# Patient Record
Sex: FEMALE | Race: WHITE
[De-identification: names, ages, dates, MRNs, and addresses within clinical notes are randomized per-mention and may not be internally consistent; named-entity substitution may affect disease eponyms.]

---

## 2016-05-03 VITALS — DIASTOLIC BLOOD PRESSURE: 86 MMHG | SYSTOLIC BLOOD PRESSURE: 136 MMHG

## 2017-03-28 ENCOUNTER — HOSPITAL ENCOUNTER (INPATIENT)
Dept: HOSPITAL 63 - ER | Age: 22
LOS: 5 days | Discharge: HOME | DRG: 872 | End: 2017-04-02
Attending: INTERNAL MEDICINE | Admitting: INTERNAL MEDICINE
Payer: OTHER GOVERNMENT

## 2017-03-28 VITALS — HEIGHT: 67 IN | WEIGHT: 110.38 LBS | BODY MASS INDEX: 17.33 KG/M2

## 2017-03-28 DIAGNOSIS — N39.0: ICD-10-CM

## 2017-03-28 DIAGNOSIS — D51.9: ICD-10-CM

## 2017-03-28 DIAGNOSIS — A41.9: Primary | ICD-10-CM

## 2017-03-28 DIAGNOSIS — E27.1: ICD-10-CM

## 2017-03-28 DIAGNOSIS — Z88.1: ICD-10-CM

## 2017-03-28 DIAGNOSIS — K21.9: ICD-10-CM

## 2017-03-28 DIAGNOSIS — N31.9: ICD-10-CM

## 2017-03-28 DIAGNOSIS — B96.89: ICD-10-CM

## 2017-03-28 DIAGNOSIS — J45.909: ICD-10-CM

## 2017-03-28 DIAGNOSIS — D84.9: ICD-10-CM

## 2017-03-28 DIAGNOSIS — B96.20: ICD-10-CM

## 2017-03-28 DIAGNOSIS — M54.2: ICD-10-CM

## 2017-03-28 DIAGNOSIS — E06.3: ICD-10-CM

## 2017-03-28 DIAGNOSIS — Z87.820: ICD-10-CM

## 2017-03-28 LAB
ALBUMIN SERPL-MCNC: 3.5 G/DL (ref 3.4–5)
ALBUMIN/GLOB SERPL: 0.8 {RATIO} (ref 1–1.7)
ALP SERPL-CCNC: 60 U/L (ref 46–116)
ALT SERPL-CCNC: 22 U/L (ref 14–59)
AMORPH SED URNS QL MICRO: PRESENT /HPF
AMYLASE SERPL-CCNC: 55 U/L (ref 25–115)
ANION GAP SERPL CALC-SCNC: 11 MMOL/L (ref 6–14)
APTT PPP: YELLOW S
AST SERPL-CCNC: 13 U/L (ref 15–37)
BACTERIA #/AREA URNS HPF: (no result) /HPF
BASOPHILS # BLD AUTO: 0 X10^3/UL (ref 0–0.2)
BASOPHILS NFR BLD: 0 % (ref 0–3)
BILIRUB SERPL-MCNC: 0.4 MG/DL (ref 0.2–1)
BILIRUB UR QL STRIP: (no result)
BUN/CREAT SERPL: 15 (ref 6–20)
CA-I SERPL ISE-MCNC: 9 MG/DL (ref 7–20)
CALCIUM SERPL-MCNC: 8.5 MG/DL (ref 8.5–10.1)
CHLORIDE SERPL-SCNC: 104 MMOL/L (ref 98–107)
CO2 SERPL-SCNC: 23 MMOL/L (ref 21–32)
CREAT SERPL-MCNC: 0.6 MG/DL (ref 0.6–1)
CRP SERPL-MCNC: 33.8 MG/L (ref 0–3.3)
EOSINOPHIL NFR BLD: 0 % (ref 0–3)
EOSINOPHIL NFR BLD: 0 X10^3/UL (ref 0–0.7)
ERYTHROCYTE [DISTWIDTH] IN BLOOD BY AUTOMATED COUNT: 12.6 % (ref 11.5–14.5)
ERYTHROCYTE [SEDIMENTATION RATE] IN BLOOD: 4 MM/H (ref 0–25)
FIBRINOGEN PPP-MCNC: (no result) MG/DL
GFR SERPLBLD BASED ON 1.73 SQ M-ARVRAT: 126.2 ML/MIN
GLOBULIN SER-MCNC: 4.6 G/DL (ref 2.2–3.8)
GLUCOSE SERPL-MCNC: 87 MG/DL (ref 70–99)
GLUCOSE UR STRIP-MCNC: (no result) MG/DL
HCT VFR BLD CALC: 37.9 % (ref 36–47)
HGB BLD-MCNC: 12.6 G/DL (ref 12–15.5)
INFLUENZA A PATIENT: NEGATIVE
INFLUENZA B PATIENT: NEGATIVE
LIPASE: 102 U/L (ref 73–393)
LYMPHOCYTES # BLD: 0.5 X10^3/UL (ref 1–4.8)
LYMPHOCYTES NFR BLD AUTO: 9 % (ref 24–48)
MCH RBC QN AUTO: 30 PG (ref 25–35)
MCHC RBC AUTO-ENTMCNC: 33 G/DL (ref 31–37)
MCV RBC AUTO: 91 FL (ref 79–100)
MONO #: 0.3 X10^3/UL (ref 0–1.1)
MONOCYTES NFR BLD: 6 % (ref 0–9)
NEUT #: 4.9 X10^3UL (ref 1.8–7.7)
NEUTROPHILS NFR BLD AUTO: 86 % (ref 31–73)
NITRITE UR QL STRIP: (no result)
PLATELET # BLD AUTO: 153 X10^3/UL (ref 140–400)
POTASSIUM SERPL-SCNC: 3.6 MMOL/L (ref 3.5–5.1)
PROT SERPL-MCNC: 8.1 G/DL (ref 6.4–8.2)
RBC # BLD AUTO: 4.15 X10^6/UL (ref 3.5–5.4)
RBC #/AREA URNS HPF: (no result) /HPF (ref 0–2)
SODIUM SERPL-SCNC: 138 MMOL/L (ref 136–145)
SP GR UR STRIP: 1.02
SQUAMOUS #/AREA URNS LPF: (no result) /LPF
UROBILINOGEN UR-MCNC: 1 MG/DL
WBC # BLD AUTO: 5.7 X10^3/UL (ref 4–11)
WBC #/AREA URNS HPF: (no result) /HPF (ref 0–4)

## 2017-03-28 PROCEDURE — 87186 SC STD MICRODIL/AGAR DIL: CPT

## 2017-03-28 PROCEDURE — 82150 ASSAY OF AMYLASE: CPT

## 2017-03-28 PROCEDURE — 86140 C-REACTIVE PROTEIN: CPT

## 2017-03-28 PROCEDURE — 87086 URINE CULTURE/COLONY COUNT: CPT

## 2017-03-28 PROCEDURE — 81001 URINALYSIS AUTO W/SCOPE: CPT

## 2017-03-28 PROCEDURE — 87205 SMEAR GRAM STAIN: CPT

## 2017-03-28 PROCEDURE — 96374 THER/PROPH/DIAG INJ IV PUSH: CPT

## 2017-03-28 PROCEDURE — 87040 BLOOD CULTURE FOR BACTERIA: CPT

## 2017-03-28 PROCEDURE — 82010 KETONE BODYS QUAN: CPT

## 2017-03-28 PROCEDURE — 96375 TX/PRO/DX INJ NEW DRUG ADDON: CPT

## 2017-03-28 PROCEDURE — 85651 RBC SED RATE NONAUTOMATED: CPT

## 2017-03-28 PROCEDURE — 36415 COLL VENOUS BLD VENIPUNCTURE: CPT

## 2017-03-28 PROCEDURE — 87804 INFLUENZA ASSAY W/OPTIC: CPT

## 2017-03-28 PROCEDURE — 87880 STREP A ASSAY W/OPTIC: CPT

## 2017-03-28 PROCEDURE — 87070 CULTURE OTHR SPECIMN AEROBIC: CPT

## 2017-03-28 PROCEDURE — 80048 BASIC METABOLIC PNL TOTAL CA: CPT

## 2017-03-28 PROCEDURE — 85027 COMPLETE CBC AUTOMATED: CPT

## 2017-03-28 PROCEDURE — 80053 COMPREHEN METABOLIC PANEL: CPT

## 2017-03-28 PROCEDURE — 96361 HYDRATE IV INFUSION ADD-ON: CPT

## 2017-03-28 PROCEDURE — 71010: CPT

## 2017-03-28 PROCEDURE — 84443 ASSAY THYROID STIM HORMONE: CPT

## 2017-03-28 PROCEDURE — 83690 ASSAY OF LIPASE: CPT

## 2017-03-28 RX ADMIN — PROMETHAZINE HYDROCHLORIDE PRN MLS/HR: 25 INJECTION INTRAMUSCULAR; INTRAVENOUS at 22:00

## 2017-03-28 NOTE — PHYS DOC
General


Chief Complaint:  FEVER


Stated Complaint:  FEVER


Time Seen by MD:  18:40


Source:  patient, family


Problems:  





History of Present Illness


Initial Comments


Patient with mother for fever. Patient is no immunodeficiency actually receives 

outpatient immunoglobulin infusions at this facility, most recently yesterday. 

Last night she developed a fever as high as 101. Mother's been giving Advil and 

Tylenol for this at home which does bring the fever down, but it comes right 

back up. Patient's also mixed headache, neck pain, back pain, and nausea. Her 

headache is generalized in nature. She has no vision changes but does have some 

photophobia. There is no earache or runny nose. There is no sore throat. She 

complains of pain in the hand as well as the back of the neck and the back. 

There is no real chest pain or shortness of breath. She does have some nausea 

but no vomiting today. She has had decreased by mouth intake. She has some 

epigastric discomfort but no other abdominal pain. No change in bowel or 

bladder habits noted. There is no acute focal extremity or neurologic 

complaints. Patient as an extensive medical history and is quite complicated. 

Most of her care is performed by her mother, who also provides most the details 

of history today. Other than Advil or Tylenol, the patient did go the primary 

care physician today and was subsequently referred to the ER for further 

evaluation. There is no other increase or decreasing factors noted. Patient's 

past medical history is unremarkable for primary mean deficiency disorder. She 

receives frequent immunoglobulin infusions, last yesterday. She also has 

Hashimoto's thyroiditis, reflux, adrenal insufficiency, asthma, arrhythmias, 

and has a feeding tube in place. This user supplement feedings only of the 

patient is able tolerate by mouth food and fluids. She also is intermittently 

cathetered she has neurogenic bladder and she is on a bowel program according 

mother. Patient parents had some surgery medical brain injury in intensity, and 

has a diffuse neuropathy of undetermined cause as well. 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 The patient 

is wheelchair-bound and the vast majority of care is accomplished by the 

mother. She is a nonsmoker and nonuser of ethanol.


Allergies:  


Coded Allergies:  


     erythromycin base (Verified  Allergy, Intermediate, 5/4/16)


     I S O L A T I O N *CONTACT* (Verified  Allergy, Unknown, 5/5/16)


 +nasal MRSA 5-4-16





Past Medical History


Medical History:  other


Psychosocial History:  other





Family History


Significant Family History:  no pertinent family hx





Social History


Smoker:  non-smoker


Alcohol:  none





Review of Systems


All Other Systems:  Reviewed and Negative





Physical Exam


General Appearance:  WD/WN, no apparent distress


Eyes:  bilateral eye normal inspection


Ear, Nose, Throat:  normal ENT inspection, normal pharynx


Neck:  full range of motion, supple, normal inspection


Respiratory:  lungs clear, normal breath sounds, no respiratory distress


Cardiovascular:  regular rate, rhythm, no edema


Gastrointestinal:  non tender, soft, no organomegaly, other


Back:  no CVA tenderness, no vertebral tenderness


Extremities:  non-tender, normal inspection


Neurologic/Psychiatric:  alert, normal mood/affect, oriented x 3, other


Skin:  normal color


Lymphatic:  no adenopathy


Comments


Generally this is a pale, chronically ill-appearing white female in no acute 

somatic distress. Vitals are as noted. Pertinent findings on physical exam 

shows ears and throat to be clear. Pupils are equal and reactive light and 

accommodation. Extraocular movements are intact. The scalp is diffusely tender 

without signs of trauma. Neck is supple without gross adenopathy or JVD. There'

s no swollen thyroid palpated. The patient moves the head through the full 

range of motion with some complaints of pain in the back of the neck. However, 

Kernig's and Brudzinski's are negative. Chest is clear cardiac exam shows 

regular rate and rhythm without murmur. The abdomen is soft and nontender 

without mass or megaly. There is no perineal findings. A feeding tube in place 

in left upper quadrant. The site looks good. Back shows no CVA tenderness. 

Externally show no rashes, cyanosis, or edema. Neurologic exam finds patient be 

awake alert oriented but with a flat affect and appears depressed. Cranial 

nerves II through XII appear grossly intact. Patient holds left arm flaccid 

with 1 over 5 strength in her known brachial plexus injury. She has good 5 over 

5 strength in the right upper extremity. She has bilateral lower extremity 

weakness 2 over 5. There are no gross sensory deficits. Remainder of physical 

exam is clinically unremarkable.





Orders, Labs, Meds


Old charts note multiple outpatient visits for Privigen infusion. Her most 

recent infusion was actually done yesterday is now patient. She's been admitted 

the ER for syncope, headache, and adverse reaction to immunoglobulins. She was 

last admitted hospital in May of last year systemic inflammatory response 

syndrome with history of chronic immune deficiency and year retention as well 

as a history of DVT.. There is no specific infection identified. She was 

treated with broad-spectrum antibiotics and infectious disease was consult. It 

was noted that if she was improved, she can probably go home without further 

antibiotics.





Labs today show white count of 5.7 with 86 polys and 6 lymphs. Hemoglobin 12.6. 

Chem profile and acetone are negative. Urinalysis shows only equivocal evidence 

for UTI 5-10 white cells few bacteria positive nitrate and trace leukocyte 

esterase.  Blood cultures have been taken and sent to the lab.





Chest x-ray shows a port in place. There are no acute changes seen by the 

emergency physician.





2300  Patient resting comfortably in the ER. Did apologize to patient and 

family on multiple occasions for place and care. Some are related to patient 

volume, as well as to obtain catheterized urine sample. Patient and her mother 

are both very gracious in understanding indicate this and the busiest they've 

ever seen the ED. I reevaluated the patient twice during her ED stay. She says 

that her somatic pain is decreased, but that just seems to make her headache 

more prominent. She is continuing to complain of some neck pain. I examination, 

however, she continues to have no real meningeal signs. She seems to indicate 

when she moves her head or tenderness and pain is more over the right SCM muscle

, and there is some mild tenderness at that area. There is really no nuchal 

rigidity or specific signs of meningitis, and she overall appears to be 

somewhat more perky and interactive now than earlier. She did feel warm and I 

retook her temperature. Was 102.7 by physician evaluation. I discussed with the 

mother the and the patient the uncertain cause of her fever and generalized 

illness. It looks like in the past due to her immunodeficiency, she's been 

placed on broad-spectrum antibodies, admitted, and absurd with consult by 

infectious disease. I think given this scenario with a persistent fever and no 

source, that is a reasonable course of action at this time. Flu swabs have been 

obtained but results are not back at this time. I don't think the patient has 

meningeal signs this time, certainly should she have increasing headache or 

nuchal rigidity LP can be done. Mother is comfortable deferring lumbar puncture 

at this time as is the patient. Patient's primary care physician is not 

admitting tonight. I discussed the case with Dr. Be of the hospitalist 

service. The mother. He knows Dr. Timmons. He does not recall the patient. 

However, he graciously agrees to accept the patient for observation admission 

with broad-spectrum antibiotic coverage and infectious disease consultation. We'

ll treat the patient symptomatically for fever with Tylenol as well as for any 

headache or nausea she might have. I have started some meropenem per the 

Sol guide for broad-spectrum coverage the patient was immunosuppression. I 

ordered urine culture in addition to blood cultures. She is resting comfortably 

at this time, in the company of her mother and an absolutely delightful service 

dog, awaiting transfer to the floor and hospitalist care.








RODNEY COLLAZO MD Mar 28, 2017 19:14

## 2017-03-28 NOTE — PHYS DOC
General


Chief Complaint:  FEVER


Stated Complaint:  FEVER


Time Seen by MD:  18:40


Problems:  





History of Present Illness


Allergies:  


Coded Allergies:  


     erythromycin base (Verified  Allergy, Intermediate, 5/4/16)


     I S O L A T I O N *CONTACT* (Verified  Allergy, Unknown, 5/5/16)


 +nasal MRSA 5-4-16





Past Medical History


Medical History:  other


Surgical History:  noncontributory





Family History


Significant Family History:  no pertinent family hx





Orders, Labs, Meds


Old charts note multiple outpatient visits for Privigen infusion. Her most 

recent infusion was actually done yesterday is now patient. She's been admitted 

the ER for syncope, headache, and adverse reaction to immunoglobulins. She was 

last admitted hospital in May of last year systemic inflammatory response 

syndrome with history of chronic immune deficiency and year retention as well 

as a history of DVT.. There is no specific infection identified. She was 

treated with broad-spectrum antibiotics and infectious disease was consult. It 

was noted that if she was improved, she can probably go home without further 

antibiotics.








RODNEY COLLAZO MD Mar 28, 2017 20:01

## 2017-03-29 VITALS — SYSTOLIC BLOOD PRESSURE: 92 MMHG | DIASTOLIC BLOOD PRESSURE: 58 MMHG

## 2017-03-29 VITALS — DIASTOLIC BLOOD PRESSURE: 68 MMHG | SYSTOLIC BLOOD PRESSURE: 108 MMHG

## 2017-03-29 VITALS — DIASTOLIC BLOOD PRESSURE: 33 MMHG | SYSTOLIC BLOOD PRESSURE: 73 MMHG

## 2017-03-29 VITALS — DIASTOLIC BLOOD PRESSURE: 49 MMHG | SYSTOLIC BLOOD PRESSURE: 82 MMHG

## 2017-03-29 VITALS — SYSTOLIC BLOOD PRESSURE: 90 MMHG | DIASTOLIC BLOOD PRESSURE: 49 MMHG

## 2017-03-29 VITALS — DIASTOLIC BLOOD PRESSURE: 65 MMHG | SYSTOLIC BLOOD PRESSURE: 104 MMHG

## 2017-03-29 VITALS — DIASTOLIC BLOOD PRESSURE: 57 MMHG | SYSTOLIC BLOOD PRESSURE: 94 MMHG

## 2017-03-29 VITALS — DIASTOLIC BLOOD PRESSURE: 39 MMHG | SYSTOLIC BLOOD PRESSURE: 76 MMHG

## 2017-03-29 VITALS — DIASTOLIC BLOOD PRESSURE: 58 MMHG | SYSTOLIC BLOOD PRESSURE: 95 MMHG

## 2017-03-29 LAB
ALBUMIN SERPL-MCNC: 2.8 G/DL (ref 3.4–5)
ALBUMIN/GLOB SERPL: 0.7 {RATIO} (ref 1–1.7)
ALP SERPL-CCNC: 50 U/L (ref 46–116)
ALT SERPL-CCNC: 16 U/L (ref 14–59)
ANION GAP SERPL CALC-SCNC: 8 MMOL/L (ref 6–14)
AST SERPL-CCNC: 11 U/L (ref 15–37)
BASOPHILS # BLD AUTO: 0 X10^3/UL (ref 0–0.2)
BASOPHILS NFR BLD: 1 % (ref 0–3)
BILIRUB SERPL-MCNC: 0.3 MG/DL (ref 0.2–1)
BUN/CREAT SERPL: 12 (ref 6–20)
CA-I SERPL ISE-MCNC: 7 MG/DL (ref 7–20)
CALCIUM SERPL-MCNC: 7.6 MG/DL (ref 8.5–10.1)
CHLORIDE SERPL-SCNC: 107 MMOL/L (ref 98–107)
CO2 SERPL-SCNC: 25 MMOL/L (ref 21–32)
CREAT SERPL-MCNC: 0.6 MG/DL (ref 0.6–1)
EOSINOPHIL NFR BLD: 0 % (ref 0–3)
EOSINOPHIL NFR BLD: 0 X10^3/UL (ref 0–0.7)
ERYTHROCYTE [DISTWIDTH] IN BLOOD BY AUTOMATED COUNT: 12.9 % (ref 11.5–14.5)
GFR SERPLBLD BASED ON 1.73 SQ M-ARVRAT: 126.2 ML/MIN
GLOBULIN SER-MCNC: 3.8 G/DL (ref 2.2–3.8)
GLUCOSE SERPL-MCNC: 103 MG/DL (ref 70–99)
HCT VFR BLD CALC: 33 % (ref 36–47)
HGB BLD-MCNC: 11.1 G/DL (ref 12–15.5)
LYMPHOCYTES # BLD: 0.6 X10^3/UL (ref 1–4.8)
LYMPHOCYTES NFR BLD AUTO: 20 % (ref 24–48)
MCH RBC QN AUTO: 31 PG (ref 25–35)
MCHC RBC AUTO-ENTMCNC: 34 G/DL (ref 31–37)
MCV RBC AUTO: 92 FL (ref 79–100)
MONO #: 0.3 X10^3/UL (ref 0–1.1)
MONOCYTES NFR BLD: 8 % (ref 0–9)
NEUT #: 2.2 X10^3UL (ref 1.8–7.7)
NEUTROPHILS NFR BLD AUTO: 70 % (ref 31–73)
PLATELET # BLD AUTO: 126 X10^3/UL (ref 140–400)
POTASSIUM SERPL-SCNC: 3.1 MMOL/L (ref 3.5–5.1)
PROT SERPL-MCNC: 6.6 G/DL (ref 6.4–8.2)
RBC # BLD AUTO: 3.6 X10^6/UL (ref 3.5–5.4)
SODIUM SERPL-SCNC: 140 MMOL/L (ref 136–145)
WBC # BLD AUTO: 3.1 X10^3/UL (ref 4–11)

## 2017-03-29 RX ADMIN — METOPROLOL TARTRATE SCH MG: 25 TABLET, FILM COATED ORAL at 21:00

## 2017-03-29 RX ADMIN — PROMETHAZINE HYDROCHLORIDE PRN MLS/HR: 25 INJECTION INTRAMUSCULAR; INTRAVENOUS at 13:37

## 2017-03-29 RX ADMIN — ONDANSETRON PRN MG: 2 INJECTION, SOLUTION INTRAMUSCULAR; INTRAVENOUS at 01:32

## 2017-03-29 RX ADMIN — PROMETHAZINE HYDROCHLORIDE PRN MLS/HR: 25 INJECTION INTRAMUSCULAR; INTRAVENOUS at 23:58

## 2017-03-29 RX ADMIN — FENTANYL CITRATE PRN MCG: 50 INJECTION, SOLUTION INTRAMUSCULAR; INTRAVENOUS at 23:57

## 2017-03-29 RX ADMIN — DEXTROSE, SODIUM CHLORIDE, AND POTASSIUM CHLORIDE SCH MLS/HR: 5; .45; .3 INJECTION INTRAVENOUS at 15:05

## 2017-03-29 RX ADMIN — HYDROCORTISONE SODIUM SUCCINATE SCH MG: 100 INJECTION, POWDER, FOR SOLUTION INTRAMUSCULAR; INTRAVENOUS at 21:37

## 2017-03-29 RX ADMIN — ONDANSETRON PRN MG: 2 INJECTION, SOLUTION INTRAMUSCULAR; INTRAVENOUS at 09:47

## 2017-03-29 RX ADMIN — DEXTROSE AND SODIUM CHLORIDE SCH MLS/HR: 5; .9 INJECTION, SOLUTION INTRAVENOUS at 01:28

## 2017-03-29 RX ADMIN — MEROPENEM SCH MLS/HR: 1 INJECTION, POWDER, FOR SOLUTION INTRAVENOUS at 05:35

## 2017-03-29 RX ADMIN — HYDROCORTISONE SODIUM SUCCINATE SCH MG: 100 INJECTION, POWDER, FOR SOLUTION INTRAMUSCULAR; INTRAVENOUS at 15:05

## 2017-03-29 RX ADMIN — DEXTROSE, SODIUM CHLORIDE, AND POTASSIUM CHLORIDE SCH MLS/HR: 5; .45; .3 INJECTION INTRAVENOUS at 21:36

## 2017-03-29 RX ADMIN — FLUDROCORTISONE ACETATE SCH MG: 0.1 TABLET ORAL at 11:42

## 2017-03-29 RX ADMIN — METOPROLOL TARTRATE SCH MG: 25 TABLET, FILM COATED ORAL at 09:37

## 2017-03-29 RX ADMIN — MEROPENEM SCH MLS/HR: 1 INJECTION, POWDER, FOR SOLUTION INTRAVENOUS at 13:38

## 2017-03-29 RX ADMIN — FENTANYL CITRATE PRN MCG: 50 INJECTION, SOLUTION INTRAMUSCULAR; INTRAVENOUS at 06:03

## 2017-03-29 RX ADMIN — PANTOPRAZOLE SODIUM SCH MG: 40 TABLET, DELAYED RELEASE ORAL at 11:42

## 2017-03-29 RX ADMIN — FENTANYL CITRATE PRN MCG: 50 INJECTION, SOLUTION INTRAMUSCULAR; INTRAVENOUS at 01:31

## 2017-03-29 RX ADMIN — MEROPENEM SCH MLS/HR: 1 INJECTION, POWDER, FOR SOLUTION INTRAVENOUS at 21:37

## 2017-03-29 RX ADMIN — FENTANYL CITRATE PRN MCG: 50 INJECTION, SOLUTION INTRAMUSCULAR; INTRAVENOUS at 13:37

## 2017-03-29 RX ADMIN — FENTANYL CITRATE PRN MCG: 50 INJECTION, SOLUTION INTRAMUSCULAR; INTRAVENOUS at 09:45

## 2017-03-29 RX ADMIN — FENTANYL CITRATE PRN MCG: 50 INJECTION, SOLUTION INTRAMUSCULAR; INTRAVENOUS at 19:50

## 2017-03-29 RX ADMIN — DEXTROSE AND SODIUM CHLORIDE SCH MLS/HR: 5; .9 INJECTION, SOLUTION INTRAVENOUS at 09:36

## 2017-03-29 NOTE — PN
DATE:  03/29/2017



SUBJECTIVE:  The patient is resting slightly propped up in bed, in no apparent

respiratory distress.  She continued to complain of headache and neck pain. 

However, her temperature is down.  Temperature went up to 103 Fahrenheit this

morning.  However, by the time I saw her this afternoon, her temperature is down

at 98.4 and again at 99.3.



PHYSICAL EXAMINATION:

GENERAL:  When I examined her, she looked pale, but no jaundice, cyanosis, or

thyromegaly.  No jugular venous distension.  No limb edema.

VITAL SIGNS:  Her heart rate was 87, blood pressure was 92/58, temperature was

99.3, respiratory rate was 20, and oxygen saturation was 98%.

HEAD, EYES, EARS, NOSE, AND THROAT:  Normocephalic, atraumatic.

NECK:  Supple.

HEART:  Showed normal first and second heart sounds.  No gallop, rub, or murmur.

CHEST:  Clear to auscultation.  No crepitation or rhonchi.

ABDOMEN:  Distended, soft, nontender.  No guarding or rigidity.  No

organomegaly.  Hernial orifices intact.  Bowel sounds normal.

NEUROLOGIC:  She was awake, alert, responding appropriately.  Cranial nerves

intact.  She moves the right upper extremity.  She has flaccid paralysis to ____

paraplegia.  She is mostly bed bound and chair bound.  Her intake over the last

24 hours was 1200.  No output was recorded.



LABORATORY DATA:  Her lab work this morning showed white cell count is 3100,

hemoglobin 11, hematocrit 33, MCV 92, and platelet count 126,000.  Her chemistry

showed serum sodium 140, potassium 3.1, chloride 107, bicarbonate 25, anion gap

of 8, BUN 7, creatinine 0.6, estimated GFR was 126 mL per minute.  Her glucose

was 103, calcium was 7.6.  Total bilirubin, AST, ALT, alkaline phosphatase were

normal.  Total protein was 6.6, albumin was 2.8.



ASSESSMENT:  This is a 21-year-old  female the patient with

immunodeficiency syndrome.  She came with febrile illness with ____ of

infections not very clear.  She has a Port-A-Cath; however, her white cell count

is actually ____ trending down.  I did speak with Dr. Bowman and he recommended

to continue with the same management.  There is no need now for vancomycin. 

There is no need for lumbar puncture.  I did increase hydrocortisone to ____

dose of 100 mg 3 times a day intravenously and continue with all other

treatment.  Her potassium is low, so I switched her to D5 half normal with 40

mEq of potassium chloride and we will repeat all her lab works tomorrow and

decide on further management accordingly.





______________________________

MADISON MACKAY MD



DR:  IVANA/clare  JOB#:  [id number] / [id number]

DD:  03/29/2017 13:45  DT:  03/29/2017 23:07

## 2017-03-29 NOTE — HP
ADMIT DATE:  03/29/2017



HISTORY OF PRESENT ILLNESS:  The patient is a 21-year-old  female

patient, who came to the Emergency Room with fever.  She apparently is known to

have immune deficiency syndrome diagnosed about 6 years ago and she received her

hemoglobin as an outpatient infusion with most recently done 2 days ago on

Monday night, she had temperature up to 101 Fahrenheit.  Her mother has been

giving her Advil and Tylenol at home, but it does bring that fever down, but

comes back again.  She also complained of headache, neck pain, back pain,

nausea.  Her headache is generalized in nature.  She has no vision changes, but

does have some photophobia.  There is no earache or runny nose.  There is no

sore throat.  She complained of pain in her hand as well as neck and back. 

There is no chest pain or shortness of breath.  She did complain of some nausea,

but no vomiting.  She has had decreased intake by mouth.  She had some

epigastric discomfort with no other abdominal pain, no change in bowel habits or

bladder habit.  She has no acute neurological deficit.  She was seen in the

Emergency Room, was evaluated.  Blood cultures were drawn and was started on IV

meropenem and was admitted for further evaluation and treatment.



PAST MEDICAL HISTORY:  Significant immune deficiency diagnosed about 6 years

ago.  She is followed by Dr. Bowman.  She has traumatic brain injury, left upper

and bilateral lower extremity neuropathy with motor dysfunction.  She is

normally wheelchair bound.  She has neurogenic bladder, Hashimoto's thyroiditis,

 adrenal insufficiency, iron deficiency for which she received iron infusions

intravenously.  She has also B12 deficiency anemia, has had the history of

septic cholangitis, multiple bacterial infection, gastroesophageal reflux

disease, DVT and bronchial asthma.



PAST SURGICAL HISTORY:  Significant for fundoplication x 2, percutaneous

endoscopic gastrostomy tube placement, spinal stimulator, nerve stimulator,

Port-A-Cath placement twice, tonsillectomy and adenoidectomy.



FAMILY HISTORY:  Unremarkable.  She has 2 brothers, who are healthy and her

parents are healthy.



SOCIAL HISTORY:  She does not smoke, drink alcohol or use any recreational

drugs.  She lives with her parents.



ALLERGIES:  She is allergic to ERYTHROMYCIN BASE.



MEDICATIONS:  She is currently on the following medications:  She is on Nexium 1

capsule once a day, fludrocortisone 100 mcg once a day, hydrocortisone 5 mg

twice a day, linaclotide Linzess 90 mcg once a day, metoprolol tartrate 25 mg

p.o. b.i.d. and oxycodone/APAP 5/325 one tablet every 6 hours for pain.



REVIEW OF SYSTEMS:  The patient denied any blurring of vision, cataract,

glaucoma or macular degeneration.  Did complain of photophobia.  Denied any

headache, tinnitus or sensorineural deafness.  Denied any nosebleeds, stuffy

nose or postnasal drip.  Denied any sore throat, sore tongue, toothache,

hoarseness of voice or difficulty swallowing.  Did complain of nausea, but no

vomiting.  Did complain of epigastric pain, but no diarrhea or constipation. 

Denied any hematemesis, melena or hematochezia.  Denied any dysuria, frequency

or hematuria.  Denied any chest pain, shortness of breath, orthopnea or

paroxysmal nocturnal dyspnea.  Denied any cough, phlegm or hemoptysis.  Biggest

complaint is severe headache, neck pain and back pain.



PHYSICAL EXAMINATION:

GENERAL:  On arrival to the Emergency Room; she was pale, cachectic, but not

jaundiced, cyanosis or thyromegaly.  No jugular venous distention.  No limb

edema.

VITAL SIGNS:  Her heart rate was 97, blood pressure was 108/68, temperature was

100, respiratory rate was 16, and oxygen saturation was 98%.

HEAD, EYES, EARS, NOSE AND THROAT:  Showed normocephalic, atraumatic.

NECK:  Supple.

HEART:  Showed normal first and second heart sounds with no gallop, rub or

murmur.

CHEST:  Clear to auscultation.  No crepitation or rhonchi.

ABDOMEN:  Distended, soft, nontender.  No guarding or rigidity.  No

organomegaly.  She had gastrostomy tube in place.

NEUROLOGIC:  She is awake, alert, responds appropriately.  Cranial nerves

intact.

EXTREMITIES:  She moves right upper extremity.  She has flaccid paralysis of

left upper extremity and also paraplegia.  She has no neck rigidity and Kernig's

sign cannot be elicited.



LABORATORY DATA:  In the Emergency Room showed serum sodium 138, potassium 3.6,

chloride 104, bicarbonate 23, anion gap of 11, BUN 9, creatinine 0.6, estimated

GFR was 126 mL per minute.  Her glucose was 87, calcium was 8.5.  Total

bilirubin, AST, ALT, alkaline phosphatase were normal.  Her total protein was

8.1, albumin was 3.5.  Amylase and lipase were normal.  Her C-reactive protein

was at 3.8.  White cell count was 5700, hemoglobin 12.6, hematocrit 38, MCV 91,

and platelet count of 153,000 with a manual differential showed 86% polymorphs,

9% lymphocytes, 6% monocytes.  She had had a chest x-ray done, which was

unremarkable and showed no that there are no acute cardiopulmonary abnormalities

detected.



ASSESSMENT AND PLAN:  The patient was admitted and started on IV meropenem, IV

_____ and pain medication as well as Phenergan for nausea and vomiting.  There

urinalysis was essentially unremarkable.  We will obviously await the result of

the culture and sensitivity and decide on further management accordingly.





______________________________

MADISON MACKAY MD



DR:  IVANA/clare  JOB#:  [id number] / [id number]

DD:  03/29/2017 13:41  DT:  03/29/2017 14:13

## 2017-03-29 NOTE — ACF
Admission Criteria Forms


                                               FEVER 





Clinical Indications for Inpatient Care





                                                              (Place 'X' for 

any and all applicable criteria): 





Ongoing inpatient care may be indicated for fever with ANY ONE of the following[

D] (5)(27)(28)(29)(30)(31):


[ ]I.      Bacteremia


[ ]II.     Evidence of significant systemic illness as indicated by ANY ONE of 

the following:


           [ ]a)   Persistently high temperatures greater than 103.1 degrees F (

39.5 degrees C) (oral)                   


           [ ]b)   New-onset hypoxia


           [ ]c)   Hemodynamic instability               


           [ ]d)   Mental status changes


           [ ]e)   Decreased urine output due to developing renal insufficiency

    


           [ ]f)    New focal neurologic deficit (eg, stroke)       


           [ ]g)   Seizures


           [ ]h)   Rigors     


           [ ]i)    Dehydration or hypovolemia     


           [ ]j)    Inadequate oral intake 


[X]III.    Patient in the immediate postoperative period with ANY ONE of the 

following (E)(23)(24):


          [ ]a)   Evidence of specific localizing infection requiring ongoing 

inpatient evaluation or treatment (eg,abscess, severe pneumonia, wound infection

)


          [ X]b)   Known or suspected cause of fever requiring ongoing 

inpatient evaluation or treatment (eg, DVT)


          [ ]c)   Evidence of malignant hyperthermia (eg, unexplained 

tachycardia and muscle rigidity 


                   after depolarizing muscular blocking agent or inhaled 

anesthetic agent)                  


[ ]IV.   Suspected cause requiring acute care (eg, endocarditis, meningitis)


[ ]V.    High suspicion of bacteremia as indicated by severe constitutional 

symptoms in patient at high risk as indicated by ANY ONE of the following:


          [ ]a)    Immunocompromised state [D](22)        


          [ ]b)    Age <3 years or >65 years     


          [ ]c)    Severe comorbidities (eg, poorly controlled diabetes, severe 

COPD) 


[ ]VI.   High suspicion for fungal infection as indicated by ANY ONE of the 

following (22)(25):


          [ ]a)   Febrile neutropenia (WBC <500/mm3 (0.5 X 109/L)) for >4 days 

despite broad spectrum antibiotics 


          [ ]b)   Imaging findings suggestive of fungal infection        


          [ ]c)   Immunocompromised state


          [ ]d)   Immunocompromised patient colonized with Aspergillus species


[ ]VII.  Evidence of infection of medical devices such as implanted catheters 

or exposed hardware


[ ]VIII. Suspected neuroleptic malignant syndrome as evidenced by ALL of the 

following (15):


          [ ]a)   Recent use of neuroleptic medication (eg, haloperidol, 

prochlorperazine, metoclopramide)


          [ ]b)   New-onset muscle rigidity











Extended stay beyond goal length of stay for primary condition may be needed 

until ALL of the following are present(16)(17)(18)(19)(20)(21): 


[ ]a)    Temperature status acceptable as indicated by ANY ONE of the following:


          [ ]i)    Temp <38.1C (100.5 F) (oral)


          [ ]ii)   Temp as expected for disease process and care performable at 

next level of care


[ ]b)    Hemodynamic stability 


[ ]c)    Cultures negative or infection identified and under adequate treatment 


[ ]d)    Behavior or mental status abnormalities absent or manageable at lower 

level of care (Also use 


          Mental Status Change Criteria Form) for further information. 


[ ]e)    Medical comorbidities absent or manageable at a lower level of care


 





The original [facility] content  created  by [facility] has been revised. 


The portions of  the content which  have been revised  are identified through 

the use of italic text or in bold, and [facility] 


has neither reviewed nor approved the modified material. All other unmodified 

content  is copyright  [facility].





Please see references footnoted in the original [facility] edition 

2016








DOROTEO RADFORD Mar 29, 2017 02:33

## 2017-03-29 NOTE — RAD
Portable chest, 3/28/2017:



History: Fever, immunodeficiency disease



Comparison is made to a study from 12/19/2016. A left Port-A-Cath remains in

place with its tip lying in the inferior aspect of the superior vena cava. The

heart size and pulmonary vascularity are normal. No pulmonary infiltrates are

seen. There is no evidence of pleural fluid. There is a mild thoracolumbar

scoliosis.



IMPRESSION: No acute cardiopulmonary abnormality is detected.

## 2017-03-30 VITALS — DIASTOLIC BLOOD PRESSURE: 51 MMHG | SYSTOLIC BLOOD PRESSURE: 93 MMHG

## 2017-03-30 VITALS — SYSTOLIC BLOOD PRESSURE: 97 MMHG | DIASTOLIC BLOOD PRESSURE: 58 MMHG

## 2017-03-30 VITALS — DIASTOLIC BLOOD PRESSURE: 56 MMHG | SYSTOLIC BLOOD PRESSURE: 106 MMHG

## 2017-03-30 LAB
ALBUMIN SERPL-MCNC: 2.8 G/DL (ref 3.4–5)
ALBUMIN/GLOB SERPL: 0.7 {RATIO} (ref 1–1.7)
ALP SERPL-CCNC: 51 U/L (ref 46–116)
ALT SERPL-CCNC: 30 U/L (ref 14–59)
ANION GAP SERPL CALC-SCNC: 7 MMOL/L (ref 6–14)
AST SERPL-CCNC: 25 U/L (ref 15–37)
BASOPHILS # BLD AUTO: 0 X10^3/UL (ref 0–0.2)
BASOPHILS NFR BLD: 0 % (ref 0–3)
BILIRUB SERPL-MCNC: 0.2 MG/DL (ref 0.2–1)
BUN/CREAT SERPL: 6 (ref 6–20)
CA-I SERPL ISE-MCNC: 3 MG/DL (ref 7–20)
CALCIUM SERPL-MCNC: 8.3 MG/DL (ref 8.5–10.1)
CHLORIDE SERPL-SCNC: 109 MMOL/L (ref 98–107)
CO2 SERPL-SCNC: 24 MMOL/L (ref 21–32)
CREAT SERPL-MCNC: 0.5 MG/DL (ref 0.6–1)
EOSINOPHIL NFR BLD: 0 % (ref 0–3)
EOSINOPHIL NFR BLD: 0 X10^3/UL (ref 0–0.7)
ERYTHROCYTE [DISTWIDTH] IN BLOOD BY AUTOMATED COUNT: 13 % (ref 11.5–14.5)
GFR SERPLBLD BASED ON 1.73 SQ M-ARVRAT: 155.7 ML/MIN
GLOBULIN SER-MCNC: 4 G/DL (ref 2.2–3.8)
GLUCOSE SERPL-MCNC: 122 MG/DL (ref 70–99)
HCT VFR BLD CALC: 32.3 % (ref 36–47)
HGB BLD-MCNC: 10.9 G/DL (ref 12–15.5)
LYMPHOCYTES # BLD: 0.5 X10^3/UL (ref 1–4.8)
LYMPHOCYTES NFR BLD AUTO: 21 % (ref 24–48)
MCH RBC QN AUTO: 31 PG (ref 25–35)
MCHC RBC AUTO-ENTMCNC: 34 G/DL (ref 31–37)
MCV RBC AUTO: 91 FL (ref 79–100)
MONO #: 0.3 X10^3/UL (ref 0–1.1)
MONOCYTES NFR BLD: 10 % (ref 0–9)
NEUT #: 1.8 X10^3UL (ref 1.8–7.7)
NEUTROPHILS NFR BLD AUTO: 69 % (ref 31–73)
PLATELET # BLD AUTO: 151 X10^3/UL (ref 140–400)
POTASSIUM SERPL-SCNC: 3.8 MMOL/L (ref 3.5–5.1)
PROT SERPL-MCNC: 6.8 G/DL (ref 6.4–8.2)
RBC # BLD AUTO: 3.55 X10^6/UL (ref 3.5–5.4)
SODIUM SERPL-SCNC: 140 MMOL/L (ref 136–145)
WBC # BLD AUTO: 2.7 X10^3/UL (ref 4–11)

## 2017-03-30 RX ADMIN — FENTANYL CITRATE PRN MCG: 50 INJECTION, SOLUTION INTRAMUSCULAR; INTRAVENOUS at 11:34

## 2017-03-30 RX ADMIN — HYDROCORTISONE SODIUM SUCCINATE SCH MG: 100 INJECTION, POWDER, FOR SOLUTION INTRAMUSCULAR; INTRAVENOUS at 14:20

## 2017-03-30 RX ADMIN — DEXTROSE, SODIUM CHLORIDE, AND POTASSIUM CHLORIDE SCH MLS/HR: 5; .45; .3 INJECTION INTRAVENOUS at 20:41

## 2017-03-30 RX ADMIN — DEXTROSE, SODIUM CHLORIDE, AND POTASSIUM CHLORIDE SCH MLS/HR: 5; .45; .3 INJECTION INTRAVENOUS at 05:28

## 2017-03-30 RX ADMIN — FENTANYL CITRATE PRN MCG: 50 INJECTION, SOLUTION INTRAMUSCULAR; INTRAVENOUS at 15:56

## 2017-03-30 RX ADMIN — PANTOPRAZOLE SODIUM SCH MG: 40 TABLET, DELAYED RELEASE ORAL at 12:39

## 2017-03-30 RX ADMIN — FENTANYL CITRATE PRN MCG: 50 INJECTION, SOLUTION INTRAMUSCULAR; INTRAVENOUS at 05:31

## 2017-03-30 RX ADMIN — FLUDROCORTISONE ACETATE SCH MG: 0.1 TABLET ORAL at 12:39

## 2017-03-30 RX ADMIN — HYDROCORTISONE SODIUM SUCCINATE SCH MG: 100 INJECTION, POWDER, FOR SOLUTION INTRAMUSCULAR; INTRAVENOUS at 20:40

## 2017-03-30 RX ADMIN — ENOXAPARIN SODIUM SCH MG: 100 INJECTION SUBCUTANEOUS at 20:41

## 2017-03-30 RX ADMIN — HYDROCORTISONE SODIUM SUCCINATE SCH MG: 100 INJECTION, POWDER, FOR SOLUTION INTRAMUSCULAR; INTRAVENOUS at 05:29

## 2017-03-30 RX ADMIN — MEROPENEM SCH MLS/HR: 1 INJECTION, POWDER, FOR SOLUTION INTRAVENOUS at 20:40

## 2017-03-30 RX ADMIN — PROMETHAZINE HYDROCHLORIDE PRN MLS/HR: 25 INJECTION INTRAMUSCULAR; INTRAVENOUS at 14:44

## 2017-03-30 RX ADMIN — MEROPENEM SCH MLS/HR: 1 INJECTION, POWDER, FOR SOLUTION INTRAVENOUS at 05:28

## 2017-03-30 RX ADMIN — MEROPENEM SCH MLS/HR: 1 INJECTION, POWDER, FOR SOLUTION INTRAVENOUS at 14:17

## 2017-03-30 RX ADMIN — FENTANYL CITRATE PRN MCG: 50 INJECTION, SOLUTION INTRAMUSCULAR; INTRAVENOUS at 20:40

## 2017-03-30 RX ADMIN — DEXTROSE, SODIUM CHLORIDE, AND POTASSIUM CHLORIDE SCH MLS/HR: 5; .45; .3 INJECTION INTRAVENOUS at 14:17

## 2017-03-30 RX ADMIN — METOPROLOL TARTRATE SCH MG: 25 TABLET, FILM COATED ORAL at 20:50

## 2017-03-30 RX ADMIN — METOPROLOL TARTRATE SCH MG: 25 TABLET, FILM COATED ORAL at 09:26

## 2017-03-31 VITALS — SYSTOLIC BLOOD PRESSURE: 115 MMHG | DIASTOLIC BLOOD PRESSURE: 66 MMHG

## 2017-03-31 VITALS — SYSTOLIC BLOOD PRESSURE: 100 MMHG | DIASTOLIC BLOOD PRESSURE: 62 MMHG

## 2017-03-31 VITALS — SYSTOLIC BLOOD PRESSURE: 110 MMHG | DIASTOLIC BLOOD PRESSURE: 64 MMHG

## 2017-03-31 VITALS — SYSTOLIC BLOOD PRESSURE: 107 MMHG | DIASTOLIC BLOOD PRESSURE: 69 MMHG

## 2017-03-31 LAB
ALBUMIN SERPL-MCNC: 2.6 G/DL (ref 3.4–5)
ALBUMIN/GLOB SERPL: 0.7 {RATIO} (ref 1–1.7)
ALP SERPL-CCNC: 49 U/L (ref 46–116)
ALT SERPL-CCNC: 34 U/L (ref 14–59)
ANION GAP SERPL CALC-SCNC: 8 MMOL/L (ref 6–14)
AST SERPL-CCNC: 23 U/L (ref 15–37)
BASOPHILS # BLD AUTO: 0 X10^3/UL (ref 0–0.2)
BASOPHILS NFR BLD: 0 % (ref 0–3)
BILIRUB SERPL-MCNC: 0.1 MG/DL (ref 0.2–1)
BUN/CREAT SERPL: 3 (ref 6–20)
CA-I SERPL ISE-MCNC: 2 MG/DL (ref 7–20)
CALCIUM SERPL-MCNC: 8.1 MG/DL (ref 8.5–10.1)
CHLORIDE SERPL-SCNC: 109 MMOL/L (ref 98–107)
CO2 SERPL-SCNC: 25 MMOL/L (ref 21–32)
CREAT SERPL-MCNC: 0.6 MG/DL (ref 0.6–1)
EOSINOPHIL NFR BLD: 0 % (ref 0–3)
EOSINOPHIL NFR BLD: 0 X10^3/UL (ref 0–0.7)
ERYTHROCYTE [DISTWIDTH] IN BLOOD BY AUTOMATED COUNT: 12.7 % (ref 11.5–14.5)
GFR SERPLBLD BASED ON 1.73 SQ M-ARVRAT: 126.2 ML/MIN
GLOBULIN SER-MCNC: 3.8 G/DL (ref 2.2–3.8)
GLUCOSE SERPL-MCNC: 97 MG/DL (ref 70–99)
HCT VFR BLD CALC: 30.7 % (ref 36–47)
HGB BLD-MCNC: 10.4 G/DL (ref 12–15.5)
LYMPHOCYTES # BLD: 0.9 X10^3/UL (ref 1–4.8)
LYMPHOCYTES NFR BLD AUTO: 16 % (ref 24–48)
MCH RBC QN AUTO: 31 PG (ref 25–35)
MCHC RBC AUTO-ENTMCNC: 34 G/DL (ref 31–37)
MCV RBC AUTO: 91 FL (ref 79–100)
MONO #: 0.7 X10^3/UL (ref 0–1.1)
MONOCYTES NFR BLD: 12 % (ref 0–9)
NEUT #: 4.1 X10^3UL (ref 1.8–7.7)
NEUTROPHILS NFR BLD AUTO: 72 % (ref 31–73)
PLATELET # BLD AUTO: 144 X10^3/UL (ref 140–400)
POTASSIUM SERPL-SCNC: 3.6 MMOL/L (ref 3.5–5.1)
PROT SERPL-MCNC: 6.4 G/DL (ref 6.4–8.2)
RBC # BLD AUTO: 3.39 X10^6/UL (ref 3.5–5.4)
SODIUM SERPL-SCNC: 142 MMOL/L (ref 136–145)
WBC # BLD AUTO: 5.7 X10^3/UL (ref 4–11)

## 2017-03-31 RX ADMIN — ENOXAPARIN SODIUM SCH MG: 100 INJECTION SUBCUTANEOUS at 20:15

## 2017-03-31 RX ADMIN — MEROPENEM SCH MLS/HR: 1 INJECTION, POWDER, FOR SOLUTION INTRAVENOUS at 14:06

## 2017-03-31 RX ADMIN — PANTOPRAZOLE SODIUM SCH MG: 40 TABLET, DELAYED RELEASE ORAL at 12:15

## 2017-03-31 RX ADMIN — MEROPENEM SCH MLS/HR: 1 INJECTION, POWDER, FOR SOLUTION INTRAVENOUS at 04:59

## 2017-03-31 RX ADMIN — ONDANSETRON PRN MG: 2 INJECTION, SOLUTION INTRAMUSCULAR; INTRAVENOUS at 12:22

## 2017-03-31 RX ADMIN — FENTANYL CITRATE PRN MCG: 50 INJECTION, SOLUTION INTRAMUSCULAR; INTRAVENOUS at 05:22

## 2017-03-31 RX ADMIN — PROMETHAZINE HYDROCHLORIDE PRN MLS/HR: 25 INJECTION INTRAMUSCULAR; INTRAVENOUS at 05:44

## 2017-03-31 RX ADMIN — FENTANYL CITRATE PRN MCG: 50 INJECTION, SOLUTION INTRAMUSCULAR; INTRAVENOUS at 16:45

## 2017-03-31 RX ADMIN — PROMETHAZINE HYDROCHLORIDE PRN MLS/HR: 25 INJECTION INTRAMUSCULAR; INTRAVENOUS at 15:06

## 2017-03-31 RX ADMIN — MEROPENEM SCH MLS/HR: 1 INJECTION, POWDER, FOR SOLUTION INTRAVENOUS at 20:15

## 2017-03-31 RX ADMIN — FENTANYL CITRATE PRN MCG: 50 INJECTION, SOLUTION INTRAMUSCULAR; INTRAVENOUS at 12:15

## 2017-03-31 RX ADMIN — DEXTROSE, SODIUM CHLORIDE, AND POTASSIUM CHLORIDE SCH MLS/HR: 5; .45; .3 INJECTION INTRAVENOUS at 16:51

## 2017-03-31 RX ADMIN — METOPROLOL TARTRATE SCH MG: 25 TABLET, FILM COATED ORAL at 19:27

## 2017-03-31 RX ADMIN — DEXTROSE, SODIUM CHLORIDE, AND POTASSIUM CHLORIDE SCH MLS/HR: 5; .45; .3 INJECTION INTRAVENOUS at 13:45

## 2017-03-31 RX ADMIN — HYDROCORTISONE SODIUM SUCCINATE SCH MG: 100 INJECTION, POWDER, FOR SOLUTION INTRAMUSCULAR; INTRAVENOUS at 20:15

## 2017-03-31 RX ADMIN — METOPROLOL TARTRATE SCH MG: 25 TABLET, FILM COATED ORAL at 08:48

## 2017-03-31 RX ADMIN — HYDROCORTISONE SODIUM SUCCINATE SCH MG: 100 INJECTION, POWDER, FOR SOLUTION INTRAMUSCULAR; INTRAVENOUS at 04:59

## 2017-03-31 RX ADMIN — KETOROLAC TROMETHAMINE PRN MG: 15 INJECTION, SOLUTION INTRAMUSCULAR; INTRAVENOUS at 20:15

## 2017-03-31 RX ADMIN — FLUDROCORTISONE ACETATE SCH MG: 0.1 TABLET ORAL at 12:15

## 2017-03-31 RX ADMIN — HYDROCORTISONE SODIUM SUCCINATE SCH MG: 100 INJECTION, POWDER, FOR SOLUTION INTRAMUSCULAR; INTRAVENOUS at 14:06

## 2017-03-31 RX ADMIN — DEXTROSE, SODIUM CHLORIDE, AND POTASSIUM CHLORIDE SCH MLS/HR: 5; .45; .3 INJECTION INTRAVENOUS at 04:58

## 2017-03-31 NOTE — PN
DATE:  03/30/2017



SUBJECTIVE:  The patient is resting slightly propped up and feeling much better

today.  The headache is much improved, although she continues to have neck pain.

 Her appetite has improved.  She is eating and drinking.  She has been afebrile

and apparently her blood culture showed the growth of gram-positive cocci seen

of two bottles.  Once it was drawn ____ identification and sensitivity is still

pending at the time of this dictation.



PHYSICAL EXAMINATION:

GENERAL:  When I examined her, she looked well and was definitely more awake and

alert.  Slightly pale, but no jaundice, cyanosis, or thyromegaly.  No jugular

venous distention.  No limb edema.

VITAL SIGNS:  Her heart rate was 66, blood pressure was 93/51, temperature was

98.4, respiratory rate was 16, and oxygen saturation was 98% on room air.

HEAD, EYES, EARS, NOSE AND THROAT:  Showed normocephalic and atraumatic.

NECK:  Supple.

HEART:  Showed normal first and second heart sounds with no gallop, rub, or

murmur.

CHEST:  Clear to auscultation.  No crepitation or rhonchi.

ABDOMEN:  Distended, soft, and nontender.  No guarding or rigidity.  No

organomegaly.  Hernial orifices are intact.  Bowel sounds normal.

NEUROLOGIC:  She was awake, alert, and definitely feeling better.  All her

cranial nerves intact.  She moves upper extremities without difficulty.  She has

left upper monoplegia and bilateral paraplegia.  Her intake over the last 24

hours was 4470.



LABORATORY DATA:  Her lab work showed white cell count down to 2700, hemoglobin

11, hematocrit 32, MCV 91, and platelet count of 151,000.  Her chemistry showed

a serum sodium 140, potassium 3.8, chloride 109, bicarbonate 24, anion gap of 7,

BUN 3, creatinine 0.5, and estimated GFR was 55 mL per minute.  Her glucose was

122 mg/dL and calcium was 8.3.  Total bilirubin, AST, ALT, and alkaline

phosphatase are normal.  Total protein was 6.8 and albumin 2.8.  Amylase and

lipase normal.  TSH was 0.83.  Urinalysis was unremarkable.  Her serology was

negative for influenza A and B and group A streptococcus.



ASSESSMENT:  A 21-year-old  female patient with immune deficiency

syndrome and sepsis growing gram-positive cocci that seems to be responding to

meropenem; adrenal insufficiency for which I  put her on stress dose of

hydrocortisone at 100 mg 3 times a day.  She has also Hashimoto's thyroiditis;

however, her thyroid function test is normal.



PLAN:  My plan is to continue with current treatment.  Continue with IV fluid. 

We will await for the identification and sensitivity of the bacteria tomorrow

and we will adjust antibiotics accordingly.





______________________________

MADISON MACKAY MD



DR:  IVANA/clare  JOB#:  [id number] / [id number]

DD:  03/30/2017 13:41  DT:  03/31/2017 02:59

## 2017-04-01 VITALS — DIASTOLIC BLOOD PRESSURE: 69 MMHG | SYSTOLIC BLOOD PRESSURE: 106 MMHG

## 2017-04-01 VITALS — SYSTOLIC BLOOD PRESSURE: 103 MMHG | DIASTOLIC BLOOD PRESSURE: 71 MMHG

## 2017-04-01 VITALS — DIASTOLIC BLOOD PRESSURE: 59 MMHG | SYSTOLIC BLOOD PRESSURE: 94 MMHG

## 2017-04-01 LAB
ANION GAP SERPL CALC-SCNC: 8 MMOL/L (ref 6–14)
CA-I SERPL ISE-MCNC: 3 MG/DL (ref 7–20)
CALCIUM SERPL-MCNC: 8 MG/DL (ref 8.5–10.1)
CHLORIDE SERPL-SCNC: 109 MMOL/L (ref 98–107)
CO2 SERPL-SCNC: 25 MMOL/L (ref 21–32)
CREAT SERPL-MCNC: 0.5 MG/DL (ref 0.6–1)
ERYTHROCYTE [DISTWIDTH] IN BLOOD BY AUTOMATED COUNT: 12.7 % (ref 11.5–14.5)
GFR SERPLBLD BASED ON 1.73 SQ M-ARVRAT: 155.7 ML/MIN
GLUCOSE SERPL-MCNC: 116 MG/DL (ref 70–99)
HCT VFR BLD CALC: 27.8 % (ref 36–47)
HGB BLD-MCNC: 9.5 G/DL (ref 12–15.5)
MCH RBC QN AUTO: 31 PG (ref 25–35)
MCHC RBC AUTO-ENTMCNC: 34 G/DL (ref 31–37)
MCV RBC AUTO: 91 FL (ref 79–100)
PLATELET # BLD AUTO: 142 X10^3/UL (ref 140–400)
POTASSIUM SERPL-SCNC: 3.4 MMOL/L (ref 3.5–5.1)
RBC # BLD AUTO: 3.05 X10^6/UL (ref 3.5–5.4)
SODIUM SERPL-SCNC: 142 MMOL/L (ref 136–145)
WBC # BLD AUTO: 5.2 X10^3/UL (ref 4–11)

## 2017-04-01 RX ADMIN — PANTOPRAZOLE SODIUM SCH MG: 40 TABLET, DELAYED RELEASE ORAL at 14:49

## 2017-04-01 RX ADMIN — ENOXAPARIN SODIUM SCH MG: 100 INJECTION SUBCUTANEOUS at 21:22

## 2017-04-01 RX ADMIN — HYDROCORTISONE SODIUM SUCCINATE SCH MG: 100 INJECTION, POWDER, FOR SOLUTION INTRAMUSCULAR; INTRAVENOUS at 21:23

## 2017-04-01 RX ADMIN — FENTANYL CITRATE PRN MCG: 50 INJECTION, SOLUTION INTRAMUSCULAR; INTRAVENOUS at 00:13

## 2017-04-01 RX ADMIN — FENTANYL CITRATE PRN MCG: 50 INJECTION, SOLUTION INTRAMUSCULAR; INTRAVENOUS at 09:37

## 2017-04-01 RX ADMIN — METOPROLOL TARTRATE SCH MG: 25 TABLET, FILM COATED ORAL at 21:00

## 2017-04-01 RX ADMIN — HYDROCORTISONE SODIUM SUCCINATE SCH MG: 100 INJECTION, POWDER, FOR SOLUTION INTRAMUSCULAR; INTRAVENOUS at 14:52

## 2017-04-01 RX ADMIN — DEXTROSE, SODIUM CHLORIDE, AND POTASSIUM CHLORIDE SCH MLS/HR: 5; .45; .3 INJECTION INTRAVENOUS at 09:37

## 2017-04-01 RX ADMIN — FENTANYL CITRATE PRN MCG: 50 INJECTION, SOLUTION INTRAMUSCULAR; INTRAVENOUS at 23:26

## 2017-04-01 RX ADMIN — ONDANSETRON PRN MG: 2 INJECTION, SOLUTION INTRAMUSCULAR; INTRAVENOUS at 23:27

## 2017-04-01 RX ADMIN — PROMETHAZINE HYDROCHLORIDE PRN MLS/HR: 25 INJECTION INTRAMUSCULAR; INTRAVENOUS at 00:13

## 2017-04-01 RX ADMIN — KETOROLAC TROMETHAMINE PRN MG: 15 INJECTION, SOLUTION INTRAMUSCULAR; INTRAVENOUS at 14:54

## 2017-04-01 RX ADMIN — KETOROLAC TROMETHAMINE PRN MG: 15 INJECTION, SOLUTION INTRAMUSCULAR; INTRAVENOUS at 05:01

## 2017-04-01 RX ADMIN — POTASSIUM CHLORIDE SCH MEQ: 1500 TABLET, EXTENDED RELEASE ORAL at 21:22

## 2017-04-01 RX ADMIN — MEROPENEM SCH MLS/HR: 1 INJECTION, POWDER, FOR SOLUTION INTRAVENOUS at 05:00

## 2017-04-01 RX ADMIN — FENTANYL CITRATE PRN MCG: 50 INJECTION, SOLUTION INTRAMUSCULAR; INTRAVENOUS at 14:53

## 2017-04-01 RX ADMIN — DEXTROSE, SODIUM CHLORIDE, AND POTASSIUM CHLORIDE SCH MLS/HR: 5; .45; .3 INJECTION INTRAVENOUS at 19:41

## 2017-04-01 RX ADMIN — MEROPENEM SCH MLS/HR: 1 INJECTION, POWDER, FOR SOLUTION INTRAVENOUS at 14:49

## 2017-04-01 RX ADMIN — HYDROCORTISONE SODIUM SUCCINATE SCH MG: 100 INJECTION, POWDER, FOR SOLUTION INTRAMUSCULAR; INTRAVENOUS at 05:01

## 2017-04-01 RX ADMIN — PROMETHAZINE HYDROCHLORIDE PRN MLS/HR: 25 INJECTION INTRAMUSCULAR; INTRAVENOUS at 18:50

## 2017-04-01 RX ADMIN — FLUDROCORTISONE ACETATE SCH MG: 0.1 TABLET ORAL at 14:49

## 2017-04-01 RX ADMIN — METOPROLOL TARTRATE SCH MG: 25 TABLET, FILM COATED ORAL at 09:00

## 2017-04-01 RX ADMIN — MEROPENEM SCH MLS/HR: 1 INJECTION, POWDER, FOR SOLUTION INTRAVENOUS at 21:23

## 2017-04-01 RX ADMIN — PROMETHAZINE HYDROCHLORIDE PRN MLS/HR: 25 INJECTION INTRAMUSCULAR; INTRAVENOUS at 09:47

## 2017-04-01 RX ADMIN — DEXTROSE, SODIUM CHLORIDE, AND POTASSIUM CHLORIDE SCH MLS/HR: 5; .45; .3 INJECTION INTRAVENOUS at 01:12

## 2017-04-01 RX ADMIN — FENTANYL CITRATE PRN MCG: 50 INJECTION, SOLUTION INTRAMUSCULAR; INTRAVENOUS at 18:51

## 2017-04-01 NOTE — PN
DATE:  04/01/2017



SUBJECTIVE:  The patient is resting, slightly propped up in bed, in no apparent

distress.  She continued to have headache and neck pain; however, she is eating

and drinking.  She has been afebrile over the last 72 hours.  Her urine culture

has grown Escherichia coli, which is pansensitive.  However, the blood culture

has grown 2 species of ____ cocci, the identification and sensitivity, which is

still pending.



PHYSICAL EXAMINATION:

GENERAL:  When I examined her this afternoon, she was pale, but no jaundice,

cyanosis, or thyromegaly.  No jugular venous distention.  No limb edema.

VITAL SIGNS:  Her heart rate was 69, blood pressure was 94/59, temperature was

98.7, respiratory rate was 20, and oxygen saturation was 98%.

HEAD, EYES, EARS, NOSE AND THROAT:  Showed normocephalic, atraumatic.

NECK:  Supple.

The rest of clinical examination is unremarkable, has not really changed.



Her intake over the last 24 hours was 3850, no output was recorded.



LABORATORY DATA:  As of this morning, her white cell count was 5200, hemoglobin

9.5, hematocrit 27.8, MCV 91, and platelet count of 142,000.  Her chemistry

showed a serum sodium 142, potassium 3.4, chloride 109, bicarbonate 25, anion

gap of 8, BUN 3, creatinine was 0.5, estimated GFR was 155 mL per minute.  Her

glucose was 116 and calcium was 8.



ASSESSMENT:

1.  A 21-year-old  female patient with immunodeficiency syndrome.

2.  She came in with febrile illness without obvious source of infection.

3.  She has a Port-A-Cath; however, white cell count remained normal, though she

did grow more than 100,000 colony forming units per mL of gram-negative rods

that identified as Escherichia coli that is pansensitive.  She also has grown

Gram-positive cocci from blood culture.  The identification and sensitivity is

still pending.

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

5.  Hashimoto's thyroiditis.

6.  Traumatic brain injury.



PLAN:  To cut down on IV fluid and start her on potassium supplement orally. 

Hopefully, we will have the identification and sensitivity of the gram-positive

cocci tomorrow and we can discharge her home to continue treatment as an

outpatient.





______________________________

MADISON MACKAY MD



DR:  Efren  JOB#:  [id number] / [id number]

DD:  04/01/2017 14:45  DT:  04/01/2017 21:26

## 2017-04-01 NOTE — PN
DATE:  03/31/2017



SUBJECTIVE:  The patient is resting, slightly propped up in bed, in no apparent

distress.  She continued to complain of headache that responds to pain

medication.  She is afebrile.  Her white cell count continues to be normal.  Her

urine culture grew more than 100,000 colony forming units per mL of

gram-negative rods, the identification and sensitivity which is still pending. 

Her blood culture also grew gram-positive cocci, the identification and

sensitivity is still pending at the time of this dictation.



PHYSICAL EXAMINATION:

GENERAL:  When I examined her this afternoon, she looked well and was clearly in

no apparent respiratory distress, pale, but not jaundiced, cyanosed.  No

lymphadenopathy or thyromegaly.  No jugular venous distention.  No limb edema.

VITAL SIGNS:  Her heart rate was 76, blood pressure was 115/66, temperature was

98.6, respiratory rate was 20, and oxygen saturation was 98%.

The rest of clinical examination is stable and has not really changed.  Her

intake over the last 24-hour was 3850.  No output was recorded.



LABORATORY DATA:  Showed a white cell count of 5700, hemoglobin 10.4, hematocrit

30.7, MCV 91, and platelet count of 144,000.  Serum sodium was 142, potassium

3.6, chloride 109, bicarbonate 25, anion gap of 8, BUN 2, creatinine 0.6,

estimated GFR was 126 mL per minute, her glucose was 97, calcium was 8.1.  Total

bilirubin, AST, ALT, alkaline phosphatase were normal.  Total protein 6.4,

albumin was 0.7.



ASSESSMENT:

1. A 21-year-old  female patient with immune deficiency syndrome.

2. She came in with febrile illness without obvious source of infection.

3. She has a Port-A-Cath; however, her white cell count remained normal.

4. She did grow more than 100,000 colony-forming units per mL of gram-negative

rods in her urine and grew also gram-positive cocci from her blood.  The

identification and sensitivity of both organism is still pending at the time of

this dictation.  The patient is known to have [place]'s disease for which she is

on hydrocortisone and she is now on a stress dose of hydrocortisone, Hashimoto's

thyroiditis, but the patient is euthyroid.



PLAN:  My plan is to continue with IV fluid, continue with pain management,

continue with IV meropenem, and continue with stress dose of hydrocortisone once

we will be able to narrow the antibiotic, once we have the identification and

sensitivity report of organisms.





______________________________

MADISON MACKAY MD



DR:  IVANA/clare  JOB#:  [id number] / [id number]

DD:  03/31/2017 13:11  DT:  04/01/2017 02:02

## 2017-04-02 VITALS — SYSTOLIC BLOOD PRESSURE: 122 MMHG | DIASTOLIC BLOOD PRESSURE: 83 MMHG

## 2017-04-02 VITALS — DIASTOLIC BLOOD PRESSURE: 69 MMHG | SYSTOLIC BLOOD PRESSURE: 118 MMHG

## 2017-04-02 LAB
ANION GAP SERPL CALC-SCNC: 8 MMOL/L (ref 6–14)
CA-I SERPL ISE-MCNC: 3 MG/DL (ref 7–20)
CALCIUM SERPL-MCNC: 8.2 MG/DL (ref 8.5–10.1)
CHLORIDE SERPL-SCNC: 107 MMOL/L (ref 98–107)
CO2 SERPL-SCNC: 28 MMOL/L (ref 21–32)
CREAT SERPL-MCNC: 0.5 MG/DL (ref 0.6–1)
GFR SERPLBLD BASED ON 1.73 SQ M-ARVRAT: 155.7 ML/MIN
GLUCOSE SERPL-MCNC: 101 MG/DL (ref 70–99)
POTASSIUM SERPL-SCNC: 3.4 MMOL/L (ref 3.5–5.1)
SODIUM SERPL-SCNC: 143 MMOL/L (ref 136–145)

## 2017-04-02 RX ADMIN — PANTOPRAZOLE SODIUM SCH MG: 40 TABLET, DELAYED RELEASE ORAL at 11:26

## 2017-04-02 RX ADMIN — MEROPENEM SCH MLS/HR: 1 INJECTION, POWDER, FOR SOLUTION INTRAVENOUS at 05:02

## 2017-04-02 RX ADMIN — METOPROLOL TARTRATE SCH MG: 25 TABLET, FILM COATED ORAL at 07:39

## 2017-04-02 RX ADMIN — FLUDROCORTISONE ACETATE SCH MG: 0.1 TABLET ORAL at 11:26

## 2017-04-02 RX ADMIN — DEXTROSE, SODIUM CHLORIDE, AND POTASSIUM CHLORIDE SCH MLS/HR: 5; .45; .3 INJECTION INTRAVENOUS at 10:45

## 2017-04-02 RX ADMIN — HYDROCORTISONE SODIUM SUCCINATE SCH MG: 100 INJECTION, POWDER, FOR SOLUTION INTRAMUSCULAR; INTRAVENOUS at 13:34

## 2017-04-02 RX ADMIN — HYDROCORTISONE SODIUM SUCCINATE SCH MG: 100 INJECTION, POWDER, FOR SOLUTION INTRAMUSCULAR; INTRAVENOUS at 05:01

## 2017-04-02 RX ADMIN — POTASSIUM CHLORIDE SCH MEQ: 1500 TABLET, EXTENDED RELEASE ORAL at 07:38

## 2017-04-02 RX ADMIN — PROMETHAZINE HYDROCHLORIDE PRN MLS/HR: 25 INJECTION INTRAMUSCULAR; INTRAVENOUS at 07:45

## 2017-04-02 RX ADMIN — MEROPENEM SCH MLS/HR: 1 INJECTION, POWDER, FOR SOLUTION INTRAVENOUS at 13:12

## 2017-04-02 RX ADMIN — FENTANYL CITRATE PRN MCG: 50 INJECTION, SOLUTION INTRAMUSCULAR; INTRAVENOUS at 10:40

## 2017-04-02 RX ADMIN — FENTANYL CITRATE PRN MCG: 50 INJECTION, SOLUTION INTRAMUSCULAR; INTRAVENOUS at 05:30

## 2017-04-02 RX ADMIN — KETOROLAC TROMETHAMINE PRN MG: 15 INJECTION, SOLUTION INTRAMUSCULAR; INTRAVENOUS at 07:45

## 2017-04-02 NOTE — DS
DATE OF DISCHARGE:  04/02/2017



HOSPITAL COURSE:  This is a 21-year-old  female patient, who is known

to have immune deficiency for which she gets immunoglobulin intravenously on a

regular basis and who was brought to the Emergency Room with a complaint of

fever, headache, neck pain, back pain, nausea.  She has no vision changes, but

does have some photophobia.  No earache or runny nose.  There is no sore throat.

 She was admitted and was started on IV fluid, IV meropenem after obtaining

appropriate cultures including blood and urine.  She actually remained afebrile

for the last 4 days.  Her white cell count was normal.  Her urine culture grew

more than 100,000 colony forming units per mL of Escherichia coli sensitive to

almost all antibiotics and her blood culture grew Streptococcus salivarius as

well as Streptococcus mitis.  All the 3 bacteria are sensitive to ceftriaxone. 

I spoke with  _____ , he recommended treating her with IV ceftriaxone 1 gram

daily for 10 days, to arrange for her to have an echocardiogram as an outpatient

and to repeat her blood culture and to monitor her lab work.  She should

continue with tapering course of hydrocortisone as directed.



PHYSICAL EXAMINATION:

GENERAL:  When I saw her this afternoon, she looked well and was clearly in no

apparent respiratory distress.  She was slightly pale, but no jaundice, cyanosis

or thyromegaly.  No jugular venous distention.  No limb edema.

VITAL SIGNS:  Her heart rate was 60, blood pressure 118/69, temperature was

98.6, respiratory rate 20, and oxygen saturation was 97% on room air.

The rest of the clinical exam is stable, has not really changed.



Her intake over the last 24 hours was 3000, output was 2850.



LABORATORY DATA:  As of this morning, her serum sodium was 143, potassium 3.4,

chloride 107, bicarbonate 28, anion gap of 8, BUN 3, creatinine 0.5, estimated

GFR was 155 mL per minute.  Her glucose was 101 and calcium was 8.2.  Her total

bilirubin, AST, ALT, and alkaline phosphatase were normal.  Total protein was

6.4, albumin was 2.6.



DISCHARGE MEDICATIONS:  The patient was discharged home to continue on oxycodone

immediate release 5 mg every 4 hours, Nexium 40 mg once a day, fludrocortisone

acetate 100 mcg once a day, hydrocortisone 10 mg once a day for 2 days and then

10 mg in the morning and 5 in the afternoon for 2 days, eventually should go

back to 5 mg twice a day.  She is on Linzess 290 mcg once a day, metoprolol

tartrate 25 mg p.o. b.i.d.



FINAL DISCHARGE DIAGNOSES:

1. Immune deficiency syndrome.

2. Gram-positive bacteremia growing 2 strains of bacteria, Streptococcus

salivarius and Streptococcus mitis.

3. Urinary tract infection growing Escherichia coli sensitive to almost all

antibiotics.

4. Other issues include [place] disease.

5. Hashimoto's thyroiditis, although her TSH is normal.



The patient will come as an outpatient daily for 10 days to get her IV

antibiotics and she will follow with . _____ on an as needed basis.





______________________________

MADISON MACKAY MD



DR:  IVANA/clare  JOB#:  [id number] / [id number]

DD:  04/02/2017 13:33  DT:  04/02/2017 13:55

## 2017-06-25 ENCOUNTER — HOSPITAL ENCOUNTER (EMERGENCY)
Dept: HOSPITAL 63 - ER | Age: 22
Discharge: HOME | End: 2017-06-25
Payer: OTHER GOVERNMENT

## 2017-06-25 VITALS — SYSTOLIC BLOOD PRESSURE: 117 MMHG | DIASTOLIC BLOOD PRESSURE: 87 MMHG

## 2017-06-25 DIAGNOSIS — Z91.041: ICD-10-CM

## 2017-06-25 DIAGNOSIS — Z87.820: ICD-10-CM

## 2017-06-25 DIAGNOSIS — K21.9: ICD-10-CM

## 2017-06-25 DIAGNOSIS — D84.9: ICD-10-CM

## 2017-06-25 DIAGNOSIS — R53.1: ICD-10-CM

## 2017-06-25 DIAGNOSIS — Z88.1: ICD-10-CM

## 2017-06-25 DIAGNOSIS — Z98.890: ICD-10-CM

## 2017-06-25 DIAGNOSIS — Z86.718: ICD-10-CM

## 2017-06-25 DIAGNOSIS — M25.551: Primary | ICD-10-CM

## 2017-06-25 DIAGNOSIS — J45.909: ICD-10-CM

## 2017-06-25 DIAGNOSIS — Z99.3: ICD-10-CM

## 2017-06-25 LAB
BASOPHILS # BLD AUTO: 0 X10^3/UL (ref 0–0.2)
BASOPHILS NFR BLD: 1 % (ref 0–3)
EOSINOPHIL NFR BLD: 0 % (ref 0–3)
EOSINOPHIL NFR BLD: 0 X10^3/UL (ref 0–0.7)
ERYTHROCYTE [DISTWIDTH] IN BLOOD BY AUTOMATED COUNT: 12.4 % (ref 11.5–14.5)
ERYTHROCYTE [SEDIMENTATION RATE] IN BLOOD: 34 MM/H (ref 0–25)
HCT VFR BLD CALC: 39.4 % (ref 36–47)
HGB BLD-MCNC: 13.4 G/DL (ref 12–15.5)
LYMPHOCYTES # BLD: 0.8 X10^3/UL (ref 1–4.8)
LYMPHOCYTES NFR BLD AUTO: 13 % (ref 24–48)
MCH RBC QN AUTO: 30 PG (ref 25–35)
MCHC RBC AUTO-ENTMCNC: 34 G/DL (ref 31–37)
MCV RBC AUTO: 90 FL (ref 79–100)
MONO #: 0.2 X10^3/UL (ref 0–1.1)
MONOCYTES NFR BLD: 3 % (ref 0–9)
NEUT #: 5 X10^3UL (ref 1.8–7.7)
NEUTROPHILS NFR BLD AUTO: 83 % (ref 31–73)
PLATELET # BLD AUTO: 261 X10^3/UL (ref 140–400)
RBC # BLD AUTO: 4.39 X10^6/UL (ref 3.5–5.4)
WBC # BLD AUTO: 6 X10^3/UL (ref 4–11)

## 2017-06-25 PROCEDURE — 85651 RBC SED RATE NONAUTOMATED: CPT

## 2017-06-25 PROCEDURE — 36415 COLL VENOUS BLD VENIPUNCTURE: CPT

## 2017-06-25 PROCEDURE — 72192 CT PELVIS W/O DYE: CPT

## 2017-06-25 PROCEDURE — 96372 THER/PROPH/DIAG INJ SC/IM: CPT

## 2017-06-25 PROCEDURE — 99285 EMERGENCY DEPT VISIT HI MDM: CPT

## 2017-06-25 PROCEDURE — 86140 C-REACTIVE PROTEIN: CPT

## 2017-06-25 PROCEDURE — 85027 COMPLETE CBC AUTOMATED: CPT

## 2017-06-25 NOTE — PHYS DOC
Past History


Past Medical History:  Asthma, DVT, GERD, Other


Additional Past Medical Histor:  history of TBI, history of autoimmune disorder.


Past Surgical History:  Tonsillectomy, Other


Smoking:  Non-smoker


Alcohol Use:  None


Drug Use:  None





Adult General


Chief Complaint


Chief Complaint:  HIP PAIN





HPI


HPI





Is a very pleasant 21-year-old female with a history of traumatic brain injury 

and residual weakness in her lower extremities bilaterally so she is now 

wheelchair bound. She's had a history of prior surgery to the right hip and a 

history of sepsis in the past. She presents with right hip pain that began 

spontaneously with no occult trauma. She did not feel a pop or she did not 

sustain a fall or an injury while TRANSFERRING. She notes a dull ache in her 

hip is worse with range of motion although not reproducible on exam. She says 

is mostly dull and 810 most of the time with sharp as weeks of pain that become 

tendon a 10. She denies any fevers, chills, nausea, vomiting, UTI symptoms or 

vaginal bleeding or discharge. She denies any localized swelling or rash. She 

denies any change to medications. Is concerned without actual immune system she 

may be suffering from an infection.





 She receives frequent immunoglobulin infusions.  She also has Hashimoto's 

thyroiditis, reflux, adrenal insufficiency, asthma, arrhythmias, and has a 

feeding tube in place. This user supplement feedings only of the patient is 

able tolerate by mouth food and fluids. She also is intermittently cathetered 

she has neurogenic bladder and she is on a bowel program according mother. 

Patient parents had some surgery and has a diffuse neuropathy of undetermined 

cause as well. 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 

left upper extremity. The patient is wheelchair-bound and the vast majority of 

care is accomplished by the mother. She is a nonsmoker and nonuser of ethanol.





Review of Systems


Review of Systems





Constitutional: Denies fever or chills []


Eyes: Denies change in visual acuity, redness, or eye pain []


HENT: Denies nasal congestion or sore throat []


Respiratory: Denies cough or shortness of breath []


Cardiovascular: No additional information not addressed in HPI []


GI: Denies abdominal pain, nausea, vomiting, bloody stools or diarrhea []


: Denies dysuria or hematuria []


Musculoskeletal: She is complaining primarily of right hip pain.


Integument: Denies rash or skin lesions []


Neurologic: Denies headache, focal weakness or sensory changes []


Endocrine: Denies polyuria or polydipsia []





Current Medications


Current Medications





Current Medications








 Medications


  (Trade)  Dose


 Ordered  Sig/Floyd  Start Time


 Stop Time Status Last Admin


Dose Admin


 


 Acetaminophen/


 Hydrocodone Bitart


  (Lortab 5/325)  2 tab  1X  ONCE  17 22:00


 17 22:01   


 











Allergies


Allergies





Allergies








Coded Allergies Type Severity Reaction Last Updated Verified


 


  erythromycin base Allergy Intermediate  16 Yes


 


  I S O L A T I O N *CONTACT* Allergy Unknown  16 Yes











Physical Exam


Physical Exam





Constitutional: She is thin and mildly pale but she is well-nourished because 

of a G-tube has been placed in her abdomen secondary to traumatic brain injury 

she was not eating.


HENT: Normocephalic, atraumatic, bilateral external ears normal, oropharynx 

moist, no oral exudates, nose normal. []


Eyes: PERRLA, EOMI, conjunctiva normal, no discharge. [] 


Neck: Normal range of motion, no tenderness, supple, no stridor. [] 


Cardiovascular:Heart rate regular rhythm, no murmur []


Lungs & Thorax:  Bilateral breath sounds clear to auscultation []


Abdomen: Bowel sounds normal, soft, no tenderness, no masses, no pulsatile 

masses. [] 


Skin: Warm, dry, no erythema, no rash. [] 


Extremities: Her muscles are thin and cachectic, she has normal rom at the hip 

with no clicking or popping with rom.  no warmth or swelling in the joint.  no 

rash.  no pain with axial loading of the joint.


Neurologic: Alert and oriented X 3, normal motor function, normal sensory 

function, no focal deficits noted. []


Psychologic: Affect normal, judgement normal, mood normal. []





Current Patient Data


Vital Signs





 Vital Signs








  Date Time  Temp Pulse Resp B/P (MAP) Pulse Ox O2 Delivery O2 Flow Rate FiO2


 


17 21:25 98.9 90 18  99 Room Air  











EKG


EKG


[]





Radiology/Procedures


Radiology/Procedures


[] [facility] [address], [place] [address] [phone]


 


 IMAGING REPORT





 Signed





PATIENT: DENNISE PATE ACCOUNT: [id number] MRN#: [id number]


: 1995 LOCATION: ER AGE: 21


SEX: F EXAM DT: 17 ACCESSION#: [id number]


STATUS: PRE ER ORD. PHYSICIAN: DIMA TANNER MD 


REASON: right hip pain non truamatic


PROCEDURE: CT PELVIS WO CONTRAST





CT pelvis without contrast


 


HISTORY: Right hip pain


 


Axial helical images were obtained of the pelvis without contrast and 


axial coronal and sagittal reconstruction was performed.


 


The visualized osseous structures appear normal. There is a stimulator on 


the left.


 


The uterus and bladder appear within normal limits and ovaries appear 


within normal limits. There is no free fluid or free air. There is no 


pericolonic inflammation. There appears to be a tampon in place.


 


IMPRESSION:


 


No acute findings.


 


Electronically signed by: Alyce Chen III, MD (2017 10:37 PM)














DICTATED AND SIGNED BY:     ALYCE CHEN III, MD


DATE:     17





CC: DIMA TANNER MD; ОЛЬГА NICHOLS ~





Course & Med Decision Making


Course & Med Decision Making


Pertinent Labs and Imaging studies reviewed. (See chart for details)  I have 

reviewed patient's nursing notes,





[]





Time is now 10:45 PM Patient tells me that their symptoms given during CC are 

improved. Although still present patient is getting a little nauseated. 

Laboratory work and radiology reports and not returned.


Time is now 11:28 PM Patient tells me that their symptoms given during CC are 

improved but she is still nauseated. I will provide her him Zofran. We reviewed 

labs and radiology reports with patient and any family at bedside. She has no 

white count although she has a slight left shift, her sedimentation rate is 

still pending and her CRP is normal. CT scan is reviewed by me and read by the 

radiologist demonstrates no bony fracture, no fluid collection, no evidence of 

intrapelvic issue causing her pain.





Her history of autoimmune disorder and inability to form a proper inflammatory 

response I do not believe this is a septic joint at this time but I would 

advise early follow-up with her primary care doctor tomorrow and schedule a 

possible MRI of the hip to ensure that she does not develop infection locally 

in her hip. At this point I do not believe this is a urinary tract infection. 

Patient is afebrile at this time she has no tenderness on axial loading into 

the hip itself. This may be a little osteoarthritis secondary to prior surgery. 

I would advise using oral pain medications and nausea medications and early 

follow-up with repeat evaluation by her primary care physician. Patient and 

family are okay with plan and will return here for any spiked fever or 

worsening symptoms despite treatment.





Impression: Right hip pain of unclear etiology


Disposition: PCP follow-up in the a.m. repeat evaluation referral for MRI of 

the hip.





Dragon Disclaimer


Dragon Disclaimer


This chart was dictated in whole or in part using Voice Recognition software in 

a busy, high-work load, and often noisy Emergency Department environment.  It 

may contain unintended and wholly unrecognized errors or omissions.





Departure


Departure:


Impression:  


 Primary Impression:  


 Immune deficiency disorder


 Additional Impression:  


 Hip pain, right


Disposition:  01 HOME, SELF-CARE


Condition:  STABLE


Referrals:  


ОЛЬГА NICHOLS (PCP)


Patient Instructions:  Hip Pain, Nausea, Adult





Additional Instructions:  


Although there is no evidence on my examination or with looking at your 

laboratory work that this is a septic joint, history of autoimmune disorder may 

prevent her from responding in a positive way. Although your CT scan not supine 

answer for your hip pain there is no evidence of fracture, fluid in the joint 

and is abnormal or localized soft tissue inflammation or infection. I would 

advise that we follow you up with her primary care doctor in the morning for 

referral for an MRI and possibly tapping the joint if your symptoms persist.


Scripts


Oxycodone Hcl/Acetaminophen (PERCOCET 5-325 MG TABLET) 1 Each Tablet


1-2 TAB PO Q4-6HRS, #10 TAB


   Prov: DIMA TANNER MD         17 


Ondansetron (ZOFRAN ODT) 8 Mg Tab.rapdis


8 MG PO TID for 7 Days


   Prov: DIMA TANNER MD         17





Problem Qualifiers











DIMA TANNER MD 2017 21:51

## 2017-06-25 NOTE — RAD
CT pelvis without contrast

 

HISTORY: Right hip pain

 

Axial helical images were obtained of the pelvis without contrast and 

axial coronal and sagittal reconstruction was performed.

 

The visualized osseous structures appear normal. There is a stimulator on 

the left.

 

The uterus and bladder appear within normal limits and ovaries appear 

within normal limits. There is no free fluid or free air. There is no 

pericolonic inflammation. There appears to be a tampon in place.

 

IMPRESSION:

 

No acute findings.

 

Electronically signed by: Zeke Martell III, MD (6/25/2017 10:37 PM)

## 2017-06-29 ENCOUNTER — HOSPITAL ENCOUNTER (OUTPATIENT)
Dept: HOSPITAL 61 - KCIC US | Age: 22
Discharge: HOME | End: 2017-06-29
Payer: OTHER GOVERNMENT

## 2017-06-29 ENCOUNTER — HOSPITAL ENCOUNTER (INPATIENT)
Dept: HOSPITAL 63 - ER | Age: 22
LOS: 8 days | Discharge: HOME | DRG: 872 | End: 2017-07-07
Attending: FAMILY MEDICINE | Admitting: FAMILY MEDICINE
Payer: OTHER GOVERNMENT

## 2017-06-29 ENCOUNTER — HOSPITAL ENCOUNTER (EMERGENCY)
Dept: HOSPITAL 61 - ER | Age: 22
Discharge: HOME | End: 2017-06-29
Payer: OTHER GOVERNMENT

## 2017-06-29 VITALS — WEIGHT: 108.5 LBS | BODY MASS INDEX: 17.23 KG/M2 | HEIGHT: 66.5 IN

## 2017-06-29 VITALS — HEIGHT: 66 IN | BODY MASS INDEX: 16.07 KG/M2 | WEIGHT: 100 LBS

## 2017-06-29 VITALS — SYSTOLIC BLOOD PRESSURE: 100 MMHG | DIASTOLIC BLOOD PRESSURE: 55 MMHG

## 2017-06-29 DIAGNOSIS — R10.30: Primary | ICD-10-CM

## 2017-06-29 DIAGNOSIS — D83.9: ICD-10-CM

## 2017-06-29 DIAGNOSIS — N73.9: ICD-10-CM

## 2017-06-29 DIAGNOSIS — R65.20: ICD-10-CM

## 2017-06-29 DIAGNOSIS — K40.90: ICD-10-CM

## 2017-06-29 DIAGNOSIS — N31.9: ICD-10-CM

## 2017-06-29 DIAGNOSIS — N39.0: Primary | ICD-10-CM

## 2017-06-29 DIAGNOSIS — T40.605A: ICD-10-CM

## 2017-06-29 DIAGNOSIS — Z79.51: ICD-10-CM

## 2017-06-29 DIAGNOSIS — N39.0: ICD-10-CM

## 2017-06-29 DIAGNOSIS — G82.20: ICD-10-CM

## 2017-06-29 DIAGNOSIS — K21.9: ICD-10-CM

## 2017-06-29 DIAGNOSIS — E27.1: ICD-10-CM

## 2017-06-29 DIAGNOSIS — E53.8: ICD-10-CM

## 2017-06-29 DIAGNOSIS — B96.20: ICD-10-CM

## 2017-06-29 DIAGNOSIS — D64.9: ICD-10-CM

## 2017-06-29 DIAGNOSIS — M25.551: ICD-10-CM

## 2017-06-29 DIAGNOSIS — Z79.899: ICD-10-CM

## 2017-06-29 DIAGNOSIS — A40.8: Primary | ICD-10-CM

## 2017-06-29 DIAGNOSIS — Z88.1: ICD-10-CM

## 2017-06-29 DIAGNOSIS — Z87.820: ICD-10-CM

## 2017-06-29 DIAGNOSIS — G57.90: ICD-10-CM

## 2017-06-29 DIAGNOSIS — Z99.3: ICD-10-CM

## 2017-06-29 DIAGNOSIS — E87.6: ICD-10-CM

## 2017-06-29 DIAGNOSIS — K59.03: ICD-10-CM

## 2017-06-29 DIAGNOSIS — J45.909: ICD-10-CM

## 2017-06-29 DIAGNOSIS — B49: ICD-10-CM

## 2017-06-29 DIAGNOSIS — D84.9: ICD-10-CM

## 2017-06-29 DIAGNOSIS — Z93.1: ICD-10-CM

## 2017-06-29 LAB
ALBUMIN SERPL-MCNC: 3.6 G/DL (ref 3.4–5)
ALBUMIN SERPL-MCNC: 3.8 G/DL (ref 3.4–5)
ALBUMIN/GLOB SERPL: 0.9 {RATIO} (ref 1–1.7)
ALBUMIN/GLOB SERPL: 1 {RATIO} (ref 1–1.7)
ALP SERPL-CCNC: 57 U/L (ref 46–116)
ALP SERPL-CCNC: 60 U/L (ref 46–116)
ALT SERPL-CCNC: 12 U/L (ref 14–59)
ALT SERPL-CCNC: 14 U/L (ref 14–59)
ANION GAP SERPL CALC-SCNC: 11 MMOL/L (ref 6–14)
ANION GAP SERPL CALC-SCNC: 13 MMOL/L (ref 6–14)
APTT PPP: YELLOW S
AST SERPL-CCNC: 10 U/L (ref 15–37)
AST SERPL-CCNC: 11 U/L (ref 15–37)
BACTERIA #/AREA URNS HPF: (no result) /HPF
BACTERIA #/AREA URNS HPF: 0 /HPF
BASOPHILS # BLD AUTO: 0 X10^3/UL (ref 0–0.2)
BASOPHILS # BLD AUTO: 0 X10^3/UL (ref 0–0.2)
BASOPHILS NFR BLD: 0 % (ref 0–3)
BASOPHILS NFR BLD: 0 % (ref 0–3)
BILIRUB SERPL-MCNC: 0.6 MG/DL (ref 0.2–1)
BILIRUB SERPL-MCNC: 0.6 MG/DL (ref 0.2–1)
BILIRUB UR QL STRIP: (no result)
BILIRUB UR QL STRIP: NEGATIVE
BUN SERPL-MCNC: 9 MG/DL (ref 7–20)
BUN/CREAT SERPL: 10 (ref 6–20)
BUN/CREAT SERPL: 15 (ref 6–20)
CA-I SERPL ISE-MCNC: 5 MG/DL (ref 7–20)
CALCIUM SERPL-MCNC: 8.1 MG/DL (ref 8.5–10.1)
CALCIUM SERPL-MCNC: 9 MG/DL (ref 8.5–10.1)
CHLORIDE SERPL-SCNC: 101 MMOL/L (ref 98–107)
CHLORIDE SERPL-SCNC: 105 MMOL/L (ref 98–107)
CO2 SERPL-SCNC: 22 MMOL/L (ref 21–32)
CO2 SERPL-SCNC: 22 MMOL/L (ref 21–32)
CREAT SERPL-MCNC: 0.5 MG/DL (ref 0.6–1)
CREAT SERPL-MCNC: 0.6 MG/DL (ref 0.6–1)
EOSINOPHIL NFR BLD: 0 % (ref 0–3)
EOSINOPHIL NFR BLD: 0 % (ref 0–3)
EOSINOPHIL NFR BLD: 0 X10^3/UL (ref 0–0.7)
ERYTHROCYTE [DISTWIDTH] IN BLOOD BY AUTOMATED COUNT: 12.5 % (ref 11.5–14.5)
ERYTHROCYTE [DISTWIDTH] IN BLOOD BY AUTOMATED COUNT: 12.9 % (ref 11.5–14.5)
FIBRINOGEN PPP-MCNC: CLEAR MG/DL
GFR SERPLBLD BASED ON 1.73 SQ M-ARVRAT: 126.2 ML/MIN
GFR SERPLBLD BASED ON 1.73 SQ M-ARVRAT: 155.7 ML/MIN
GLOBULIN SER-MCNC: 3.7 G/DL (ref 2.2–3.8)
GLOBULIN SER-MCNC: 3.8 G/DL (ref 2.2–3.8)
GLUCOSE SERPL-MCNC: 84 MG/DL (ref 70–99)
GLUCOSE SERPL-MCNC: 97 MG/DL (ref 70–99)
GLUCOSE UR STRIP-MCNC: (no result) MG/DL
GLUCOSE UR STRIP-MCNC: NEGATIVE MG/DL
HCT VFR BLD CALC: 36.5 % (ref 36–47)
HCT VFR BLD CALC: 38 % (ref 36–47)
HGB BLD-MCNC: 12.3 G/DL (ref 12–15.5)
HGB BLD-MCNC: 12.9 G/DL (ref 12–15.5)
LYMPHOCYTES # BLD: 0.3 X10^3/UL (ref 1–4.8)
LYMPHOCYTES # BLD: 0.7 X10^3/UL (ref 1–4.8)
LYMPHOCYTES NFR BLD AUTO: 17 % (ref 24–48)
LYMPHOCYTES NFR BLD AUTO: 2 % (ref 24–48)
MCH RBC QN AUTO: 30 PG (ref 25–35)
MCH RBC QN AUTO: 30 PG (ref 25–35)
MCHC RBC AUTO-ENTMCNC: 34 G/DL (ref 31–37)
MCHC RBC AUTO-ENTMCNC: 34 G/DL (ref 31–37)
MCV RBC AUTO: 89 FL (ref 79–100)
MCV RBC AUTO: 90 FL (ref 79–100)
MONO #: 0.2 X10^3/UL (ref 0–1.1)
MONOCYTES NFR BLD: 1 % (ref 0–9)
MONOCYTES NFR BLD: 2 % (ref 0–9)
NEUT #: 11.8 X10^3UL (ref 1.8–7.7)
NEUTROPHILS NFR BLD AUTO: 82 % (ref 31–73)
NEUTROPHILS NFR BLD AUTO: 96 % (ref 31–73)
NITRITE UR QL STRIP: (no result)
NITRITE UR QL STRIP: POSITIVE
PH UR STRIP: 5.5 [PH]
PLATELET # BLD AUTO: 159 X10^3/UL (ref 140–400)
PLATELET # BLD AUTO: 199 X10^3/UL (ref 140–400)
POTASSIUM SERPL-SCNC: 3.5 MMOL/L (ref 3.5–5.1)
POTASSIUM SERPL-SCNC: 3.6 MMOL/L (ref 3.5–5.1)
PROT SERPL-MCNC: 7.4 G/DL (ref 6.4–8.2)
PROT SERPL-MCNC: 7.5 G/DL (ref 6.4–8.2)
PROT UR STRIP-MCNC: NEGATIVE MG/DL
RBC # BLD AUTO: 4.08 X10^6/UL (ref 3.5–5.4)
RBC # BLD AUTO: 4.25 X10^6/UL (ref 3.5–5.4)
RBC #/AREA URNS HPF: 0 /HPF (ref 0–2)
RBC #/AREA URNS HPF: 0 /HPF (ref 0–2)
SODIUM SERPL-SCNC: 136 MMOL/L (ref 136–145)
SODIUM SERPL-SCNC: 138 MMOL/L (ref 136–145)
SP GR UR STRIP: 1.01
SP GR UR STRIP: 1.02
SQUAMOUS #/AREA URNS LPF: (no result) /LPF
SQUAMOUS #/AREA URNS LPF: (no result) /LPF
UROBILINOGEN UR-MCNC: 0.2 MG/DL
UROBILINOGEN UR-MCNC: 1 MG/DL
WBC # BLD AUTO: 12.3 X10^3/UL (ref 4–11)
WBC # BLD AUTO: 4.3 X10^3/UL (ref 4–11)
WBC #/AREA URNS HPF: (no result) /HPF (ref 0–4)
WBC #/AREA URNS HPF: (no result) /HPF (ref 0–4)

## 2017-06-29 PROCEDURE — 81001 URINALYSIS AUTO W/SCOPE: CPT

## 2017-06-29 PROCEDURE — 87641 MR-STAPH DNA AMP PROBE: CPT

## 2017-06-29 PROCEDURE — 36415 COLL VENOUS BLD VENIPUNCTURE: CPT

## 2017-06-29 PROCEDURE — 87040 BLOOD CULTURE FOR BACTERIA: CPT

## 2017-06-29 PROCEDURE — 74177 CT ABD & PELVIS W/CONTRAST: CPT

## 2017-06-29 PROCEDURE — 85007 BL SMEAR W/DIFF WBC COUNT: CPT

## 2017-06-29 PROCEDURE — 96375 TX/PRO/DX INJ NEW DRUG ADDON: CPT

## 2017-06-29 PROCEDURE — 80202 ASSAY OF VANCOMYCIN: CPT

## 2017-06-29 PROCEDURE — 93325 DOPPLER ECHO COLOR FLOW MAPG: CPT

## 2017-06-29 PROCEDURE — 83605 ASSAY OF LACTIC ACID: CPT

## 2017-06-29 PROCEDURE — 76881 US COMPL JOINT R-T W/IMG: CPT

## 2017-06-29 PROCEDURE — 83735 ASSAY OF MAGNESIUM: CPT

## 2017-06-29 PROCEDURE — 93005 ELECTROCARDIOGRAM TRACING: CPT

## 2017-06-29 PROCEDURE — 85027 COMPLETE CBC AUTOMATED: CPT

## 2017-06-29 PROCEDURE — 93312 ECHO TRANSESOPHAGEAL: CPT

## 2017-06-29 PROCEDURE — 99285 EMERGENCY DEPT VISIT HI MDM: CPT

## 2017-06-29 PROCEDURE — 71010: CPT

## 2017-06-29 PROCEDURE — 81025 URINE PREGNANCY TEST: CPT

## 2017-06-29 PROCEDURE — 80048 BASIC METABOLIC PNL TOTAL CA: CPT

## 2017-06-29 PROCEDURE — 80053 COMPREHEN METABOLIC PANEL: CPT

## 2017-06-29 PROCEDURE — 96361 HYDRATE IV INFUSION ADD-ON: CPT

## 2017-06-29 PROCEDURE — 87205 SMEAR GRAM STAIN: CPT

## 2017-06-29 PROCEDURE — 94640 AIRWAY INHALATION TREATMENT: CPT

## 2017-06-29 PROCEDURE — 96374 THER/PROPH/DIAG INJ IV PUSH: CPT

## 2017-06-29 PROCEDURE — 87186 SC STD MICRODIL/AGAR DIL: CPT

## 2017-06-29 PROCEDURE — 96376 TX/PRO/DX INJ SAME DRUG ADON: CPT

## 2017-06-29 PROCEDURE — 87086 URINE CULTURE/COLONY COUNT: CPT

## 2017-06-29 RX ADMIN — FENTANYL CITRATE PRN MCG: 50 INJECTION INTRAMUSCULAR; INTRAVENOUS at 15:09

## 2017-06-29 RX ADMIN — FENTANYL CITRATE PRN MCG: 50 INJECTION INTRAMUSCULAR; INTRAVENOUS at 17:12

## 2017-06-29 NOTE — [facility]
Targeted ultrasound of the right groin

 

HISTORY: Right hip pain, evaluate for inguinal hernia.

 

FINDINGS:

Targeted ultrasound is performed in the right groin. No evidence of 

herniated bowel. No evidence of abnormal fluid collection or other 

abnormality.

 

IMPRESSION: No sonographic evidence of bowel herniation in the right 

groin.

 

Electronically signed by: Robbi Pina MD (6/29/2017 2:18 PM)

## 2017-06-29 NOTE — RAD
Indication: Right groin and hip pain as well as fever.



Axial imaging through the abdomen and pelvis was performed after the

administration of intravenous contrast.



PQRS Compliance Statement:



One or more of the following individualized dose reduction techniques were

utilized for this examination:  

1. Automated exposure control  

2. Adjustment of the mA and/or kV according to patient size  

3. Use of iterative reconstruction technique



The lung bases are clear. The liver and gallbladder are unremarkable. The

pancreas and spleen are unremarkable. There is a G-tube in place. No adrenal

mass is identified. The kidneys are unremarkable. The aorta is nonaneurysmal.

The small and large bowel loops are normal caliber. There is no free fluid in

the abdomen or pelvis. No central retroperitoneal or mesenteric

lymphadenopathy is detected.



Imaging through the pelvis demonstrates the uterus and bladder to be

unremarkable. No pelvic lymphadenopathy is seen. Both hips show normal femoral

acetabular alignment. No acute bony abnormality is seen. No definite hip joint

effusion is detected. Both groins are unremarkable.



Impression: Unremarkable CT of the abdomen and pelvis.

## 2017-06-29 NOTE — PHYS DOC
Past Medical History


Past Medical History:  Anemia, Asthma, GERD, Other


Additional Past Medical Histor:  TBI, L BRACHIAL PLEXUS, NUEROGENIC BLADDER, 

ADRENAL INSUFFICINCIES, HASHYMO


Past Surgical History:  Tonsillectomy, Other


Additional Past Surgical Histo:  FEEDING TUBE, PORT LEFT CHEST, FUNDLE


Alcohol Use:  None


Drug Use:  None





Adult General


Chief Complaint


Chief Complaint:  HIP PAIN





HPI


HPI





Patient is a 21  year old female who has a history of "common variable immune 

deficiency and PIDD" as well as traumatic brain injury, wheelchair dependent, 

brought to the ED by her mother with a complaint of severe pain in her right 

hip area since Sunday 6/25. There is no injury. Sunday morning she woke up 

complaining of severe pain and vomiting. She was seen at the [facility] emergency 

department and diagnosed with a possible infection. Since then she has seen her 

infectious disease specialist, Dr. Gillis, she had an ultrasound here today of 

the area, and she was scheduled to have an MRI but they were not able to do it 

because she has a neurostimulator. Patient gets IVIG via a port every 3 weeks, 

her last one was last week. She recently was hospitalized with sepsis, they did 

not find a source. Sometimes her source is her urine/kidney infection. She is 

not currently on antibiotics.





This morning at 3 AM she had a temp of 99.8. That is high for her. She had a 

dose of 10 mg hydrocodone at 10 AM and a dose of 800 mg ibuprofen at 1:00.





The patient has had some vomiting, no other complaints.





Infectious disease specialist Dr. Gillis


PCP at [facility]





Review of Systems


Review of Systems





Constitutional: As in history of present illness, MAXIMUM TEMPERATURE 99 8 in 

the last 24 hours


Eyes: Denies change in visual acuity, redness, or eye pain []


HENT: Denies nasal congestion or sore throat []


Respiratory: Denies cough or shortness of breath []


Cardiovascular: Denies chest pain


GI: Nausea and vomiting


: Denies dysuria or hematuria []


Musculoskeletal: Right hip area pain as in history of present illness, no other 

joint pain


Integument: Denies rash or skin lesions []


Neurologic: Denies headache, focal weakness or sensory changes []





Current Medications


Current Medications





Current Medications








 Medications


  (Trade)  Dose


 Ordered  Sig/Floyd  Start Time


 Stop Time Status Last Admin


Dose Admin


 


 Fentanyl Citrate


  (Fentanyl 2ml


 Vial)  50 mcg  PRN Q15MIN  PRN  6/29/17 14:45


 6/30/17 14:44  6/29/17 15:09


50 MCG


 


 Info


  (Do NOT chart on


 this entry -- for


 MONITORING)  1 each  PRN DAILY  PRN  6/29/17 15:15


 7/1/17 15:14   


 


 


 Iohexol


  (Omnipaque 300


 Mg/ml)  75 ml  1X  ONCE  6/29/17 15:30


 6/29/17 15:31 DC 6/29/17 16:01


75 ML


 


 Ondansetron HCl


  (Zofran)  4 mg  1X  ONCE  6/29/17 15:00


 6/29/17 15:01 DC 6/29/17 15:08


4 MG


 


 Sodium Chloride


  (NORMAL SALINE


 FLUSH for STERILE


 FIELD)  10 ml  STK-MED ONCE  6/29/17 14:43


 6/29/17 14:44 DC  


 











Allergies


Allergies





Allergies








Coded Allergies Type Severity Reaction Last Updated Verified


 


  erythromycin base Allergy Intermediate RASH 6/29/17 Yes











Physical Exam


Physical Exam





Constitutional: Small, frail appearing 21-year-old who is alert and cooperative 

and appears to be mentating normally, curled up and crying with pain, but when 

I talked to her she was able to lay back and cooperate with the exam without as 

much distress apparent


HENT: Normocephalic, atraumatic, bilateral external ears normal, nose normal. []


Eyes:  conjunctiva normal, no discharge. [] 


Neck: Normal range of motion, no stridor. [] 


Cardiovascular:Heart rate regular rhythm, no murmur []


Lungs & Thorax:  Bilateral breath sounds clear to auscultation []


Abdomen: Bowel sounds normal, soft, no tenderness, no masses, no pulsatile 

masses. Right lower abdomen is nontender to palpation without masses.


Right inguinal area is the site of the patient's pain. She indicates the pain 

in approximately the mid area of the inguinal crease. There is no palpable mass

, lymphadenopathy or hernia in this area. There is no overlying skin color 

change. Patient has good range of motion both actively and passively of the 

right hip. I am able to flex, rotate internally and externally, AB and adduct 

right hip without any significant worsening of her pain.


Skin: Warm, dry, no erythema, no rash. [] 


Extremities: Extremities are without swelling, deformity, tenderness, or 

limitation of range of motion


Neurologic: Alert and oriented X 3, normal motor function, normal sensory 

function, no focal deficits noted. []





Current Patient Data


Vital Signs





 Vital Signs








  Date Time  Temp Pulse Resp B/P (MAP) Pulse Ox O2 Delivery O2 Flow Rate FiO2


 


6/29/17 15:46      Room Air  


 


6/29/17 14:09 98.7 90 18 159/88 (111) 100   





 98.7       








Lab Values





 Laboratory Tests








Test


  6/29/17


13:23 6/29/17


14:15 6/29/17


14:45


 


POC Urine HCG, Qualitative


  Hcg negative


(Negative) 


  


 


 


Urine Collection Type  U cath   


 


Urine Color  Yellow   


 


Urine Clarity  Clear   


 


Urine pH  5.5   


 


Urine Specific Gravity  1.020   


 


Urine Protein


  


  Negative mg/dL


(NEG-TRACE) 


 


 


Urine Glucose (UA)


  


  Negative mg/dL


(NEG) 


 


 


Urine Ketones (Stick)


  


  Negative mg/dL


(NEG) 


 


 


Urine Blood


  


  Negative (NEG)


  


 


 


Urine Nitrite


  


  Positive (NEG)


  


 


 


Urine Bilirubin


  


  Negative (NEG)


  


 


 


Urine Urobilinogen Dipstick


  


  0.2 mg/dL (0.2


mg/dL) 


 


 


Urine Leukocyte Esterase  Small (NEG)   


 


Urine RBC  0 /HPF (0-2)   


 


Urine WBC


  


  1-4 /HPF (0-4)


  


 


 


Urine Squamous Epithelial


Cells 


  Mod /LPF  


  


 


 


Urine Bacteria


  


  Many /HPF


(0-FEW) 


 


 


Urine Mucus  Mod /LPF   


 


White Blood Count


  


  


  4.3 x10^3/uL


(4.0-11.0)


 


Red Blood Count


  


  


  4.25 x10^6/uL


(3.50-5.40)


 


Hemoglobin


  


  


  12.9 g/dL


(12.0-15.5)


 


Hematocrit


  


  


  38.0 %


(36.0-47.0)


 


Mean Corpuscular Volume


  


  


  90 fL ()


 


 


Mean Corpuscular Hemoglobin   30 pg (25-35)  


 


Mean Corpuscular Hemoglobin


Concent 


  


  34 g/dL


(31-37)


 


Red Cell Distribution Width


  


  


  12.9 %


(11.5-14.5)


 


Platelet Count


  


  


  199 x10^3/uL


(140-400)


 


Neutrophils (%) (Auto)   82 % (31-73)  H


 


Lymphocytes (%) (Auto)   17 % (24-48)  L


 


Monocytes (%) (Auto)   1 % (0-9)  


 


Eosinophils (%) (Auto)   0 % (0-3)  


 


Basophils (%) (Auto)   0 % (0-3)  


 


Neutrophils # (Auto)


  


  


  3.5 x10^3uL


(1.8-7.7)


 


Lymphocytes # (Auto)


  


  


  0.7 x10^3/uL


(1.0-4.8)  L


 


Monocytes # (Auto)


  


  


  0.0 x10^3/uL


(0.0-1.1)


 


Eosinophils # (Auto)


  


  


  0.0 x10^3/uL


(0.0-0.7)


 


Basophils # (Auto)


  


  


  0.0 x10^3/uL


(0.0-0.2)


 


Sodium Level


  


  


  138 mmol/L


(136-145)


 


Potassium Level


  


  


  3.5 mmol/L


(3.5-5.1)


 


Chloride Level


  


  


  105 mmol/L


()


 


Carbon Dioxide Level


  


  


  22 mmol/L


(21-32)


 


Anion Gap   11 (6-14)  


 


Blood Urea Nitrogen


  


  


  9 mg/dL (7-20)


 


 


Creatinine


  


  


  0.6 mg/dL


(0.6-1.0)


 


Estimated GFR


(Cockcroft-Gault) 


  


  126.2  


 


 


BUN/Creatinine Ratio   15 (6-20)  


 


Glucose Level


  


  


  84 mg/dL


(70-99)


 


Lactic Acid Level


  


  


  1.0 mmol/L


(0.4-2.0)


 


Calcium Level


  


  


  9.0 mg/dL


(8.5-10.1)


 


Total Bilirubin


  


  


  0.6 mg/dL


(0.2-1.0)


 


Aspartate Amino Transferase


(AST) 


  


  10 U/L (15-37)


L


 


Alanine Aminotransferase (ALT)


  


  


  14 U/L (14-59)


 


 


Alkaline Phosphatase


  


  


  60 U/L


()


 


Total Protein


  


  


  7.5 g/dL


(6.4-8.2)


 


Albumin


  


  


  3.8 g/dL


(3.4-5.0)


 


Albumin/Globulin Ratio   1.0 (1.0-1.7)  





 Laboratory Tests


6/29/17 14:45








 Laboratory Tests


6/29/17 14:45














EKG


EKG


[]





Radiology/Procedures


Radiology/Procedures


Ultrasound from earlier today reviewed by me. No hernia, no other abnormality 

noted on the ultrasound in the right inguinal area. 





CT scan of the abdomen and pelvis including the right hip joint read by the 

radiologist. The CT scan is unremarkable including no pelvic lymphadenopathy, 

both hips normal alignment, no definite hip joint effusion is detected, both 

groins are unremarkable. []





Course & Med Decision Making


Course & Med Decision Making


Pertinent Labs and Imaging studies reviewed. (See chart for details)





21-year-old female who has immune deficiency brought in by her mother with 

severe pain in the right hip for 5 days now, concern for infected joint/"sepsis

". I discussed the case with , on-call for infectious disease. We 

agreed to do blood work and consult radiology regarding imaging of the area.





I discussed the case with Dr. Angulo, radiologist. He will evaluate the CT scan 

of the abdomen and pelvis including the hip joint. He recommended IV contrast 

which I ordered. CT scan was unrevealing for any potential causes of pain and 

also did not show a joint effusion of the right hip joint.





Labs unremarkable. Urinalysis is equivocal but contaminated.





On my clinical exam, I do not believe that the patient's pain is necessarily 

from her hip joint. Her pain is localized to her right inguinal area, but exam 

and imaging do not show any abnormality in this area. Distal exam of the right 

leg is unremarkable and normal.





I discussed the lab results and CT results with . He believes we should 

try treating the patient with antibiotics for a possible UTI.





1640 rechecked patient after CT. She is laying apparently comfortably on the bed

, no longer crying, curled up with her service dog. I discussed the findings 

and my discussion with Dr. Dye with the patient and her mother. Mother is in 

agreement with treatment for possible UTI with Levaquin. She will check with 

the patient's primary care provider tomorrow to see what her urine culture 

results and sensitivity showed. We will start Levaquin at this time.





See instructions for plan.





[]





Dragon Disclaimer


Dragon Disclaimer


This electronic medical record was generated, in whole or in part, using a 

voice recognition dictation system.





Departure


Departure


Impression:  


 Primary Impression:  


 Right inguinal pain


 Additional Impression:  


 UTI (urinary tract infection)


Disposition:  01 HOME, SELF-CARE


Condition:  STABLE


Referrals:  


[facility] (PCP)





Additional Instructions:  


As we discussed, we will start Levaquin, an antibiotic, for likely UTI. Check 

the sensitivities on the urine culture with her primary care doctor.





Heat to the area to see if that helps.





As we discussed, I do agree with your plan to recheck with orthopedics in case 

this is musculoskeletal in nature.





Return to emergency or call your doctor for temperature 100.4 or greater.


Scripts


Levofloxacin (LEVAQUIN) 500 Mg Tablet


1 TAB PO DAILY for uti, #7 TAB


   Prov: FANY PUENTES MD         6/29/17





Problem Qualifiers











FANY PUENTES MD Jun 29, 2017 16:42

## 2017-06-30 VITALS — SYSTOLIC BLOOD PRESSURE: 98 MMHG | DIASTOLIC BLOOD PRESSURE: 58 MMHG

## 2017-06-30 VITALS — SYSTOLIC BLOOD PRESSURE: 99 MMHG | DIASTOLIC BLOOD PRESSURE: 67 MMHG

## 2017-06-30 VITALS — SYSTOLIC BLOOD PRESSURE: 97 MMHG | DIASTOLIC BLOOD PRESSURE: 61 MMHG

## 2017-06-30 VITALS — DIASTOLIC BLOOD PRESSURE: 52 MMHG | SYSTOLIC BLOOD PRESSURE: 93 MMHG

## 2017-06-30 VITALS — DIASTOLIC BLOOD PRESSURE: 64 MMHG | SYSTOLIC BLOOD PRESSURE: 106 MMHG

## 2017-06-30 VITALS — SYSTOLIC BLOOD PRESSURE: 105 MMHG | DIASTOLIC BLOOD PRESSURE: 54 MMHG

## 2017-06-30 VITALS — DIASTOLIC BLOOD PRESSURE: 48 MMHG | SYSTOLIC BLOOD PRESSURE: 97 MMHG

## 2017-06-30 VITALS — SYSTOLIC BLOOD PRESSURE: 100 MMHG | DIASTOLIC BLOOD PRESSURE: 58 MMHG

## 2017-06-30 VITALS — DIASTOLIC BLOOD PRESSURE: 50 MMHG | SYSTOLIC BLOOD PRESSURE: 85 MMHG

## 2017-06-30 VITALS — DIASTOLIC BLOOD PRESSURE: 54 MMHG | SYSTOLIC BLOOD PRESSURE: 100 MMHG

## 2017-06-30 VITALS — SYSTOLIC BLOOD PRESSURE: 95 MMHG | DIASTOLIC BLOOD PRESSURE: 52 MMHG

## 2017-06-30 VITALS — DIASTOLIC BLOOD PRESSURE: 49 MMHG | SYSTOLIC BLOOD PRESSURE: 89 MMHG

## 2017-06-30 VITALS — SYSTOLIC BLOOD PRESSURE: 97 MMHG | DIASTOLIC BLOOD PRESSURE: 59 MMHG

## 2017-06-30 LAB
AMPHETAMINE/METHAMPHETAMINE: (no result)
BARBITURATES UR-MCNC: (no result) UG/ML
BENZODIAZ UR-MCNC: (no result) UG/L
CANNABINOIDS UR-MCNC: (no result) UG/L
COCAINE UR-MCNC: (no result) NG/ML
METHADONE SERPL-MCNC: (no result) NG/ML
OPIATES UR-MCNC: (no result) NG/ML
PCP SERPL-MCNC: (no result) MG/DL

## 2017-06-30 RX ADMIN — PIPERACILLIN SODIUM AND TAZOBACTAM SODIUM SCH MLS/HR: 3; .375 INJECTION, POWDER, LYOPHILIZED, FOR SOLUTION INTRAVENOUS at 11:56

## 2017-06-30 RX ADMIN — PANTOPRAZOLE SODIUM SCH MG: 40 TABLET, DELAYED RELEASE ORAL at 11:56

## 2017-06-30 RX ADMIN — HYDROCORTISONE SODIUM SUCCINATE SCH MG: 100 INJECTION, POWDER, FOR SOLUTION INTRAMUSCULAR; INTRAVENOUS at 22:42

## 2017-06-30 RX ADMIN — FENTANYL CITRATE PRN MCG: 50 INJECTION, SOLUTION INTRAMUSCULAR; INTRAVENOUS at 21:07

## 2017-06-30 RX ADMIN — SODIUM CHLORIDE SCH MLS/HR: 0.9 INJECTION, SOLUTION INTRAVENOUS at 14:00

## 2017-06-30 RX ADMIN — SODIUM CHLORIDE SCH MLS/HR: 0.9 INJECTION, SOLUTION INTRAVENOUS at 06:36

## 2017-06-30 RX ADMIN — SODIUM CHLORIDE SCH MLS/HR: 0.9 INJECTION, SOLUTION INTRAVENOUS at 02:30

## 2017-06-30 RX ADMIN — PROMETHAZINE HYDROCHLORIDE PRN MLS/HR: 25 INJECTION INTRAMUSCULAR; INTRAVENOUS at 11:57

## 2017-06-30 RX ADMIN — SODIUM CHLORIDE SCH MLS/HR: 0.9 INJECTION, SOLUTION INTRAVENOUS at 22:42

## 2017-06-30 RX ADMIN — FLUDROCORTISONE ACETATE SCH MG: 0.1 TABLET ORAL at 11:56

## 2017-06-30 RX ADMIN — FENTANYL CITRATE PRN MCG: 50 INJECTION, SOLUTION INTRAMUSCULAR; INTRAVENOUS at 10:56

## 2017-06-30 RX ADMIN — VANCOMYCIN HYDROCHLORIDE SCH MLS/HR: 750 INJECTION, POWDER, LYOPHILIZED, FOR SOLUTION INTRAVENOUS at 21:08

## 2017-06-30 RX ADMIN — ALBUTEROL SULFATE SCH MG: 108 AEROSOL, METERED RESPIRATORY (INHALATION) at 20:51

## 2017-06-30 RX ADMIN — ENOXAPARIN SODIUM SCH MG: 100 INJECTION SUBCUTANEOUS at 10:56

## 2017-06-30 RX ADMIN — ALBUTEROL SULFATE SCH MG: 108 AEROSOL, METERED RESPIRATORY (INHALATION) at 11:07

## 2017-06-30 RX ADMIN — METOPROLOL TARTRATE SCH MG: 25 TABLET, FILM COATED ORAL at 20:40

## 2017-06-30 RX ADMIN — VANCOMYCIN HYDROCHLORIDE PRN EACH: 1 INJECTION, POWDER, LYOPHILIZED, FOR SOLUTION INTRAVENOUS at 08:05

## 2017-06-30 RX ADMIN — FENTANYL CITRATE PRN MCG: 50 INJECTION, SOLUTION INTRAMUSCULAR; INTRAVENOUS at 18:08

## 2017-06-30 RX ADMIN — FENTANYL CITRATE PRN MCG: 50 INJECTION, SOLUTION INTRAMUSCULAR; INTRAVENOUS at 16:00

## 2017-06-30 RX ADMIN — HYDROCORTISONE SODIUM SUCCINATE SCH MG: 100 INJECTION, POWDER, FOR SOLUTION INTRAMUSCULAR; INTRAVENOUS at 11:56

## 2017-06-30 RX ADMIN — PIPERACILLIN SODIUM AND TAZOBACTAM SODIUM SCH MLS/HR: 3; .375 INJECTION, POWDER, LYOPHILIZED, FOR SOLUTION INTRAVENOUS at 18:01

## 2017-06-30 RX ADMIN — VANCOMYCIN HYDROCHLORIDE SCH MLS/HR: 750 INJECTION, POWDER, LYOPHILIZED, FOR SOLUTION INTRAVENOUS at 13:47

## 2017-06-30 RX ADMIN — BUDESONIDE SCH MG: 0.5 SUSPENSION RESPIRATORY (INHALATION) at 20:51

## 2017-06-30 RX ADMIN — FENTANYL CITRATE PRN MCG: 50 INJECTION, SOLUTION INTRAMUSCULAR; INTRAVENOUS at 08:20

## 2017-06-30 RX ADMIN — METOPROLOL TARTRATE SCH MG: 25 TABLET, FILM COATED ORAL at 10:47

## 2017-06-30 RX ADMIN — PROMETHAZINE HYDROCHLORIDE PRN MLS/HR: 25 INJECTION INTRAMUSCULAR; INTRAVENOUS at 22:54

## 2017-06-30 RX ADMIN — ALBUTEROL SULFATE SCH MG: 108 AEROSOL, METERED RESPIRATORY (INHALATION) at 18:01

## 2017-06-30 NOTE — EKG
[facility] [address], [place] [address]

Test Date:    2017               Test Time:    22:47:28

Pat Name:     DENNISE PATE              Department:   

Patient ID:   [id number]           Room:          

Gender:       F                        Technician:   

:          [id number]               Requested By: CITLALI KIRAN

Order Number: [id number]            Reading MD:     

                                 Measurements

Intervals                              Axis          

Rate:         123                      P:            37

IN:           170                      QRS:          84

QRSD:         70                       T:            -44

QT:           262                                    

QTc:          380                                    

                           Interpretive Statements

SINUS TACHYCARDIA

QRS(T) CONTOUR ABNORMALITY

CONSISTENT WITH ANTEROSEPTAL INFARCT

AGE UNDETERMINED

T ABNORMALITY IN INFERIOR LEADS

RI6.01          Unconfirmed report

No previous ECG available for comparison

## 2017-06-30 NOTE — RAD
Portable AP upright view CXR:



Clinical indications: Fever and tachycardia



Comparison: March 28, 2017..



Findings: No acute lung infiltrate or pleural effusion or pulmonary edema or

lung mass or pneumothorax is seen.  The heart size, pulmonary vasculature,

mediastinum and both kim are unremarkable. Scoliosis is seen. Left subclavian

Port-A-Cath is in place and unchanged in position. A PEG tube is in place.



Impression: No acute radiographic abnormality is seen.

## 2017-06-30 NOTE — PDOC1
History and Physicial


Street and physical on [facility]





Date of admission 6/29/17


REASON FOR ADMISSION: FEVER, RIGHT HIP PAIN, UTI, SEPSIS, BACTEREMIA








HPI: This is a 21-year-old female presented to the emergency room complaining 

of fever, continued right hip pain.  She has a history of "common variable 

immune deficiency"and PIDD.  Yesterday she had a temperature of 102. She also 

had a temp of 99.8. Other than the hip pain she has had some vomiting. She 

receives her medical care at [facility] as well as is under the 

infectious disease care of Dr. Bowman.


PMI: She has a history of sepsis in March 2017 with bacteremia also Escherichia 

coli urinary tract infection. She has a history of traumatic brain injury, 

lower extremity neuropathy and motor dysfunction, common variable immune 

deficiency, narcotic induced constipation, Carlin's disease, neurogenic bladder

, B12 deficiency, adrenal insufficiency.





Allergies:





 erythromycin base








Medications were reviewed and are available on the MAR











Review of systems positive for right-sided hip pain without history of injury. 

Positive for fever positive for weakness.








Physical exam:


Constitutional 21-year-old in no acute distress. Height 66.5 inches weight 101 

pound. Radial appearing. Just received pain medication as is and is in no acute 

distress.


HEENT eyes were clear,, throat clear, posterior pharynx unremarkable


Neck was supple without adenopathy


Cardiovascular regular rhythm and rate without murmur


Lungs are clear to auscultation


Abdomen soft nontender bowel sounds are positive the only area of concern is 

she has a deep right inguinal pain maneuvering of the leg does not produce 

increase in the right inguinal pain. Inguinal ligament is not necessarily is 

not tender.


Skin is warm and dry


Extremities are without edema neurologic he is alert and oriented 3 and there 

is dysfunction of the lower extremities











Laboratory: Patient has positive urine culture from [facility] from 30 June growing out Escherichia coli greater than 100,000. She has positive blood 

culture of gram-positive cocci in the hip ultrasound and CT of the abdomen and 

pelvis were negative. Laboratory she has an elevated white count.





Assessment #1 sepsis with bacteremia and urinary tract infection growing out 

Escherichia coli


#2 right inguinal pain question etiology


#3 neurogenic bladder number for adrenal insufficiency


#5 lower extremity weakness and neuropathy #6 TBI


7 history of bacteremia and UTI in March 2017


8 immune deficiency


#9 Carlin"s DZ














PLAN:  IV antibiotics, broad-spectrum, IV fluids, along the lines of the severe 

sepsis protocol. Treat her pain. Dose her with stress. Stress dose of 

hydrocortisone.


Problems:  


(1) Sepsis


(2) Immune deficiency disorder


(3) Fever


(4) SIRS (systemic inflammatory response syndrome)











LIZ OVALLES DO Jun 30, 2017 16:47

## 2017-06-30 NOTE — ED.ADGEN
Past History


Past Medical History:  Asthma, DVT, GERD, Other


Additional Past Medical Histor:  history of TBI, history of autoimmune disorder.


Past Surgical History:  Tonsillectomy, Other


Smoking:  Non-smoker


Alcohol Use:  None


Drug Use:  None





Adult General


HPI


HPI





Patient is a 21-year-old woman, history of traumatic brain injury, history of 

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 

who presents to the emergency department with a complaint of fever, right hip 

pain, and generalized malaise. Patient is been evaluated for pain in the right 

hip over the past week. [place], at that time she was noted to have a urine concerning for 

infection, and hip which per her mother's report, had concern for possible 

infection. They did set up for an MRI of the hip, but it was delayed due to the 

patient's neural implant. Patient was seen in the emergency department at 

[facility] earlier today, at that time she was given a dose of 

Levaquin for a urinary tract infection per patient and family report. At that 

time the patient was not experiencing any fever, and her labs were normal, 

therefore she was discharged home with plan to follow-up with a [facility] for 

continued evaluation of her hip pain. They state that she was not discharged 

with antibiotics to be taken orally. Patient has since developed a fever, with 

a temperature of 100.2 at home, was given ibuprofen shortly before coming to 

the ED, currently is febrile with temperature 100.8 in the emergency part, 

tachycardic with a heart rate in the 120s, blood pressure of 90s over 60s, 

complaining of generalized malaise and right hip pain. She denies any abdominal 

pain, states that she's had 2 episodes of vomiting, and nausea today. Denies 

any focal weakness, numbness, tingling, any injuries, any headache, any 

respiratory or HEENT complaints. Patient states she has been compliant with all 

medications.





Review of Systems


Review of Systems





Constitutional: Fever, chills, generalized malaise.


Eyes: Denies change in visual acuity, redness, or eye pain []


HENT: Denies nasal congestion or sore throat []


Respiratory: Denies cough or shortness of breath []


Cardiovascular: No additional information not addressed in HPI []


GI: Denies abdominal pain, bloody stools or diarrhea. Complaining of nausea and 

vomiting.]


: Denies dysuria or hematuria []


Musculoskeletal: Soreness and lower back, and pain in the right hip.


Integument: Denies rash or skin lesions []


Neurologic: Denies headache, focal weakness or sensory changes []


Endocrine: Denies polyuria or polydipsia []





Current Medications


Current Medications





Current Medications








 Medications


  (Trade)  Dose


 Ordered  Sig/Floyd  Start Time


 Stop Time Status Last Admin


Dose Admin


 


 Acetaminophen


  (Tylenol)  1,000 mg  1X  ONCE  17 00:30


 17 00:31 UNV 17 00:29


1,000 MG


 


 Fentanyl Citrate


  (Fentanyl 2ml


 Vial)  25 mcg  PRN Q15MIN  PRN  17 23:15


 17 23:14  17 23:25


25 MCG


 


 Hydrocortisone


 Sodium Succinate


  (Solu-CORTEF)  100 mg  1X  ONCE  17 00:45


 17 00:46 UNV  


 


 


 Piperacillin Sod/


 Tazobactam Sod


  (Zosyn Per


 Pharmacy)  1 each  PRN DAILY  PRN  17 00:45


   UNV  


 


 


 Promethazine HCl


  (Phenergan)  25 mg  STK-MED ONCE  17 23:15


 17 23:16 DC  


 


 


 Promethazine HCl


 25 mg/Sodium


 Chloride  51 ml @ 


 101 mls/hr  1X  ONCE  17 23:15


 17 23:45 DC 17 23:25


101 MLS/HR


 


 Sodium Chloride  1,000 ml @ 


 1,000 mls/hr  1X  ONCE  17 00:45


 17 01:44 UNV  


 


 


 Vancomycin HCl


  (Vanco Per


 Pharmacy)  1 each  PRN DAILY  PRN  17 00:45


   UNV  


 











Allergies


Allergies





Allergies








Coded Allergies Type Severity Reaction Last Updated Verified


 


  erythromycin base Allergy Intermediate  16 Yes


 


  I S O L A T I O N *CONTACT* Allergy Unknown  16 Yes











Physical Exam


Physical Exam





Constitutional: Well developed, well nourished, no acute distress, non-toxic 

appearance. []


HENT: Normocephalic, atraumatic, bilateral external ears normal, oropharynx 

moist, no oral exudates, nose normal. []


Eyes: PERRLA, EOMI, conjunctiva normal, no discharge. [] 


Neck: Normal range of motion, no tenderness, supple, no stridor. [] 


Cardiovascular:Heart rate regular rhythm, no murmur []


Lungs & Thorax:  Bilateral breath sounds clear to auscultation []


Abdomen: Bowel sounds normal, soft, no tenderness, no masses, no pulsatile 

masses. [] 


Skin: Warm, dry, no erythema, no rash. [] 


Back: No tenderness, no CVA tenderness. [] 


Extremities: No tenderness, no cyanosis, no clubbing, ROM intact, no edema. [] 


Neurologic: Alert and oriented X 3, normal motor function, normal sensory 

function, no focal deficits noted. []


Psychologic: Affect normal, judgement normal, mood normal. []





Current Patient Data


Vital Signs





 Vital Signs








  Date Time  Temp Pulse Resp B/P (MAP) Pulse Ox O2 Delivery O2 Flow Rate FiO2


 


17 23:25   20  100 Room Air  








Lab Results





 Laboratory Tests








Test


  17


22:30 17


23:00


 


Urine Opiates Screen Pos (NEG)   


 


Urine Methadone Screen Neg (NEG)   


 


Urine Barbiturates Neg (NEG)   


 


Urine Phencyclidine Screen Neg (NEG)   


 


Urine


Amphetamine/Methamphetamine Neg (NEG)  


  


 


 


Urine Benzodiazepines Screen Neg (NEG)   


 


Urine Cocaine Screen Neg (NEG)   


 


Urine Cannabinoids Screen Neg (NEG)   


 


Urine Ethyl Alcohol Neg (NEG)   


 


White Blood Count


  


  12.3 x10^3/uL


(4.0-11.0)  #H


 


Red Blood Count


  


  4.08 x10^6/uL


(3.50-5.40)


 


Hemoglobin


  


  12.3 g/dL


(12.0-15.5)


 


Hematocrit


  


  36.5 %


(36.0-47.0)


 


Mean Corpuscular Volume


  


  89 fL ()


 


 


Mean Corpuscular Hemoglobin  30 pg (25-35)  


 


Mean Corpuscular Hemoglobin


Concent 


  34 g/dL


(31-37)


 


Red Cell Distribution Width


  


  12.5 %


(11.5-14.5)


 


Platelet Count


  


  159 x10^3/uL


(140-400)


 


Neutrophils (%) (Auto)  96 % (31-73)  H


 


Lymphocytes (%) (Auto)  2 % (24-48)  L


 


Monocytes (%) (Auto)  2 % (0-9)  


 


Eosinophils (%) (Auto)  0 % (0-3)  


 


Basophils (%) (Auto)  0 % (0-3)  


 


Neutrophils # (Auto)


  


  11.8 x10^3uL


(1.8-7.7)  H


 


Lymphocytes # (Auto)


  


  0.3 x10^3/uL


(1.0-4.8)  L


 


Monocytes # (Auto)


  


  0.2 x10^3/uL


(0.0-1.1)


 


Eosinophils # (Auto)


  


  0.0 x10^3/uL


(0.0-0.7)


 


Basophils # (Auto)


  


  0.0 x10^3/uL


(0.0-0.2)


 


Urine Collection Type  U cath  


 


Urine Color  Yellow  


 


Urine Clarity  Clear  


 


Urine pH  7.0  


 


Urine Specific Gravity  1.015  


 


Urine Protein


  


  30 mg/dl


(NEG-TRACE)


 


Urine Glucose (UA)


  


  Neg mg/dL


(NEG)


 


Urine Ketones (Stick)


  


  80 mg/dL (NEG)


 


 


Urine Blood  Neg (NEG)  


 


Urine Nitrite  Neg (NEG)  


 


Urine Bilirubin  Neg (NEG)  


 


Urine Urobilinogen Dipstick


  


  1 mg/dL (0.2


mg/dL)


 


Urine Leukocyte Esterase  Neg (NEG)  


 


Urine RBC  0 /HPF (0-2)  


 


Urine WBC


  


  Occ /HPF (0-4)


 


 


Urine Squamous Epithelial


Cells 


  Few /LPF  


 


 


Urine Bacteria


  


  0 /HPF (0-FEW)


 


 


Sodium Level


  


  136 mmol/L


(136-145)


 


Potassium Level


  


  3.6 mmol/L


(3.5-5.1)


 


Chloride Level


  


  101 mmol/L


()


 


Carbon Dioxide Level


  


  22 mmol/L


(21-32)


 


Anion Gap  13 (6-14)  


 


Blood Urea Nitrogen


  


  5 mg/dL (7-20)


L


 


Creatinine


  


  0.5 mg/dL


(0.6-1.0)  L


 


Estimated GFR


(Cockcroft-Gault) 


  155.7  


 


 


BUN/Creatinine Ratio  10 (6-20)  


 


Glucose Level


  


  97 mg/dL


(70-99)


 


Lactic Acid Level


  


  0.9 mmol/L


(0.4-2.0)


 


Calcium Level


  


  8.1 mg/dL


(8.5-10.1)  L


 


Total Bilirubin


  


  0.6 mg/dL


(0.2-1.0)


 


Aspartate Amino Transferase


(AST) 


  11 U/L (15-37)


L


 


Alanine Aminotransferase (ALT)


  


  12 U/L (14-59)


L


 


Alkaline Phosphatase


  


  57 U/L


()


 


Total Protein


  


  7.4 g/dL


(6.4-8.2)


 


Albumin


  


  3.6 g/dL


(3.4-5.0)


 


Albumin/Globulin Ratio


  


  0.9 (1.0-1.7)


L











EKG


EKG


EC: Sinus tachycardia, heart rate 123 beats minute, QTC of 380, SC of 170

, QRS of 70, patient with contour abnormality noted in the inferior and 

anterioseptal leads, but no ST depressions or elevations, abnormal ECG, but 

does not meet STEMI criteria. As interpreted by me. []





Radiology/Procedures


Radiology/Procedures


Chest x-ray: One view: Normal cardiopulmonary silhouette, no infiltrates, no 

effusions, no pneumothorax, no soft tissue or bony abnormalities identified. As 

interpreted by me. []





Course & Med Decision Making


Course & Med Decision Making


Pertinent Labs and Imaging studies reviewed. (See chart for details)





[]





Final Impression


Final Impression


Patient with presentation concerning for sepsis, laboratory studies, including 

urine, and blood cultures were obtained. Urinalysis did not reveal any anterior 

other concerning findings, however patient has received antibiotics today per 

her report, which may cloud the issue somewhat. Patient does have a 

leukocytosis of 12.3, concerning in light of her medical history, with fever, 

and relative hypotension. Lactic at 0.9. Patient received IV fluid bolus 2, 

Tylenol the emergency department, on reevaluation heart rate is 100, 

temperature is 99.2, blood pressure is 93/46, she is resting more comfortably 

after receiving pain medication and antiemetics as well. I had lengthy 

discussion at bedside with patient and mother, at this time we have not 

identified a source for the infection, with the patient's history and 

presenting illness, she is agreeable for admission to the hospital for broad-

spectrum antibiotics and additional monitoring and evaluation. She states she 

is feeling better at this time after receiving antiemetics and pain medication 

as stated. I did discuss findings as above with Dr. Thomas, who is familiar 

with the patient from previous admissions, patient accepted his service as a 

full admission to the ICU, will start broad-spectrum antibiotics with Zosyn and 

vancomycin per pharmacy, patient also administered stress dose steroids, with 

100 mg of hydrocortisone given IV 1, will continue IV fluids, repeat 

laboratory studies in the morning, and close monitoring. Bridge orders entered 

per discussion.


Problems:  





Dragon Disclaimer


Dragon Disclaimer


This electronic medical record was generated, in whole or in part, using a 

voice recognition dictation system.





Departure:


Impression:  


 Primary Impression:  


 Fever


 Additional Impression:  


 SIRS (systemic inflammatory response syndrome)


Disposition:  09 ADMITTED AS INPATIENT


Admitting Physician:  Juan Thomas


Condition:  IMPROVED











CITLALI KIRAN DO 2017 02:14

## 2017-06-30 NOTE — [facility]
Admission Criteria Forms





SEPSIS and OTHER FEBRILE ILLNESS, W/O FOCAL INFECTION





Clinical Indications for Admission to Inpatient Care 


                        


                                                                              (

Place 'X' for any and all applicable criteria):





Admission is indicated for ANY ONE of the following (1)(2)(3)(4):


[ ] I.       Bacteremia 


[ ]II.       Suspected or identified specific infection requiring 

hospitalization (eg, meningitis, endocarditis) 


[ ]III.      Hemodynamic instability 


[ ]IV.     Altered mental status 


[X]V.      Failure or unavailability of outpatient antimicrobial treatment  


[ ]VI.     Hypoxemia


[ ]VII.    Seizures


[ ]VIII.   High-risk febrile neutropenia


[ ]IX.     Need for parenteral antibiotic in patient who is likely to abuse 

vascular access device 


           (eg, injection drug user)  [A](7)


[ ]X.     Temperature greater than 104.9 degrees F (40.5 degrees C) (oral)


[ ]XI.     Inpatient admission required rather than observation care because of 

ANY ONE of the following:


           [ ]1)    Specific infection identified that is too severe for 

outpatient treatment or 


                     observation care trial


           [ ]2)    Metabolic disorder (eg, hypoglycemia, hyperglycemia, 

metabolic acidosis) that is 


                     severe or persistent


           [ ]3)    Temperature greater than 103.1 degrees F (39.5 degrees C) (

oral) that is not 


                     responsive to observation care treatment   


           [ ]4)    IV fluid to replace significant ongoing (eg, for over 24 

hours) losses (> 3 L/m2 per day)


           [ ]5)    Supplemental oxygen or respiratory treatments for over 24 

hours that is performable 


                     only in acute inpatient setting


           [ ]6)    Parenteral nutrition regimen need that must be implemented 

on inpatient basis


           [ ]7)    Strict or protective (eg, laminar flow) isolation


           [ ]8)    Other condition, treatment or monitoring requiring 

inpatient admission  











Extended stay beyond goal length of stay may be needed for(1)(3)


[ ]a)     Sepsis or septic shock(22)


[ ]b)     Positive blood cultures


[ ]c)     Insufficient oral intake


[ ]d)     High-risk febrile neutropenia(29)(30)


[ ]e)     Continued fever and clinical instability


[ ]f)      Clinically active comorbid illness (e.g,heart failure, renal failure

, diabetes)   


 








The original [employer] content created by [employer] has been revised. 


The portions of the content which have been revised are identified through the 

use of italic text or in bold, and [employer] 


has neither reviewed nor approved the modified material. All other unmodified 

content is copyright  [facility].





Please see references footnoted in the original [facility] edition 

2016


Admission Criteria Met?:  Yes











ISAK JONES. Jun 30, 2017 10:22

## 2017-07-01 VITALS — SYSTOLIC BLOOD PRESSURE: 124 MMHG | DIASTOLIC BLOOD PRESSURE: 79 MMHG

## 2017-07-01 VITALS — DIASTOLIC BLOOD PRESSURE: 81 MMHG | SYSTOLIC BLOOD PRESSURE: 122 MMHG

## 2017-07-01 VITALS — DIASTOLIC BLOOD PRESSURE: 76 MMHG | SYSTOLIC BLOOD PRESSURE: 117 MMHG

## 2017-07-01 VITALS — SYSTOLIC BLOOD PRESSURE: 128 MMHG | DIASTOLIC BLOOD PRESSURE: 87 MMHG

## 2017-07-01 VITALS — SYSTOLIC BLOOD PRESSURE: 116 MMHG | DIASTOLIC BLOOD PRESSURE: 82 MMHG

## 2017-07-01 LAB
ANION GAP SERPL CALC-SCNC: 8 MMOL/L (ref 6–14)
BASOPHILS # BLD AUTO: 0 X10^3/UL (ref 0–0.2)
BASOPHILS NFR BLD: 0 % (ref 0–3)
CA-I SERPL ISE-MCNC: 2 MG/DL (ref 7–20)
CALCIUM SERPL-MCNC: 7.6 MG/DL (ref 8.5–10.1)
CHLORIDE SERPL-SCNC: 109 MMOL/L (ref 98–107)
CO2 SERPL-SCNC: 24 MMOL/L (ref 21–32)
CREAT SERPL-MCNC: 0.5 MG/DL (ref 0.6–1)
EOSINOPHIL NFR BLD: 0 % (ref 0–3)
EOSINOPHIL NFR BLD: 0 X10^3/UL (ref 0–0.7)
ERYTHROCYTE [DISTWIDTH] IN BLOOD BY AUTOMATED COUNT: 13 % (ref 11.5–14.5)
GFR SERPLBLD BASED ON 1.73 SQ M-ARVRAT: 155.7 ML/MIN
GLUCOSE SERPL-MCNC: 140 MG/DL (ref 70–99)
HCT VFR BLD CALC: 29.6 % (ref 36–47)
HGB BLD-MCNC: 10 G/DL (ref 12–15.5)
LYMPHOCYTES # BLD: 0.4 X10^3/UL (ref 1–4.8)
LYMPHOCYTES NFR BLD AUTO: 11 % (ref 24–48)
MCH RBC QN AUTO: 31 PG (ref 25–35)
MCHC RBC AUTO-ENTMCNC: 34 G/DL (ref 31–37)
MCV RBC AUTO: 90 FL (ref 79–100)
MONO #: 0.1 X10^3/UL (ref 0–1.1)
MONOCYTES NFR BLD: 3 % (ref 0–9)
NEUT #: 2.8 X10^3UL (ref 1.8–7.7)
NEUTROPHILS NFR BLD AUTO: 85 % (ref 31–73)
PLATELET # BLD AUTO: 130 X10^3/UL (ref 140–400)
POTASSIUM SERPL-SCNC: 3.3 MMOL/L (ref 3.5–5.1)
RBC # BLD AUTO: 3.28 X10^6/UL (ref 3.5–5.4)
SODIUM SERPL-SCNC: 141 MMOL/L (ref 136–145)
VANC TR: 6.5 MCG/ML (ref 10–20)
WBC # BLD AUTO: 3.3 X10^3/UL (ref 4–11)

## 2017-07-01 RX ADMIN — HYDROCORTISONE SODIUM SUCCINATE SCH MG: 100 INJECTION, POWDER, FOR SOLUTION INTRAMUSCULAR; INTRAVENOUS at 14:09

## 2017-07-01 RX ADMIN — FENTANYL CITRATE PRN MCG: 50 INJECTION, SOLUTION INTRAMUSCULAR; INTRAVENOUS at 10:18

## 2017-07-01 RX ADMIN — PANTOPRAZOLE SODIUM SCH MG: 40 TABLET, DELAYED RELEASE ORAL at 12:16

## 2017-07-01 RX ADMIN — PIPERACILLIN SODIUM AND TAZOBACTAM SODIUM SCH MLS/HR: 3; .375 INJECTION, POWDER, LYOPHILIZED, FOR SOLUTION INTRAVENOUS at 18:00

## 2017-07-01 RX ADMIN — VANCOMYCIN HYDROCHLORIDE PRN EACH: 1 INJECTION, POWDER, LYOPHILIZED, FOR SOLUTION INTRAVENOUS at 14:36

## 2017-07-01 RX ADMIN — HYDROCORTISONE SODIUM SUCCINATE SCH MG: 100 INJECTION, POWDER, FOR SOLUTION INTRAMUSCULAR; INTRAVENOUS at 06:39

## 2017-07-01 RX ADMIN — ALBUTEROL SULFATE SCH MG: 108 AEROSOL, METERED RESPIRATORY (INHALATION) at 06:03

## 2017-07-01 RX ADMIN — SODIUM CHLORIDE SCH MLS/HR: 0.9 INJECTION, SOLUTION INTRAVENOUS at 16:35

## 2017-07-01 RX ADMIN — VANCOMYCIN HYDROCHLORIDE SCH MLS/HR: 750 INJECTION, POWDER, LYOPHILIZED, FOR SOLUTION INTRAVENOUS at 04:53

## 2017-07-01 RX ADMIN — ENOXAPARIN SODIUM SCH MG: 100 INJECTION SUBCUTANEOUS at 10:47

## 2017-07-01 RX ADMIN — METOPROLOL TARTRATE SCH MG: 25 TABLET, FILM COATED ORAL at 08:51

## 2017-07-01 RX ADMIN — PIPERACILLIN SODIUM AND TAZOBACTAM SODIUM SCH MLS/HR: 3; .375 INJECTION, POWDER, LYOPHILIZED, FOR SOLUTION INTRAVENOUS at 06:39

## 2017-07-01 RX ADMIN — ALBUTEROL SULFATE SCH MG: 108 AEROSOL, METERED RESPIRATORY (INHALATION) at 20:29

## 2017-07-01 RX ADMIN — ALBUTEROL SULFATE SCH MG: 108 AEROSOL, METERED RESPIRATORY (INHALATION) at 16:54

## 2017-07-01 RX ADMIN — FENTANYL CITRATE PRN MCG: 50 INJECTION, SOLUTION INTRAMUSCULAR; INTRAVENOUS at 19:36

## 2017-07-01 RX ADMIN — VANCOMYCIN HYDROCHLORIDE PRN EACH: 1 INJECTION, POWDER, LYOPHILIZED, FOR SOLUTION INTRAVENOUS at 10:35

## 2017-07-01 RX ADMIN — FLUDROCORTISONE ACETATE SCH MG: 0.1 TABLET ORAL at 12:16

## 2017-07-01 RX ADMIN — BUDESONIDE SCH MG: 0.5 SUSPENSION RESPIRATORY (INHALATION) at 11:52

## 2017-07-01 RX ADMIN — FENTANYL CITRATE PRN MCG: 50 INJECTION, SOLUTION INTRAMUSCULAR; INTRAVENOUS at 14:01

## 2017-07-01 RX ADMIN — VANCOMYCIN HYDROCHLORIDE SCH MLS/HR: 1 INJECTION, POWDER, LYOPHILIZED, FOR SOLUTION INTRAVENOUS at 20:42

## 2017-07-01 RX ADMIN — HYDROCORTISONE SODIUM SUCCINATE SCH MG: 100 INJECTION, POWDER, FOR SOLUTION INTRAMUSCULAR; INTRAVENOUS at 22:34

## 2017-07-01 RX ADMIN — BUDESONIDE SCH MG: 0.5 SUSPENSION RESPIRATORY (INHALATION) at 20:29

## 2017-07-01 RX ADMIN — METOPROLOL TARTRATE SCH MG: 25 TABLET, FILM COATED ORAL at 20:36

## 2017-07-01 RX ADMIN — ALBUTEROL SULFATE SCH MG: 108 AEROSOL, METERED RESPIRATORY (INHALATION) at 11:52

## 2017-07-01 RX ADMIN — PIPERACILLIN SODIUM AND TAZOBACTAM SODIUM SCH MLS/HR: 3; .375 INJECTION, POWDER, LYOPHILIZED, FOR SOLUTION INTRAVENOUS at 00:09

## 2017-07-01 RX ADMIN — FENTANYL CITRATE PRN MCG: 50 INJECTION, SOLUTION INTRAMUSCULAR; INTRAVENOUS at 04:13

## 2017-07-01 RX ADMIN — FENTANYL CITRATE PRN MCG: 50 INJECTION, SOLUTION INTRAMUSCULAR; INTRAVENOUS at 07:28

## 2017-07-01 RX ADMIN — FENTANYL CITRATE PRN MCG: 50 INJECTION, SOLUTION INTRAMUSCULAR; INTRAVENOUS at 00:15

## 2017-07-01 RX ADMIN — PROMETHAZINE HYDROCHLORIDE PRN MLS/HR: 25 INJECTION INTRAMUSCULAR; INTRAVENOUS at 12:49

## 2017-07-01 RX ADMIN — PIPERACILLIN SODIUM AND TAZOBACTAM SODIUM SCH MLS/HR: 3; .375 INJECTION, POWDER, LYOPHILIZED, FOR SOLUTION INTRAVENOUS at 12:16

## 2017-07-01 NOTE — PDOC
PROGRESS NOTES


Diagnosis


Problem


Problems


Medical Problems:


(1) Fever


Status: Acute  





(2) SIRS (systemic inflammatory response syndrome)


Status: Acute  








Assessment


Problems


Medical Problems:


(1) Fever


Status: Acute  





(2) SIRS (systemic inflammatory response syndrome)


Status: Acute  





(3)Sepsis with bacteremia-2 positive blood cultures growing out gm positive 

cocci in chains





(4) E. Coli UTI





(5) groin pain with normal studies


(6) Immune deficiency


(7) TBI 


(8) Lower extremity neuropathy


(9) adrenal insuffifiency


(10) port-o-cath status


(11) normochromic normocytic anemia


(12) mild hypokalemia


PLAN


Called the lab, the final ID is not back from the blood cultures.  I discussed 

her case with her ID physician Dr. Bowman.  Will continue on the current 

antibiotics until final cultures are back. DVT prophylaxis and receiving stress 

dose of hydrocortisone. Replace potassium.


Subjective


Had a reasonable night. Complaining of some neck pain and still complaining of 

the deep right groin pain. Mother states that she wanted to the leg table, 

table rock [place] and she had been to being aware she was sitting in some kind of 

apparatus and her brother was holding onto her. This certainly could explain 

pain in her groin. We're awaiting the results of her blood cultures as far as 

the final ID goes. She has a U Escherichia coli UTI. Taking in food and fluids 

adequately. Receiving vancomycin and Zosyn.


Objective


L 21-year-old in no acute distress.


HEENT her eyes were clear nose was patent her throat was clear.


Lungs were clear to auscultation


cardiovascular regular rhythm without murmur 


abdomen soft nontender to palpation, no masses palpated


Memories without edema


Noted on her right [place] 

He is around circular like nummular eczema barely above the skin. Slight site 

is free of any evidence of infection





Vital Signs








  Date Time  Temp Pulse Resp B/P (MAP) Pulse Ox O2 Delivery O2 Flow Rate FiO2


 


7/1/17 10:50     97 Room Air  


 


7/1/17 10:05 98.4 57 20 116/82 (93)    














Intake and Output 


 


 7/1/17





 07:00


 


Intake Total 2397.5 ml


 


Output Total 2200 ml


 


Balance 197.5 ml


 


 


 


Intake Oral 460 ml


 


IV Total 1937.5 ml


 


Output Urine Total 2200 ml








Physical Exam


Also neck was palpated and just mild tenderness no nuchal rigidity whatsoever 

right groin could not produce the deep groin pain


Review of Relevant


I have reviewed the following items [place] (where applicable) has been applied. 

Blood cultures, urine culture, C, CMP


Labs





Laboratory Tests








Test


  6/29/17


22:30 6/29/17


23:00 6/30/17


05:00 7/1/17


05:00


 


Urine Opiates Screen Pos (NEG)    


 


Urine Methadone Screen Neg (NEG)    


 


Urine Barbiturates Neg (NEG)    


 


Urine Phencyclidine Screen Neg (NEG)    


 


Urine


Amphetamine/Methamphetamine Neg (NEG) 


  


  


  


 


 


Urine Benzodiazepines Screen Neg (NEG)    


 


Urine Cocaine Screen Neg (NEG)    


 


Urine Cannabinoids Screen Neg (NEG)    


 


Urine Ethyl Alcohol Neg (NEG)    


 


White Blood Count


  


  12.3 x10^3/uL


(4.0-11.0) 


  3.3 x10^3/uL


(4.0-11.0)


 


Red Blood Count


  


  4.08 x10^6/uL


(3.50-5.40) 


  3.28 x10^6/uL


(3.50-5.40)


 


Hemoglobin


  


  12.3 g/dL


(12.0-15.5) 


  10.0 g/dL


(12.0-15.5)


 


Hematocrit


  


  36.5 %


(36.0-47.0) 


  29.6 %


(36.0-47.0)


 


Mean Corpuscular Volume  89 fL ()   90 fL () 


 


Mean Corpuscular Hemoglobin  30 pg (25-35)   31 pg (25-35) 


 


Mean Corpuscular Hemoglobin


Concent 


  34 g/dL


(31-37) 


  34 g/dL


(31-37)


 


Red Cell Distribution Width


  


  12.5 %


(11.5-14.5) 


  13.0 %


(11.5-14.5)


 


Platelet Count


  


  159 x10^3/uL


(140-400) 


  130 x10^3/uL


(140-400)


 


Neutrophils (%) (Auto)  96 % (31-73)   85 % (31-73) 


 


Lymphocytes (%) (Auto)  2 % (24-48)   11 % (24-48) 


 


Monocytes (%) (Auto)  2 % (0-9)   3 % (0-9) 


 


Eosinophils (%) (Auto)  0 % (0-3)   0 % (0-3) 


 


Basophils (%) (Auto)  0 % (0-3)   0 % (0-3) 


 


Neutrophils # (Auto)


  


  11.8 x10^3uL


(1.8-7.7) 


  2.8 x10^3uL


(1.8-7.7)


 


Lymphocytes # (Auto)


  


  0.3 x10^3/uL


(1.0-4.8) 


  0.4 x10^3/uL


(1.0-4.8)


 


Monocytes # (Auto)


  


  0.2 x10^3/uL


(0.0-1.1) 


  0.1 x10^3/uL


(0.0-1.1)


 


Eosinophils # (Auto)


  


  0.0 x10^3/uL


(0.0-0.7) 


  0.0 x10^3/uL


(0.0-0.7)


 


Basophils # (Auto)


  


  0.0 x10^3/uL


(0.0-0.2) 


  0.0 x10^3/uL


(0.0-0.2)


 


Urine Collection Type  U cath   


 


Urine Color  Yellow   


 


Urine Clarity  Clear   


 


Urine pH  7.0   


 


Urine Specific Gravity  1.015   


 


Urine Protein


  


  30 mg/dl


(NEG-TRACE) 


  


 


 


Urine Glucose (UA)


  


  Neg mg/dL


(NEG) 


  


 


 


Urine Ketones (Stick)  80 mg/dL (NEG)   


 


Urine Blood  Neg (NEG)   


 


Urine Nitrite  Neg (NEG)   


 


Urine Bilirubin  Neg (NEG)   


 


Urine Urobilinogen Dipstick


  


  1 mg/dL (0.2


mg/dL) 


  


 


 


Urine Leukocyte Esterase  Neg (NEG)   


 


Urine RBC  0 /HPF (0-2)   


 


Urine WBC  Occ /HPF (0-4)   


 


Urine Squamous Epithelial


Cells 


  Few /LPF 


  


  


 


 


Urine Bacteria  0 /HPF (0-FEW)   


 


Sodium Level


  


  136 mmol/L


(136-145) 


  141 mmol/L


(136-145)


 


Potassium Level


  


  3.6 mmol/L


(3.5-5.1) 


  3.3 mmol/L


(3.5-5.1)


 


Chloride Level


  


  101 mmol/L


() 


  109 mmol/L


()


 


Carbon Dioxide Level


  


  22 mmol/L


(21-32) 


  24 mmol/L


(21-32)


 


Anion Gap  13 (6-14)   8 (6-14) 


 


Blood Urea Nitrogen  5 mg/dL (7-20)   2 mg/dL (7-20) 


 


Creatinine


  


  0.5 mg/dL


(0.6-1.0) 


  0.5 mg/dL


(0.6-1.0)


 


Estimated GFR


(Cockcroft-Gault) 


  155.7 


  


  155.7 


 


 


BUN/Creatinine Ratio  10 (6-20)   


 


Glucose Level


  


  97 mg/dL


(70-99) 


  140 mg/dL


(70-99)


 


Lactic Acid Level


  


  0.9 mmol/L


(0.4-2.0) 


  


 


 


Calcium Level


  


  8.1 mg/dL


(8.5-10.1) 


  7.6 mg/dL


(8.5-10.1)


 


Total Bilirubin


  


  0.6 mg/dL


(0.2-1.0) 


  


 


 


Aspartate Amino Transf


(AST/SGOT) 


  11 U/L (15-37) 


  


  


 


 


Alanine Aminotransferase


(ALT/SGPT) 


  12 U/L (14-59) 


  


  


 


 


Alkaline Phosphatase


  


  57 U/L


() 


  


 


 


Total Protein


  


  7.4 g/dL


(6.4-8.2) 


  


 


 


Albumin


  


  3.6 g/dL


(3.4-5.0) 


  


 


 


Albumin/Globulin Ratio  0.9 (1.0-1.7)   


 


Nasal Screen MRSA (PCR)


  


  


  Positive


(Negative) 


 








Microbiology


6/29/17 Blood Culture - Preliminary, Resulted


          NO GROWTH AFTER 1 DAY


Medications





Current Medications


Sodium Chloride 1,000 ml @  1,000 mls/hr 1X  ONCE IV  Last administered on 6/29/ 17at 23:25;  Start 6/29/17 at 23:00;  Stop 6/29/17 at 23:59;  Status DC


Acetaminophen (Tylenol) 1,000 mg 1X  ONCE PO ;  Start 6/29/17 at 23:15;  Stop 6/ 30/17 at 00:22;  Status DC


Promethazine HCl 25 mg/Sodium Chloride 51 ml @  101 mls/hr 1X  ONCE IV  Last 

administered on 6/29/17at 23:25;  Start 6/29/17 at 23:15;  Stop 6/29/17 at 23:45

;  Status DC


Fentanyl Citrate (Fentanyl 2ml Vial) 25 mcg PRN Q15MIN  PRN IV PAIN GREATER 

THAN 3/10 Last administered on 6/29/17at 23:25;  Start 6/29/17 at 23:15;  Stop 6 /30/17 at 10:48;  Status DC


Promethazine HCl (Phenergan) 25 mg STK-MED ONCE IV ;  Start 6/29/17 at 23:15;  

Stop 6/29/17 at 23:16;  Status DC


Sodium Chloride 50 ml @ As Directed STK-MED ONCE .ROUTE ;  Start 6/29/17 at 23:

16;  Stop 6/29/17 at 23:17;  Status DC


Acetaminophen (Tylenol) 1,000 mg 1X  ONCE PO  Last administered on 6/30/17at 00:

29;  Start 6/30/17 at 00:30;  Stop 6/30/17 at 02:41;  Status DC


Sodium Chloride 1,000 ml @  1,000 mls/hr 1X  ONCE IV  Last administered on 6/30/ 17at 02:30;  Start 6/30/17 at 00:45;  Stop 6/30/17 at 02:41;  Status DC


Vancomycin HCl (Vanco Per Pharmacy) 1 each PRN DAILY  PRN MC SEE COMMENTS Last 

administered on 7/1/17at 10:35;  Start 6/30/17 at 00:45


Piperacillin Sod/ Tazobactam Sod (Zosyn Per Pharmacy) 1 each PRN DAILY  PRN MC 

SEE COMMENTS Last administered on 7/1/17at 10:34;  Start 6/30/17 at 04:00


Hydrocortisone Sodium Succinate (Solu-CORTEF) 100 mg 1X  ONCE IV  Last 

administered on 6/30/17at 03:00;  Start 6/30/17 at 02:45;  Stop 6/30/17 at 02:46

;  Status DC


Ondansetron HCl (Zofran) 4 mg PRN Q4HRS  PRN IV NAUSEA/VOMITING;  Start 6/30/17 

at 02:30;  Stop 7/1/17 at 02:29;  Status DC


Fentanyl Citrate (Fentanyl 2ml Vial) 50 mcg PRN Q2HR  PRN IV PAIN Last 

administered on 7/1/17at 00:15;  Start 6/30/17 at 02:30;  Stop 7/1/17 at 02:29;

  Status DC


Sodium Chloride 1,000 ml @  150 mls/hr Q6H40M IV  Last administered on 6/30/ 17at 22:42;  Start 6/30/17 at 02:30;  Stop 7/1/17 at 02:29;  Status DC


Acetaminophen (Tylenol) 650 mg PRN Q4HRS  PRN PO FEVER Last administered on 6/30 /17at 22:42;  Start 6/30/17 at 02:30;  Stop 7/1/17 at 02:29;  Status DC


Vancomycin HCl 1 gm/Sodium Chloride 250 ml @  250 mls/hr ONCE  ONCE IV ;  Start 

6/30/17 at 03:00;  Stop 6/30/17 at 03:59;  Status DC


Piperacillin Sod/ Tazobactam Sod 3.375 gm/Sodium Chloride 50 ml @  100 mls/hr 

1X  ONCE IV  Last administered on 6/30/17at 05:58;  Start 6/30/17 at 04:00;  

Stop 6/30/17 at 04:29;  Status DC


Diphenhydramine HCl (Benadryl) 25 mg 1X  ONCE IVP  Last administered on 6/30/ 17at 05:57;  Start 6/30/17 at 04:45;  Stop 6/30/17 at 04:46;  Status DC


Piperacillin Sod/ Tazobactam Sod 3.375 gm/Sodium Chloride 50 ml @  100 mls/hr 

Q6HRS IV  Last administered on 7/1/17at 06:39;  Start 6/30/17 at 12:00


Vancomycin HCl 1 gm STK-MED ONCE .ROUTE  Last administered on 6/30/17at 06:37;  

Start 6/30/17 at 05:32;  Stop 6/30/17 at 05:33;  Status DC


Piperacillin Sod/ Tazobactam Sod (Zosyn) 3.375 gm STK-MED ONCE IV ;  Start 6/30/ 17 at 05:32;  Stop 6/30/17 at 05:33;  Status DC


Sodium Chloride 250 ml @  As Directed STK-MED ONCE .ROUTE  Last administered on 

6/30/17at 06:37;  Start 6/30/17 at 05:32;  Stop 6/30/17 at 05:33;  Status DC


Sodium Chloride 50 ml @ As Directed STK-MED ONCE .ROUTE ;  Start 6/30/17 at 05:

32;  Stop 6/30/17 at 05:33;  Status DC


Vancomycin HCl 750 mg/Sodium Chloride 250 ml @  250 mls/hr Q8H IV  Last 

administered on 7/1/17at 04:53;  Start 6/30/17 at 13:00


Vancomycin HCl 1 each 1X  ONCE MC ;  Start 7/1/17 at 12:30;  Stop 7/1/17 at 12:

31


Albuterol Sulfate (Ventolin) 2.5 mg PRN Q4HRS  PRN NEB SHORTNESS OF BREATH;  

Start 6/30/17 at 10:30


Albuterol Sulfate (Ventolin Hfa) 2 puff PRN Q4HRS  PRN IH SHORTNESS OF BREATH;  

Start 6/30/17 at 10:30;  Status UNV


Fludrocortisone Acetate (Florinef) 0.1 mg NOON PO  Last administered on 6/30/ 17at 11:56;  Start 6/30/17 at 12:00


Acetaminophen/ Hydrocodone Bitart (Lortab 5/325) 1 tab PRN Q6HRS  PRN PO PAIN;  

Start 6/30/17 at 10:30


Metoclopramide HCl (Reglan) 10 mg PRN TID  PRN PO NAUSEA/VOMITING;  Start 6/30/ 17 at 10:30


Metoprolol Tartrate (Lopressor) 25 mg BID PO  Last administered on 7/1/17at 08:

51;  Start 6/30/17 at 10:30


Promethazine HCl (Phenergan) 25 mg PRN Q4HRS  PRN PO NAUSEA/VOMITING;  Start 6/ 30/17 at 10:30


Non-Formulary Medication 2 puff BID IH ;  Start 6/30/17 at 21:00;  Stop 6/30/17 

at 21:00;  Status DC


Pantoprazole Sodium (Protonix) 40 mg NOON PO  Last administered on 6/30/17at 11:

56;  Start 6/30/17 at 12:00


Hydrocortisone (Cortef) 5 mg BID PO  Last administered on 6/30/17at 11:16;  

Start 6/30/17 at 11:00;  Stop 6/30/17 at 11:39;  Status DC


Non-Formulary Medication 290 mcg PRN DAILY  PRN PO CONSTIPATION;  Start 6/30/17 

at 10:30;  Status UNV


Ondansetron HCl (Zofran Odt) 8 mg PRN Q4HRS  PRN PO NAUSEA/VOMITING;  Start 6/30 /17 at 11:00


Enoxaparin Sodium (Lovenox) 30 mg Q24H SQ  Last administered on 7/1/17at 10:47;

  Start 6/30/17 at 10:30


Albuterol Sulfate (Ventolin) 2.5 mg Q6H NEB  Last administered on 7/1/17at 06:03

;  Start 6/30/17 at 11:00


Budesonide (Pulmicort) 0.5 mg RTBID NEB  Last administered on 6/30/17at 20:51;  

Start 6/30/17 at 20:00


Promethazine HCl 12.5 mg/Sodium Chloride 50.5 ml @  101 mls/hr PRN Q6HRS  PRN 

IV NAUSEA/VOMITING Last administered on 6/30/17at 22:54;  Start 6/30/17 at 11:15


Hydrocortisone Sodium Succinate (Solu-CORTEF) 100 mg Q8HRS IV  Last 

administered on 7/1/17at 06:39;  Start 6/30/17 at 12:00


Diphenhydramine HCl (Benadryl) 25 mg PRN Q6HRS  PRN IVP ITCHING Last 

administered on 7/1/17at 05:04;  Start 6/30/17 at 14:00


Promethazine HCl (Phenergan) 25 mg STK-MED ONCE IV ;  Start 6/30/17 at 22:47;  

Stop 6/30/17 at 22:48;  Status DC


Sodium Chloride 50 ml @ As Directed STK-MED ONCE .ROUTE ;  Start 6/30/17 at 22:

48;  Stop 6/30/17 at 22:49;  Status DC


Fentanyl Citrate (Fentanyl 2ml Vial) 50 mcg PRN Q2HR  PRN IV PAIN Last 

administered on 7/1/17at 10:18;  Start 7/1/17 at 04:00





Active Scripts


Active


Reported


Albuterol Sulfate Neb Soln  ** (Albuterol Sulfate) 2.5 Mg/3 Ml Vial.neb 2.5 Mg 

NEB PRN Q4HRS PRN


Ventolin Hfa Inhaler (Albuterol Sulfate) 18 Gm Hfa.aer.ad 2 Puff IH PRN Q4HRS 

PRN


Symbicort 80-4.5 Mcg Inhaler (Budesonide/Formoterol Fumarate) 10.2 Gm 

Hfa.aer.ad 2 Puff IH BID


[place] 5-325 Tablet (Hydrocodone Bit/Acetaminophen) 1 Each Tablet 1-2 Tab PO Q4-

6HRS


Reglan (Metoclopramide Hcl) 10 Mg Tablet 10 Mg PO PRN TID PRN


Promethazine Hcl 25 Mg Tablet 25 Mg PO PRN Q4HRS PRN


Zofran Odt (Ondansetron) 8 Mg Tab.rapdis 8 Mg PO PRN Q4HRS PRN


Metoprolol Tartrate 25 Mg Tablet 25 Mg PO BID


Nexium Capsule (Esomeprazole Magnesium) 40 Mg Capsule.dr 40 Mg PO NOON


Fludrocortisone Acetate 0.1 Mg Tablet 0.1 Mg PO NOON


Cortef (Hydrocortisone) 5 Mg Tablet 5 Mg PO BID


Linzess (Linaclotide) 290 Mcg Capsule 290 Mcg PO PRN DAILY PRN


Vitals/I & O





Vital Sign - Last 24 Hours








 6/30/17 6/30/17 6/30/17 6/30/17





 12:06 13:40 13:49 14:49


 


Pulse 75 78 69 66


 


Resp 21 21 22 20


 


B/P (MAP) 95/52 (66) 97/61 (73) 105/54 (71) 98/58 (71)


 


Pulse Ox 99 100 100 99


 


O2 Delivery Room Air Room Air Room Air Room Air





 6/30/17 6/30/17 6/30/17 6/30/17





 15:49 19:17 20:30 20:40


 


Temp  98.7  


 


Pulse 62 69  69


 


Resp 14 18  


 


B/P (MAP) 100/54 (69) 106/64 (78)  106/64


 


Pulse Ox 98 99  


 


O2 Delivery Room Air Room Air Room Air 


 


    





 6/30/17 6/30/17 6/30/17 6/30/17





 20:55 20:58 21:07 22:38


 


Temp    99.0


 


Pulse    100


 


Resp   18 20


 


B/P (MAP)    99/67 (78)


 


Pulse Ox 97   100


 


O2 Delivery Room Air Room Air Room Air Room Air


 


    





 7/1/17 7/1/17 7/1/17 7/1/17





 00:15 00:45 04:13 04:45


 


Resp 18 18 18 18


 


O2 Delivery Room Air Room Air Room Air 





 7/1/17 7/1/17 7/1/17 7/1/17





 05:02 06:05 07:28 08:00


 


Temp 98.4   


 


Pulse 65   


 


Resp 18   


 


B/P (MAP) 117/76 (90)   


 


Pulse Ox 99 96 96 


 


O2 Delivery Room Air Room Air Room Air Room Air


 


    





 7/1/17 7/1/17 7/1/17 7/1/17





 08:51 10:05 10:18 10:50


 


Temp  98.4  


 


Pulse 61 57  


 


Resp  20  


 


B/P (MAP) 116/76 116/82 (93)  


 


Pulse Ox  97 97 97


 


O2 Delivery  Room Air Room Air Room Air














Intake and Output   


 


 6/30/17 6/30/17 7/1/17





 15:00 23:00 07:00


 


Intake Total 450.5 ml 1487 ml 460 ml


 


Output Total  625 ml 1575 ml


 


Balance 450.5 ml 862 ml -1115 ml

















LIZ OVALLES DO Jul 1, 2017 12:10

## 2017-07-02 VITALS — DIASTOLIC BLOOD PRESSURE: 90 MMHG | SYSTOLIC BLOOD PRESSURE: 143 MMHG

## 2017-07-02 VITALS — SYSTOLIC BLOOD PRESSURE: 156 MMHG | DIASTOLIC BLOOD PRESSURE: 104 MMHG

## 2017-07-02 VITALS — DIASTOLIC BLOOD PRESSURE: 90 MMHG | SYSTOLIC BLOOD PRESSURE: 142 MMHG

## 2017-07-02 VITALS — DIASTOLIC BLOOD PRESSURE: 79 MMHG | SYSTOLIC BLOOD PRESSURE: 123 MMHG

## 2017-07-02 VITALS — SYSTOLIC BLOOD PRESSURE: 117 MMHG | DIASTOLIC BLOOD PRESSURE: 77 MMHG

## 2017-07-02 LAB
ALBUMIN SERPL-MCNC: 2.8 G/DL (ref 3.4–5)
ALBUMIN/GLOB SERPL: 0.8 {RATIO} (ref 1–1.7)
ALP SERPL-CCNC: 53 U/L (ref 46–116)
ALT SERPL-CCNC: 58 U/L (ref 14–59)
ANION GAP SERPL CALC-SCNC: 7 MMOL/L (ref 6–14)
AST SERPL-CCNC: 29 U/L (ref 15–37)
BILIRUB SERPL-MCNC: 0.2 MG/DL (ref 0.2–1)
BUN/CREAT SERPL: 4 (ref 6–20)
CA-I SERPL ISE-MCNC: 2 MG/DL (ref 7–20)
CALCIUM SERPL-MCNC: 7.7 MG/DL (ref 8.5–10.1)
CHLORIDE SERPL-SCNC: 110 MMOL/L (ref 98–107)
CO2 SERPL-SCNC: 27 MMOL/L (ref 21–32)
CREAT SERPL-MCNC: 0.5 MG/DL (ref 0.6–1)
ERYTHROCYTE [DISTWIDTH] IN BLOOD BY AUTOMATED COUNT: 12.8 % (ref 11.5–14.5)
GFR SERPLBLD BASED ON 1.73 SQ M-ARVRAT: 155.7 ML/MIN
GLOBULIN SER-MCNC: 3.3 G/DL (ref 2.2–3.8)
GLUCOSE SERPL-MCNC: 113 MG/DL (ref 70–99)
HCT VFR BLD CALC: 32.4 % (ref 36–47)
HGB BLD-MCNC: 10.8 G/DL (ref 12–15.5)
MAGNESIUM SERPL-MCNC: 1.7 MG/DL (ref 1.8–2.4)
MCH RBC QN AUTO: 30 PG (ref 25–35)
MCHC RBC AUTO-ENTMCNC: 33 G/DL (ref 31–37)
MCV RBC AUTO: 90 FL (ref 79–100)
PLATELET # BLD AUTO: 186 X10^3/UL (ref 140–400)
POTASSIUM SERPL-SCNC: 3 MMOL/L (ref 3.5–5.1)
PROT SERPL-MCNC: 6.1 G/DL (ref 6.4–8.2)
RBC # BLD AUTO: 3.59 X10^6/UL (ref 3.5–5.4)
SODIUM SERPL-SCNC: 144 MMOL/L (ref 136–145)
WBC # BLD AUTO: 5.4 X10^3/UL (ref 4–11)

## 2017-07-02 RX ADMIN — ALBUTEROL SULFATE SCH MG: 108 AEROSOL, METERED RESPIRATORY (INHALATION) at 16:42

## 2017-07-02 RX ADMIN — METOPROLOL TARTRATE SCH MG: 25 TABLET, FILM COATED ORAL at 07:56

## 2017-07-02 RX ADMIN — ALBUTEROL SULFATE SCH MG: 108 AEROSOL, METERED RESPIRATORY (INHALATION) at 20:36

## 2017-07-02 RX ADMIN — HYDROCORTISONE SODIUM SUCCINATE SCH MG: 100 INJECTION, POWDER, FOR SOLUTION INTRAMUSCULAR; INTRAVENOUS at 14:45

## 2017-07-02 RX ADMIN — SODIUM CHLORIDE SCH MLS/HR: 0.9 INJECTION, SOLUTION INTRAVENOUS at 02:00

## 2017-07-02 RX ADMIN — POTASSIUM CHLORIDE SCH MLS/HR: 2 INJECTION, SOLUTION, CONCENTRATE INTRAVENOUS at 20:20

## 2017-07-02 RX ADMIN — DEXTROSE SCH MLS/HR: 50 INJECTION, SOLUTION INTRAVENOUS at 10:12

## 2017-07-02 RX ADMIN — PIPERACILLIN SODIUM AND TAZOBACTAM SODIUM SCH MLS/HR: 3; .375 INJECTION, POWDER, LYOPHILIZED, FOR SOLUTION INTRAVENOUS at 06:27

## 2017-07-02 RX ADMIN — PIPERACILLIN SODIUM AND TAZOBACTAM SODIUM SCH MLS/HR: 3; .375 INJECTION, POWDER, LYOPHILIZED, FOR SOLUTION INTRAVENOUS at 00:00

## 2017-07-02 RX ADMIN — FENTANYL CITRATE PRN MCG: 50 INJECTION, SOLUTION INTRAMUSCULAR; INTRAVENOUS at 09:09

## 2017-07-02 RX ADMIN — FLUDROCORTISONE ACETATE SCH MG: 0.1 TABLET ORAL at 11:55

## 2017-07-02 RX ADMIN — POTASSIUM CHLORIDE SCH MEQ: 1500 TABLET, EXTENDED RELEASE ORAL at 11:56

## 2017-07-02 RX ADMIN — HYDROCORTISONE SODIUM SUCCINATE SCH MG: 100 INJECTION, POWDER, FOR SOLUTION INTRAMUSCULAR; INTRAVENOUS at 22:02

## 2017-07-02 RX ADMIN — FENTANYL CITRATE PRN MCG: 50 INJECTION, SOLUTION INTRAMUSCULAR; INTRAVENOUS at 05:31

## 2017-07-02 RX ADMIN — POTASSIUM CHLORIDE SCH MEQ: 1500 TABLET, EXTENDED RELEASE ORAL at 17:58

## 2017-07-02 RX ADMIN — PANTOPRAZOLE SODIUM SCH MG: 40 TABLET, DELAYED RELEASE ORAL at 11:55

## 2017-07-02 RX ADMIN — POTASSIUM CHLORIDE SCH MEQ: 1500 TABLET, EXTENDED RELEASE ORAL at 07:57

## 2017-07-02 RX ADMIN — HYDROCORTISONE SODIUM SUCCINATE SCH MG: 100 INJECTION, POWDER, FOR SOLUTION INTRAMUSCULAR; INTRAVENOUS at 06:27

## 2017-07-02 RX ADMIN — POTASSIUM CHLORIDE SCH MLS/HR: 2 INJECTION, SOLUTION, CONCENTRATE INTRAVENOUS at 07:58

## 2017-07-02 RX ADMIN — BUDESONIDE SCH MG: 0.5 SUSPENSION RESPIRATORY (INHALATION) at 11:25

## 2017-07-02 RX ADMIN — PROMETHAZINE HYDROCHLORIDE PRN MLS/HR: 25 INJECTION INTRAMUSCULAR; INTRAVENOUS at 16:58

## 2017-07-02 RX ADMIN — ENOXAPARIN SODIUM SCH MG: 100 INJECTION SUBCUTANEOUS at 10:13

## 2017-07-02 RX ADMIN — FENTANYL CITRATE PRN MCG: 50 INJECTION, SOLUTION INTRAMUSCULAR; INTRAVENOUS at 12:27

## 2017-07-02 RX ADMIN — ALBUTEROL SULFATE SCH MG: 108 AEROSOL, METERED RESPIRATORY (INHALATION) at 05:59

## 2017-07-02 RX ADMIN — BUDESONIDE SCH MG: 0.5 SUSPENSION RESPIRATORY (INHALATION) at 20:36

## 2017-07-02 RX ADMIN — METOPROLOL TARTRATE SCH MG: 25 TABLET, FILM COATED ORAL at 20:14

## 2017-07-02 RX ADMIN — FENTANYL CITRATE PRN MCG: 50 INJECTION, SOLUTION INTRAMUSCULAR; INTRAVENOUS at 16:36

## 2017-07-02 RX ADMIN — FENTANYL CITRATE PRN MCG: 50 INJECTION, SOLUTION INTRAMUSCULAR; INTRAVENOUS at 20:15

## 2017-07-02 RX ADMIN — ALBUTEROL SULFATE SCH MG: 108 AEROSOL, METERED RESPIRATORY (INHALATION) at 11:25

## 2017-07-02 RX ADMIN — PROMETHAZINE HYDROCHLORIDE PRN MG: 25 TABLET ORAL at 20:15

## 2017-07-02 RX ADMIN — FENTANYL CITRATE PRN MCG: 50 INJECTION, SOLUTION INTRAMUSCULAR; INTRAVENOUS at 00:22

## 2017-07-02 RX ADMIN — VANCOMYCIN HYDROCHLORIDE SCH MLS/HR: 1 INJECTION, POWDER, LYOPHILIZED, FOR SOLUTION INTRAVENOUS at 04:45

## 2017-07-02 NOTE — PDOC
PROGRESS NOTES


Diagnosis


Problem


Problems


Medical Problems:


(1) Fever


Status: Acute  





(2) SIRS (systemic inflammatory response syndrome)


#3 sepsis with bacteremia growing out strep viridans


For sepsis with fungemia growing out yeast 


5 right groin pain


#6 immune deficiency assessment pain management a Port-A-Cath status


#7 mild hypokalemia 


#8 traumatic brain injury


#9 Carr catheter


10 normochromic normocytic anemia


11 DVT prophylaxis


























Status: Acute  





Assessment


Problems


Medical Problems:


(1) Fever


Status: Acute  





(2) SIRS (systemic inflammatory response syndrome)


Status: Acute  








PLAN


The plan is for her to start on the micafungin and she has already received a 

dose. She will continue on Rocephin. At some point next week she will have to 

most likely be transferred to [place] to have the Port-A-Cath removed. And 

some other type of IV access done. For now she remains in isolation under the 

care of her mother the nursing staff and is actually doing quite well. She is 

being medicated for pain. And we'll give her Benadryl as needed


Subjective


21-year-old female admitted for positive cultures. 2 blood cultures have grown 

out strep viridans. Sensitivities are still pending. 2 more sets of blood 

cultures have grown out [place]. These of the blood cultures from [place]. I 

discussed her case with Dr. Lane and she will will start on antifungal IV today 

as well as downgrading the other antibiotics and will be just on Rocephin. I 

have discussed this with her mother. Became a little itchy Starting the MICHAEL 

FUNGiN did get some relief with Benadryl. She is having excellent urine output. 

Also Dr. Lama said that the Port-A-Cath will have to come out after 2 or 3 

days on the antifungal medication. She will need continued IV access. Do not 

think we will be able to take support out here at [facility] so she possibly 

will need to be transferred to [facility] early next week.


Objective





Vital Signs








  Date Time  Temp Pulse Resp B/P (MAP) Pulse Ox O2 Delivery O2 Flow Rate FiO2


 


7/2/17 13:04     98 Room Air  


 


7/2/17 12:01 98.5 60 18 117/77 (90)    














Intake and Output 


 


 7/2/17





 07:00


 


Intake Total 721 ml


 


Output Total 1000 ml


 


Balance -279 ml


 


 


 


Intake Oral 721 ml


 


Output Urine Total 1000 ml








Physical Exam


Color is pale, tongue is moist, eyes are clear, her neck was supple, her lungs 

were clear, cardiovascular regular rhythm and rate, abdomen soft nontender, 

Carr catheter in place draining large amounts of yellow urine, extremities 

without edema.


Review of Relevant


Blood cultures were reviewed .I have reviewed the following items [place] (where 

applicable) has been applied.


Labs





Laboratory Tests








Test


  7/1/17


05:00 7/1/17


13:00 7/2/17


05:40


 


White Blood Count


  3.3 x10^3/uL


(4.0-11.0) 


  5.4 x10^3/uL


(4.0-11.0)


 


Red Blood Count


  3.28 x10^6/uL


(3.50-5.40) 


  3.59 x10^6/uL


(3.50-5.40)


 


Hemoglobin


  10.0 g/dL


(12.0-15.5) 


  10.8 g/dL


(12.0-15.5)


 


Hematocrit


  29.6 %


(36.0-47.0) 


  32.4 %


(36.0-47.0)


 


Mean Corpuscular Volume 90 fL ()   90 fL () 


 


Mean Corpuscular Hemoglobin 31 pg (25-35)   30 pg (25-35) 


 


Mean Corpuscular Hemoglobin


Concent 34 g/dL


(31-37) 


  33 g/dL


(31-37)


 


Red Cell Distribution Width


  13.0 %


(11.5-14.5) 


  12.8 %


(11.5-14.5)


 


Platelet Count


  130 x10^3/uL


(140-400) 


  186 x10^3/uL


(140-400)


 


Neutrophils (%) (Auto) 85 % (31-73)   


 


Lymphocytes (%) (Auto) 11 % (24-48)   


 


Monocytes (%) (Auto) 3 % (0-9)   


 


Eosinophils (%) (Auto) 0 % (0-3)   


 


Basophils (%) (Auto) 0 % (0-3)   


 


Neutrophils # (Auto)


  2.8 x10^3uL


(1.8-7.7) 


  


 


 


Lymphocytes # (Auto)


  0.4 x10^3/uL


(1.0-4.8) 


  


 


 


Monocytes # (Auto)


  0.1 x10^3/uL


(0.0-1.1) 


  


 


 


Eosinophils # (Auto)


  0.0 x10^3/uL


(0.0-0.7) 


  


 


 


Basophils # (Auto)


  0.0 x10^3/uL


(0.0-0.2) 


  


 


 


Sodium Level


  141 mmol/L


(136-145) 


  144 mmol/L


(136-145)


 


Potassium Level


  3.3 mmol/L


(3.5-5.1) 


  3.0 mmol/L


(3.5-5.1)


 


Chloride Level


  109 mmol/L


() 


  110 mmol/L


()


 


Carbon Dioxide Level


  24 mmol/L


(21-32) 


  27 mmol/L


(21-32)


 


Anion Gap 8 (6-14)   7 (6-14) 


 


Blood Urea Nitrogen 2 mg/dL (7-20)   2 mg/dL (7-20) 


 


Creatinine


  0.5 mg/dL


(0.6-1.0) 


  0.5 mg/dL


(0.6-1.0)


 


Estimated GFR


(Cockcroft-Gault) 155.7 


  


  155.7 


 


 


Glucose Level


  140 mg/dL


(70-99) 


  113 mg/dL


(70-99)


 


Calcium Level


  7.6 mg/dL


(8.5-10.1) 


  7.7 mg/dL


(8.5-10.1)


 


Vancomycin Level Trough


  


  6.5 mcg/mL


(10.0-20.0) 


 


 


Vancomycin Last Dose Date  07/01/2017  


 


Vancomycin Last Dose Time  0500  


 


BUN/Creatinine Ratio   4 (6-20) 


 


Magnesium Level


  


  


  1.7 mg/dL


(1.8-2.4)


 


Total Bilirubin


  


  


  0.2 mg/dL


(0.2-1.0)


 


Aspartate Amino Transf


(AST/SGOT) 


  


  29 U/L (15-37) 


 


 


Alanine Aminotransferase


(ALT/SGPT) 


  


  58 U/L (14-59) 


 


 


Alkaline Phosphatase


  


  


  53 U/L


()


 


Total Protein


  


  


  6.1 g/dL


(6.4-8.2)


 


Albumin


  


  


  2.8 g/dL


(3.4-5.0)


 


Albumin/Globulin Ratio   0.8 (1.0-1.7) 








Microbiology


6/29/17 Blood Culture - Preliminary, Resulted


          


6/29/17 Blood Culture Result 1 (BRANDON) - Preliminary, Resulted


Medications





Current Medications


Sodium Chloride 1,000 ml @  1,000 mls/hr 1X  ONCE IV  Last administered on 6/29/ 17at 23:25;  Start 6/29/17 at 23:00;  Stop 6/29/17 at 23:59;  Status DC


Acetaminophen (Tylenol) 1,000 mg 1X  ONCE PO ;  Start 6/29/17 at 23:15;  Stop 6/ 30/17 at 00:22;  Status DC


Promethazine HCl 25 mg/Sodium Chloride 51 ml @  101 mls/hr 1X  ONCE IV  Last 

administered on 6/29/17at 23:25;  Start 6/29/17 at 23:15;  Stop 6/29/17 at 23:45

;  Status DC


Fentanyl Citrate (Fentanyl 2ml Vial) 25 mcg PRN Q15MIN  PRN IV PAIN GREATER 

THAN 3/10 Last administered on 6/29/17at 23:25;  Start 6/29/17 at 23:15;  Stop 6 /30/17 at 10:48;  Status DC


Promethazine HCl (Phenergan) 25 mg STK-MED ONCE IV ;  Start 6/29/17 at 23:15;  

Stop 6/29/17 at 23:16;  Status DC


Sodium Chloride 50 ml @ As Directed STK-MED ONCE .ROUTE ;  Start 6/29/17 at 23:

16;  Stop 6/29/17 at 23:17;  Status DC


Acetaminophen (Tylenol) 1,000 mg 1X  ONCE PO  Last administered on 6/30/17at 00:

29;  Start 6/30/17 at 00:30;  Stop 6/30/17 at 02:41;  Status DC


Sodium Chloride 1,000 ml @  1,000 mls/hr 1X  ONCE IV  Last administered on 6/30/ 17at 02:30;  Start 6/30/17 at 00:45;  Stop 6/30/17 at 02:41;  Status DC


Vancomycin HCl (Vanco Per Pharmacy) 1 each PRN DAILY  PRN MC SEE COMMENTS Last 

administered on 7/1/17at 14:36;  Start 6/30/17 at 00:45;  Stop 7/2/17 at 10:00;

  Status DC


Piperacillin Sod/ Tazobactam Sod (Zosyn Per Pharmacy) 1 each PRN DAILY  PRN MC 

SEE COMMENTS Last administered on 7/1/17at 10:34;  Start 6/30/17 at 04:00;  

Stop 7/2/17 at 10:00;  Status DC


Hydrocortisone Sodium Succinate (Solu-CORTEF) 100 mg 1X  ONCE IV  Last 

administered on 6/30/17at 03:00;  Start 6/30/17 at 02:45;  Stop 6/30/17 at 02:46

;  Status DC


Ondansetron HCl (Zofran) 4 mg PRN Q4HRS  PRN IV NAUSEA/VOMITING;  Start 6/30/17 

at 02:30;  Stop 7/1/17 at 02:29;  Status DC


Fentanyl Citrate (Fentanyl 2ml Vial) 50 mcg PRN Q2HR  PRN IV PAIN Last 

administered on 7/1/17at 00:15;  Start 6/30/17 at 02:30;  Stop 7/1/17 at 02:29;

  Status DC


Sodium Chloride 1,000 ml @  150 mls/hr Q6H40M IV  Last administered on 6/30/ 17at 22:42;  Start 6/30/17 at 02:30;  Stop 7/1/17 at 02:29;  Status DC


Acetaminophen (Tylenol) 650 mg PRN Q4HRS  PRN PO FEVER Last administered on 6/30 /17at 22:42;  Start 6/30/17 at 02:30;  Stop 7/1/17 at 02:29;  Status DC


Vancomycin HCl 1 gm/Sodium Chloride 250 ml @  250 mls/hr ONCE  ONCE IV ;  Start 

6/30/17 at 03:00;  Stop 6/30/17 at 03:59;  Status DC


Piperacillin Sod/ Tazobactam Sod 3.375 gm/Sodium Chloride 50 ml @  100 mls/hr 

1X  ONCE IV  Last administered on 6/30/17at 05:58;  Start 6/30/17 at 04:00;  

Stop 6/30/17 at 04:29;  Status DC


Diphenhydramine HCl (Benadryl) 25 mg 1X  ONCE IVP  Last administered on 6/30/ 17at 05:57;  Start 6/30/17 at 04:45;  Stop 6/30/17 at 04:46;  Status DC


Piperacillin Sod/ Tazobactam Sod 3.375 gm/Sodium Chloride 50 ml @  100 mls/hr 

Q6HRS IV  Last administered on 7/2/17at 06:27;  Start 6/30/17 at 12:00;  Stop 7/ 2/17 at 09:32;  Status DC


Vancomycin HCl 1 gm STK-MED ONCE .ROUTE  Last administered on 6/30/17at 06:37;  

Start 6/30/17 at 05:32;  Stop 6/30/17 at 05:33;  Status DC


Piperacillin Sod/ Tazobactam Sod (Zosyn) 3.375 gm STK-MED ONCE IV ;  Start 6/30/ 17 at 05:32;  Stop 6/30/17 at 05:33;  Status DC


Sodium Chloride 250 ml @  As Directed STK-MED ONCE .ROUTE  Last administered on 

6/30/17at 06:37;  Start 6/30/17 at 05:32;  Stop 6/30/17 at 05:33;  Status DC


Sodium Chloride 50 ml @ As Directed STK-MED ONCE .ROUTE ;  Start 6/30/17 at 05:

32;  Stop 6/30/17 at 05:33;  Status DC


Vancomycin HCl 750 mg/Sodium Chloride 250 ml @  250 mls/hr Q8H IV  Last 

administered on 7/1/17at 04:53;  Start 6/30/17 at 13:00;  Stop 7/1/17 at 14:08;

  Status DC


Vancomycin HCl 1 each 1X  ONCE MC  Last administered on 7/1/17at 12:30;  Start 7 /1/17 at 12:30;  Stop 7/1/17 at 12:31;  Status DC


Albuterol Sulfate (Ventolin) 2.5 mg PRN Q4HRS  PRN NEB SHORTNESS OF BREATH;  

Start 6/30/17 at 10:30


Albuterol Sulfate (Ventolin Hfa) 2 puff PRN Q4HRS  PRN IH SHORTNESS OF BREATH;  

Start 6/30/17 at 10:30;  Status UNV


Fludrocortisone Acetate (Florinef) 0.1 mg NOON PO  Last administered on 7/2/ 17at 11:55;  Start 6/30/17 at 12:00


Acetaminophen/ Hydrocodone Bitart (Lortab 5/325) 1 tab PRN Q6HRS  PRN PO PAIN;  

Start 6/30/17 at 10:30


Metoclopramide HCl (Reglan) 10 mg PRN TID  PRN PO NAUSEA/VOMITING;  Start 6/30/ 17 at 10:30


Metoprolol Tartrate (Lopressor) 25 mg BID PO  Last administered on 7/2/17at 07:

56;  Start 6/30/17 at 10:30


Promethazine HCl (Phenergan) 25 mg PRN Q4HRS  PRN PO NAUSEA/VOMITING;  Start 6/ 30/17 at 10:30


Non-Formulary Medication 2 puff BID IH ;  Start 6/30/17 at 21:00;  Stop 6/30/17 

at 21:00;  Status DC


Pantoprazole Sodium (Protonix) 40 mg NOON PO  Last administered on 7/2/17at 11:

55;  Start 6/30/17 at 12:00


Hydrocortisone (Cortef) 5 mg BID PO  Last administered on 6/30/17at 11:16;  

Start 6/30/17 at 11:00;  Stop 6/30/17 at 11:39;  Status DC


Non-Formulary Medication 290 mcg PRN DAILY  PRN PO CONSTIPATION;  Start 6/30/17 

at 10:30;  Status UNV


Ondansetron HCl (Zofran Odt) 8 mg PRN Q4HRS  PRN PO NAUSEA/VOMITING;  Start 6/30 /17 at 11:00


Enoxaparin Sodium (Lovenox) 30 mg Q24H SQ  Last administered on 7/2/17at 10:13;

  Start 6/30/17 at 10:30


Albuterol Sulfate (Ventolin) 2.5 mg Q6H NEB  Last administered on 7/2/17at 11:25

;  Start 6/30/17 at 11:00


Budesonide (Pulmicort) 0.5 mg RTBID NEB  Last administered on 7/2/17at 11:25;  

Start 6/30/17 at 20:00


Promethazine HCl 12.5 mg/Sodium Chloride 50.5 ml @  101 mls/hr PRN Q6HRS  PRN 

IV NAUSEA/VOMITING Last administered on 7/1/17at 12:49;  Start 6/30/17 at 11:15


Hydrocortisone Sodium Succinate (Solu-CORTEF) 100 mg Q8HRS IV  Last 

administered on 7/2/17at 14:45;  Start 6/30/17 at 12:00


Diphenhydramine HCl (Benadryl) 25 mg PRN Q6HRS  PRN IVP ITCHING Last 

administered on 7/2/17at 12:59;  Start 6/30/17 at 14:00


Promethazine HCl (Phenergan) 25 mg STK-MED ONCE IV ;  Start 6/30/17 at 22:47;  

Stop 6/30/17 at 22:48;  Status DC


Sodium Chloride 50 ml @ As Directed STK-MED ONCE .ROUTE ;  Start 6/30/17 at 22:

48;  Stop 6/30/17 at 22:49;  Status DC


Fentanyl Citrate (Fentanyl 2ml Vial) 50 mcg PRN Q2HR  PRN IV PAIN Last 

administered on 7/2/17at 12:27;  Start 7/1/17 at 04:00


Potassium Chloride (Klor-Con) 20 meq 1X  ONCE PO  Last administered on 7/1/17at 

12:47;  Start 7/1/17 at 12:30;  Stop 7/1/17 at 12:31;  Status DC


Sodium Chloride 250 ml @  As Directed STK-MED ONCE .ROUTE  Last administered on 

7/1/17at 12:47;  Start 7/1/17 at 12:24;  Stop 7/1/17 at 12:25;  Status DC


Sodium Chloride 1,000 ml @  100 mls/hr Q10H IV  Last administered on 7/1/17at 16

:35;  Start 7/1/17 at 16:00;  Stop 7/2/17 at 06:14;  Status DC


Vancomycin HCl 1 gm/Sodium Chloride 250 ml @  250 mls/hr Q8H IV  Last 

administered on 7/2/17at 04:45;  Start 7/1/17 at 21:00;  Stop 7/2/17 at 09:32;  

Status DC


Vancomycin HCl 1 each 1X  ONCE MC ;  Start 7/2/17 at 20:30;  Stop 7/2/17 at 20:

31;  Status Cancel


Potassium Chloride (Klor-Con) 20 meq 1X  ONCE PO  Last administered on 7/1/17at 

16:34;  Start 7/1/17 at 17:00;  Stop 7/1/17 at 17:01;  Status DC


Piperacillin Sod/ Tazobactam Sod (Zosyn) 3.375 gm STK-MED ONCE IV  Last 

administered on 7/1/17at 18:37;  Start 7/1/17 at 18:37;  Stop 7/1/17 at 18:38;  

Status DC


Sodium Chloride 50 ml @ As Directed STK-MED ONCE .ROUTE  Last administered on 7/ 1/17at 18:37;  Start 7/1/17 at 18:37;  Stop 7/1/17 at 18:38;  Status DC


Sodium Chloride 50 ml @ As Directed STK-MED ONCE .ROUTE  Last administered on 7/ 1/17at 18:45;  Start 7/1/17 at 18:45;  Stop 7/1/17 at 18:46;  Status DC


Piperacillin Sod/ Tazobactam Sod (Zosyn) 3.375 gm STK-MED ONCE IV  Last 

administered on 7/2/17at 00:22;  Start 7/1/17 at 23:46;  Stop 7/1/17 at 23:47;  

Status DC


Sodium Chloride 50 ml @ As Directed STK-MED ONCE .ROUTE  Last administered on 7/ 1/17at 23:46;  Start 7/1/17 at 23:46;  Stop 7/1/17 at 23:47;  Status DC


Promethazine HCl (Phenergan) 25 mg STK-MED ONCE IV  Last administered on 7/2/ 17at 00:21;  Start 7/1/17 at 23:49;  Stop 7/1/17 at 23:50;  Status DC


Sodium Chloride 50 ml @ As Directed STK-MED ONCE .ROUTE  Last administered on 7/ 1/17at 23:50;  Start 7/1/17 at 23:50;  Stop 7/1/17 at 23:51;  Status DC


Magnesium Sulfate 50 ml @ 25 mls/hr 1X  ONCE IV  Last administered on 7/2/17at 

07:57;  Start 7/2/17 at 06:30;  Stop 7/2/17 at 08:29;  Status DC


Potassium Chloride 40 meq/ Sodium Chloride 1,020 ml @  75 mls/hr [id number] IV  

Last administered on 7/2/17at 07:58;  Start 7/2/17 at 06:30


Potassium Chloride (Klor-Con) 20 meq TIDWMEALS PO  Last administered on 7/2/ 17at 11:56;  Start 7/2/17 at 08:00


Micafungin Sodium 100 mg/Dextrose 100 ml @  100 mls/hr Q24H IV  Last 

administered on 7/2/17at 10:12;  Start 7/2/17 at 10:00


Ceftriaxone Sodium 1 gm/ Sodium Chloride 50 ml @  100 mls/hr Q24H IV ;  Start 7/ 2/17 at 11:00;  Stop 7/2/17 at 11:00;  Status DC


Ceftriaxone Sodium 1 gm/ Sodium Chloride 50 ml @  100 mls/hr Q24H IV ;  Start 7/ 2/17 at 11:00;  Stop 7/2/17 at 11:00;  Status DC


Ceftriaxone Sodium 1 gm/ Sodium Chloride 50 ml @  100 mls/hr Q24H IV  Last 

administered on 7/2/17at 11:56;  Start 7/2/17 at 11:00





Active Scripts


Active


Reported


Albuterol Sulfate Neb Soln  ** (Albuterol Sulfate) 2.5 Mg/3 Ml Vial.neb 2.5 Mg 

NEB PRN Q4HRS PRN


Ventolin Hfa Inhaler (Albuterol Sulfate) 18 Gm Hfa.aer.ad 2 Puff IH PRN Q4HRS 

PRN


Symbicort 80-4.5 Mcg Inhaler (Budesonide/Formoterol Fumarate) 10.2 Gm 

Hfa.aer.ad 2 Puff IH BID


[place] 5-325 Tablet (Hydrocodone Bit/Acetaminophen) 1 Each Tablet 1-2 Tab PO Q4-

6HRS


Reglan (Metoclopramide Hcl) 10 Mg Tablet 10 Mg PO PRN TID PRN


Promethazine Hcl 25 Mg Tablet 25 Mg PO PRN Q4HRS PRN


Zofran Odt (Ondansetron) 8 Mg Tab.rapdis 8 Mg PO PRN Q4HRS PRN


Metoprolol Tartrate 25 Mg Tablet 25 Mg PO BID


Nexium Capsule (Esomeprazole Magnesium) 40 Mg Capsule.dr 40 Mg PO NOON


Fludrocortisone Acetate 0.1 Mg Tablet 0.1 Mg PO NOON


Cortef (Hydrocortisone) 5 Mg Tablet 5 Mg PO BID


Linzess (Linaclotide) 290 Mcg Capsule 290 Mcg PO PRN DAILY PRN


Vitals/I & O





Vital Sign - Last 24 Hours








 7/1/17 7/1/17 7/1/17 7/1/17





 16:55 18:16 19:36 20:00


 


Temp  99.0  


 


Pulse  68  


 


Resp  20 16 


 


B/P (MAP)  124/79 (94)  


 


Pulse Ox 100 98  


 


O2 Delivery Room Air Room Air  Room Air


 


    





 7/1/17 7/1/17 7/1/17 7/1/17





 20:06 20:30 20:35 20:36


 


Pulse    50


 


Resp 16   


 


B/P (MAP)    124/79


 


Pulse Ox  97  


 


O2 Delivery  Room Air Room Air 





 7/1/17 7/2/17 7/2/17 7/2/17





 23:52 00:22 05:31 06:00


 


Temp 99.1   


 


Pulse 50   


 


Resp 18   


 


B/P (MAP) 128/87 (101)   


 


Pulse Ox 99 99 99 99


 


O2 Delivery Room Air Room Air Room Air Room Air


 


    





 7/2/17 7/2/17 7/2/17 7/2/17





 06:04 07:56 08:00 08:12


 


Temp 98.2   


 


Pulse 57 57  66


 


Resp 16   18


 


B/P (MAP) 156/104 (121) 156/104  143/90 (107)


 


Pulse Ox 99   99


 


O2 Delivery Room Air  Room Air Room Air


 


    





 7/2/17 7/2/17 7/2/17 7/2/17





 11:25 11:25 12:01 12:27


 


Temp   98.5 


 


Pulse   60 


 


Resp   18 


 


B/P (MAP)   117/77 (90) 


 


Pulse Ox 98 98 98 98


 


O2 Delivery Room Air Room Air Room Air Room Air


 


    





 7/2/17   





 13:04   


 


Pulse Ox 98   


 


O2 Delivery Room Air   














Intake and Output   


 


 7/1/17 7/1/17 7/2/17





 15:00 23:00 07:00


 


Intake Total 480 ml 241 ml 0 ml


 


Output Total  1000 ml 


 


Balance 480 ml -759 ml 0 ml











Nutrition Consultation


Malnutrition Findings:


Body Fat Depletion (Non Severe:  Mild Depletion











LIZ OVALLES DO Jul 2, 2017 15:21

## 2017-07-03 VITALS — SYSTOLIC BLOOD PRESSURE: 121 MMHG | DIASTOLIC BLOOD PRESSURE: 77 MMHG

## 2017-07-03 VITALS — DIASTOLIC BLOOD PRESSURE: 77 MMHG | SYSTOLIC BLOOD PRESSURE: 123 MMHG

## 2017-07-03 VITALS — DIASTOLIC BLOOD PRESSURE: 88 MMHG | SYSTOLIC BLOOD PRESSURE: 137 MMHG

## 2017-07-03 VITALS — DIASTOLIC BLOOD PRESSURE: 75 MMHG | SYSTOLIC BLOOD PRESSURE: 120 MMHG

## 2017-07-03 VITALS — SYSTOLIC BLOOD PRESSURE: 160 MMHG | DIASTOLIC BLOOD PRESSURE: 74 MMHG

## 2017-07-03 LAB
ALBUMIN SERPL-MCNC: 3 G/DL (ref 3.4–5)
ALBUMIN/GLOB SERPL: 0.8 {RATIO} (ref 1–1.7)
ALP SERPL-CCNC: 51 U/L (ref 46–116)
ALT SERPL-CCNC: 48 U/L (ref 14–59)
ANION GAP SERPL CALC-SCNC: 7 MMOL/L (ref 6–14)
AST SERPL-CCNC: 18 U/L (ref 15–37)
BASOPHILS # BLD AUTO: 0 X10^3/UL (ref 0–0.2)
BASOPHILS NFR BLD: 0 % (ref 0–3)
BILIRUB SERPL-MCNC: 0.2 MG/DL (ref 0.2–1)
BUN/CREAT SERPL: 6 (ref 6–20)
CA-I SERPL ISE-MCNC: 3 MG/DL (ref 7–20)
CALCIUM SERPL-MCNC: 8.2 MG/DL (ref 8.5–10.1)
CHLORIDE SERPL-SCNC: 105 MMOL/L (ref 98–107)
CO2 SERPL-SCNC: 29 MMOL/L (ref 21–32)
CREAT SERPL-MCNC: 0.5 MG/DL (ref 0.6–1)
EOSINOPHIL NFR BLD: 0 % (ref 0–3)
EOSINOPHIL NFR BLD: 0 X10^3/UL (ref 0–0.7)
ERYTHROCYTE [DISTWIDTH] IN BLOOD BY AUTOMATED COUNT: 13 % (ref 11.5–14.5)
GFR SERPLBLD BASED ON 1.73 SQ M-ARVRAT: 155.7 ML/MIN
GLOBULIN SER-MCNC: 3.6 G/DL (ref 2.2–3.8)
GLUCOSE SERPL-MCNC: 93 MG/DL (ref 70–99)
HCT VFR BLD CALC: 33.2 % (ref 36–47)
HGB BLD-MCNC: 11.2 G/DL (ref 12–15.5)
LYMPHOCYTES # BLD: 1.4 X10^3/UL (ref 1–4.8)
LYMPHOCYTES NFR BLD AUTO: 25 % (ref 24–48)
MAGNESIUM SERPL-MCNC: 2 MG/DL (ref 1.8–2.4)
MCH RBC QN AUTO: 30 PG (ref 25–35)
MCHC RBC AUTO-ENTMCNC: 34 G/DL (ref 31–37)
MCV RBC AUTO: 89 FL (ref 79–100)
MONO #: 0.3 X10^3/UL (ref 0–1.1)
MONOCYTES NFR BLD: 6 % (ref 0–9)
NEUT #: 3.9 X10^3UL (ref 1.8–7.7)
NEUTROPHILS NFR BLD AUTO: 69 % (ref 31–73)
PLATELET # BLD AUTO: 183 X10^3/UL (ref 140–400)
POTASSIUM SERPL-SCNC: 3.2 MMOL/L (ref 3.5–5.1)
PROT SERPL-MCNC: 6.6 G/DL (ref 6.4–8.2)
RBC # BLD AUTO: 3.71 X10^6/UL (ref 3.5–5.4)
SODIUM SERPL-SCNC: 141 MMOL/L (ref 136–145)
WBC # BLD AUTO: 5.7 X10^3/UL (ref 4–11)

## 2017-07-03 RX ADMIN — METOPROLOL TARTRATE SCH MG: 25 TABLET, FILM COATED ORAL at 08:01

## 2017-07-03 RX ADMIN — POTASSIUM CHLORIDE SCH MLS/HR: 2 INJECTION, SOLUTION, CONCENTRATE INTRAVENOUS at 21:19

## 2017-07-03 RX ADMIN — HYDROCORTISONE SODIUM SUCCINATE SCH MG: 100 INJECTION, POWDER, FOR SOLUTION INTRAMUSCULAR; INTRAVENOUS at 21:18

## 2017-07-03 RX ADMIN — BUDESONIDE SCH MG: 0.5 SUSPENSION RESPIRATORY (INHALATION) at 11:26

## 2017-07-03 RX ADMIN — DEXTROSE SCH MLS/HR: 50 INJECTION, SOLUTION INTRAVENOUS at 10:13

## 2017-07-03 RX ADMIN — ALBUTEROL SULFATE SCH MG: 108 AEROSOL, METERED RESPIRATORY (INHALATION) at 11:26

## 2017-07-03 RX ADMIN — POTASSIUM CHLORIDE SCH MEQ: 1500 TABLET, EXTENDED RELEASE ORAL at 18:22

## 2017-07-03 RX ADMIN — ALBUTEROL SULFATE SCH MG: 108 AEROSOL, METERED RESPIRATORY (INHALATION) at 05:45

## 2017-07-03 RX ADMIN — FENTANYL CITRATE PRN MCG: 50 INJECTION, SOLUTION INTRAMUSCULAR; INTRAVENOUS at 05:39

## 2017-07-03 RX ADMIN — POTASSIUM CHLORIDE SCH MEQ: 1500 TABLET, EXTENDED RELEASE ORAL at 08:01

## 2017-07-03 RX ADMIN — PROMETHAZINE HYDROCHLORIDE PRN MLS/HR: 25 INJECTION INTRAMUSCULAR; INTRAVENOUS at 08:42

## 2017-07-03 RX ADMIN — METOPROLOL TARTRATE SCH MG: 25 TABLET, FILM COATED ORAL at 21:18

## 2017-07-03 RX ADMIN — PROMETHAZINE HYDROCHLORIDE PRN MLS/HR: 25 INJECTION INTRAMUSCULAR; INTRAVENOUS at 22:44

## 2017-07-03 RX ADMIN — HYDROCORTISONE SODIUM SUCCINATE SCH MG: 100 INJECTION, POWDER, FOR SOLUTION INTRAMUSCULAR; INTRAVENOUS at 05:28

## 2017-07-03 RX ADMIN — POTASSIUM CHLORIDE SCH MLS/HR: 2 INJECTION, SOLUTION, CONCENTRATE INTRAVENOUS at 11:52

## 2017-07-03 RX ADMIN — POTASSIUM CHLORIDE SCH MEQ: 1500 TABLET, EXTENDED RELEASE ORAL at 11:49

## 2017-07-03 RX ADMIN — ENOXAPARIN SODIUM SCH MG: 100 INJECTION SUBCUTANEOUS at 10:13

## 2017-07-03 RX ADMIN — FENTANYL CITRATE PRN MCG: 50 INJECTION, SOLUTION INTRAMUSCULAR; INTRAVENOUS at 08:13

## 2017-07-03 RX ADMIN — ALBUTEROL SULFATE SCH MG: 108 AEROSOL, METERED RESPIRATORY (INHALATION) at 21:29

## 2017-07-03 RX ADMIN — HYDROCORTISONE SODIUM SUCCINATE SCH MG: 100 INJECTION, POWDER, FOR SOLUTION INTRAMUSCULAR; INTRAVENOUS at 14:21

## 2017-07-03 RX ADMIN — FENTANYL CITRATE PRN MCG: 50 INJECTION, SOLUTION INTRAMUSCULAR; INTRAVENOUS at 15:53

## 2017-07-03 RX ADMIN — FENTANYL CITRATE PRN MCG: 50 INJECTION, SOLUTION INTRAMUSCULAR; INTRAVENOUS at 22:44

## 2017-07-03 RX ADMIN — PROMETHAZINE HYDROCHLORIDE PRN MLS/HR: 25 INJECTION INTRAMUSCULAR; INTRAVENOUS at 16:42

## 2017-07-03 RX ADMIN — FENTANYL CITRATE PRN MCG: 50 INJECTION, SOLUTION INTRAMUSCULAR; INTRAVENOUS at 18:23

## 2017-07-03 RX ADMIN — FENTANYL CITRATE PRN MCG: 50 INJECTION, SOLUTION INTRAMUSCULAR; INTRAVENOUS at 10:44

## 2017-07-03 RX ADMIN — BUDESONIDE SCH MG: 0.5 SUSPENSION RESPIRATORY (INHALATION) at 21:29

## 2017-07-03 RX ADMIN — PANTOPRAZOLE SODIUM SCH MG: 40 TABLET, DELAYED RELEASE ORAL at 11:52

## 2017-07-03 RX ADMIN — ALBUTEROL SULFATE SCH MG: 108 AEROSOL, METERED RESPIRATORY (INHALATION) at 16:31

## 2017-07-03 RX ADMIN — FLUDROCORTISONE ACETATE SCH MG: 0.1 TABLET ORAL at 11:48

## 2017-07-03 RX ADMIN — METOCLOPRAMIDE HYDROCHLORIDE PRN MG: 10 TABLET ORAL at 15:53

## 2017-07-03 NOTE — PDOC
PROGRESS NOTES


Diagnosis


ANEMIA:  Other (immune defeciency )


SEPSIS:  Streptococcus, Other (yeast)


SEVERE SEPSIS:  Other (hypokalemia)


Problem


Problems


Medical Problems:


(1) Fever


Status: Acute  





(2) SIRS (systemic inflammatory response syndrome)


Status: Acute  








Assessment


Problems


Medical Problems:


(1) Fever


Status: Acute  





(2) SIRS (systemic inflammatory response syndrome)


Status: Acute  








Problems:  


Objective





Vital Signs








  Date Time  Temp Pulse Resp B/P (MAP) Pulse Ox O2 Delivery O2 Flow Rate FiO2


 


7/3/17 11:27     98 Room Air  


 


7/3/17 11:15   20     


 


7/3/17 10:57 98.5 57  115/74 (88)    














Intake and Output 


 


 7/3/17





 07:00


 


Intake Total 920 ml


 


Output Total 5900 ml


 


Balance -4980 ml


 


 


 


Intake Oral 920 ml


 


Output Urine Total 5900 ml








Review of Relevant


I have reviewed the following items [place] (where applicable) has been applied.


Labs





Laboratory Tests








Test


  7/2/17


05:40 7/3/17


05:35


 


White Blood Count


  5.4 x10^3/uL


(4.0-11.0) 5.7 x10^3/uL


(4.0-11.0)


 


Red Blood Count


  3.59 x10^6/uL


(3.50-5.40) 3.71 x10^6/uL


(3.50-5.40)


 


Hemoglobin


  10.8 g/dL


(12.0-15.5) 11.2 g/dL


(12.0-15.5)


 


Hematocrit


  32.4 %


(36.0-47.0) 33.2 %


(36.0-47.0)


 


Mean Corpuscular Volume 90 fL ()  89 fL () 


 


Mean Corpuscular Hemoglobin 30 pg (25-35)  30 pg (25-35) 


 


Mean Corpuscular Hemoglobin


Concent 33 g/dL


(31-37) 34 g/dL


(31-37)


 


Red Cell Distribution Width


  12.8 %


(11.5-14.5) 13.0 %


(11.5-14.5)


 


Platelet Count


  186 x10^3/uL


(140-400) 183 x10^3/uL


(140-400)


 


Sodium Level


  144 mmol/L


(136-145) 141 mmol/L


(136-145)


 


Potassium Level


  3.0 mmol/L


(3.5-5.1) 3.2 mmol/L


(3.5-5.1)


 


Chloride Level


  110 mmol/L


() 105 mmol/L


()


 


Carbon Dioxide Level


  27 mmol/L


(21-32) 29 mmol/L


(21-32)


 


Anion Gap 7 (6-14)  7 (6-14) 


 


Blood Urea Nitrogen 2 mg/dL (7-20)  3 mg/dL (7-20) 


 


Creatinine


  0.5 mg/dL


(0.6-1.0) 0.5 mg/dL


(0.6-1.0)


 


Estimated GFR


(Cockcroft-Gault) 155.7 


  155.7 


 


 


BUN/Creatinine Ratio 4 (6-20)  6 (6-20) 


 


Glucose Level


  113 mg/dL


(70-99) 93 mg/dL


(70-99)


 


Calcium Level


  7.7 mg/dL


(8.5-10.1) 8.2 mg/dL


(8.5-10.1)


 


Magnesium Level


  1.7 mg/dL


(1.8-2.4) 2.0 mg/dL


(1.8-2.4)


 


Total Bilirubin


  0.2 mg/dL


(0.2-1.0) 0.2 mg/dL


(0.2-1.0)


 


Aspartate Amino Transf


(AST/SGOT) 29 U/L (15-37) 


  18 U/L (15-37) 


 


 


Alanine Aminotransferase


(ALT/SGPT) 58 U/L (14-59) 


  48 U/L (14-59) 


 


 


Alkaline Phosphatase


  53 U/L


() 51 U/L


()


 


Total Protein


  6.1 g/dL


(6.4-8.2) 6.6 g/dL


(6.4-8.2)


 


Albumin


  2.8 g/dL


(3.4-5.0) 3.0 g/dL


(3.4-5.0)


 


Albumin/Globulin Ratio 0.8 (1.0-1.7)  0.8 (1.0-1.7) 


 


Neutrophils (%) (Auto)  69 % (31-73) 


 


Lymphocytes (%) (Auto)  25 % (24-48) 


 


Monocytes (%) (Auto)  6 % (0-9) 


 


Eosinophils (%) (Auto)  0 % (0-3) 


 


Basophils (%) (Auto)  0 % (0-3) 


 


Neutrophils # (Auto)


  


  3.9 x10^3uL


(1.8-7.7)


 


Lymphocytes # (Auto)


  


  1.4 x10^3/uL


(1.0-4.8)


 


Monocytes # (Auto)


  


  0.3 x10^3/uL


(0.0-1.1)


 


Eosinophils # (Auto)


  


  0.0 x10^3/uL


(0.0-0.7)


 


Basophils # (Auto)


  


  0.0 x10^3/uL


(0.0-0.2)








Microbiology


6/29/17 Blood Culture - Preliminary, Resulted


          


6/29/17 Blood Culture Result 1 (BRANDON) - Preliminary, Resulted


Medications





Current Medications


Sodium Chloride 1,000 ml @  1,000 mls/hr 1X  ONCE IV  Last administered on 6/29/ 17at 23:25;  Start 6/29/17 at 23:00;  Stop 6/29/17 at 23:59;  Status DC


Acetaminophen (Tylenol) 1,000 mg 1X  ONCE PO ;  Start 6/29/17 at 23:15;  Stop 6/ 30/17 at 00:22;  Status DC


Promethazine HCl 25 mg/Sodium Chloride 51 ml @  101 mls/hr 1X  ONCE IV  Last 

administered on 6/29/17at 23:25;  Start 6/29/17 at 23:15;  Stop 6/29/17 at 23:45

;  Status DC


Fentanyl Citrate (Fentanyl 2ml Vial) 25 mcg PRN Q15MIN  PRN IV PAIN GREATER 

THAN 3/10 Last administered on 6/29/17at 23:25;  Start 6/29/17 at 23:15;  Stop 6 /30/17 at 10:48;  Status DC


Promethazine HCl (Phenergan) 25 mg STK-MED ONCE IV ;  Start 6/29/17 at 23:15;  

Stop 6/29/17 at 23:16;  Status DC


Sodium Chloride 50 ml @ As Directed STK-MED ONCE .ROUTE ;  Start 6/29/17 at 23:

16;  Stop 6/29/17 at 23:17;  Status DC


Acetaminophen (Tylenol) 1,000 mg 1X  ONCE PO  Last administered on 6/30/17at 00:

29;  Start 6/30/17 at 00:30;  Stop 6/30/17 at 02:41;  Status DC


Sodium Chloride 1,000 ml @  1,000 mls/hr 1X  ONCE IV  Last administered on 6/30/ 17at 02:30;  Start 6/30/17 at 00:45;  Stop 6/30/17 at 02:41;  Status DC


Vancomycin HCl (Vanco Per Pharmacy) 1 each PRN DAILY  PRN MC SEE COMMENTS Last 

administered on 7/1/17at 14:36;  Start 6/30/17 at 00:45;  Stop 7/2/17 at 10:00;

  Status DC


Piperacillin Sod/ Tazobactam Sod (Zosyn Per Pharmacy) 1 each PRN DAILY  PRN MC 

SEE COMMENTS Last administered on 7/1/17at 10:34;  Start 6/30/17 at 04:00;  

Stop 7/2/17 at 10:00;  Status DC


Hydrocortisone Sodium Succinate (Solu-CORTEF) 100 mg 1X  ONCE IV  Last 

administered on 6/30/17at 03:00;  Start 6/30/17 at 02:45;  Stop 6/30/17 at 02:46

;  Status DC


Ondansetron HCl (Zofran) 4 mg PRN Q4HRS  PRN IV NAUSEA/VOMITING;  Start 6/30/17 

at 02:30;  Stop 7/1/17 at 02:29;  Status DC


Fentanyl Citrate (Fentanyl 2ml Vial) 50 mcg PRN Q2HR  PRN IV PAIN Last 

administered on 7/1/17at 00:15;  Start 6/30/17 at 02:30;  Stop 7/1/17 at 02:29;

  Status DC


Sodium Chloride 1,000 ml @  150 mls/hr Q6H40M IV  Last administered on 6/30/ 17at 22:42;  Start 6/30/17 at 02:30;  Stop 7/1/17 at 02:29;  Status DC


Acetaminophen (Tylenol) 650 mg PRN Q4HRS  PRN PO FEVER Last administered on 6/30 /17at 22:42;  Start 6/30/17 at 02:30;  Stop 7/1/17 at 02:29;  Status DC


Vancomycin HCl 1 gm/Sodium Chloride 250 ml @  250 mls/hr ONCE  ONCE IV ;  Start 

6/30/17 at 03:00;  Stop 6/30/17 at 03:59;  Status DC


Piperacillin Sod/ Tazobactam Sod 3.375 gm/Sodium Chloride 50 ml @  100 mls/hr 

1X  ONCE IV  Last administered on 6/30/17at 05:58;  Start 6/30/17 at 04:00;  

Stop 6/30/17 at 04:29;  Status DC


Diphenhydramine HCl (Benadryl) 25 mg 1X  ONCE IVP  Last administered on 6/30/ 17at 05:57;  Start 6/30/17 at 04:45;  Stop 6/30/17 at 04:46;  Status DC


Piperacillin Sod/ Tazobactam Sod 3.375 gm/Sodium Chloride 50 ml @  100 mls/hr 

Q6HRS IV  Last administered on 7/2/17at 06:27;  Start 6/30/17 at 12:00;  Stop 7/ 2/17 at 09:32;  Status DC


Vancomycin HCl 1 gm STK-MED ONCE .ROUTE  Last administered on 6/30/17at 06:37;  

Start 6/30/17 at 05:32;  Stop 6/30/17 at 05:33;  Status DC


Piperacillin Sod/ Tazobactam Sod (Zosyn) 3.375 gm STK-MED ONCE IV ;  Start 6/30/ 17 at 05:32;  Stop 6/30/17 at 05:33;  Status DC


Sodium Chloride 250 ml @  As Directed STK-MED ONCE .ROUTE  Last administered on 

6/30/17at 06:37;  Start 6/30/17 at 05:32;  Stop 6/30/17 at 05:33;  Status DC


Sodium Chloride 50 ml @ As Directed STK-MED ONCE .ROUTE ;  Start 6/30/17 at 05:

32;  Stop 6/30/17 at 05:33;  Status DC


Vancomycin HCl 750 mg/Sodium Chloride 250 ml @  250 mls/hr Q8H IV  Last 

administered on 7/1/17at 04:53;  Start 6/30/17 at 13:00;  Stop 7/1/17 at 14:08;

  Status DC


Vancomycin HCl 1 each 1X  ONCE MC  Last administered on 7/1/17at 12:30;  Start 7 /1/17 at 12:30;  Stop 7/1/17 at 12:31;  Status DC


Albuterol Sulfate (Ventolin) 2.5 mg PRN Q4HRS  PRN NEB SHORTNESS OF BREATH;  

Start 6/30/17 at 10:30


Albuterol Sulfate (Ventolin Hfa) 2 puff PRN Q4HRS  PRN IH SHORTNESS OF BREATH;  

Start 6/30/17 at 10:30;  Status UNV


Fludrocortisone Acetate (Florinef) 0.1 mg NOON PO  Last administered on 7/3/

17at 11:48;  Start 6/30/17 at 12:00


Acetaminophen/ Hydrocodone Bitart (Lortab 5/325) 1 tab PRN Q6HRS  PRN PO PAIN;  

Start 6/30/17 at 10:30


Metoclopramide HCl (Reglan) 10 mg PRN TID  PRN PO NAUSEA/VOMITING;  Start 6/30/ 17 at 10:30


Metoprolol Tartrate (Lopressor) 25 mg BID PO  Last administered on 7/3/17at 08:

01;  Start 6/30/17 at 10:30


Promethazine HCl (Phenergan) 25 mg PRN Q4HRS  PRN PO NAUSEA/VOMITING Last 

administered on 7/2/17at 20:15;  Start 6/30/17 at 10:30


Non-Formulary Medication 2 puff BID IH ;  Start 6/30/17 at 21:00;  Stop 6/30/17 

at 21:00;  Status DC


Pantoprazole Sodium (Protonix) 40 mg NOON PO  Last administered on 7/3/17at 11:

52;  Start 6/30/17 at 12:00


Hydrocortisone (Cortef) 5 mg BID PO  Last administered on 6/30/17at 11:16;  

Start 6/30/17 at 11:00;  Stop 6/30/17 at 11:39;  Status DC


Non-Formulary Medication 290 mcg PRN DAILY  PRN PO CONSTIPATION;  Start 6/30/17 

at 10:30;  Status UNV


Ondansetron HCl (Zofran Odt) 8 mg PRN Q4HRS  PRN PO NAUSEA/VOMITING;  Start 6/30 /17 at 11:00


Enoxaparin Sodium (Lovenox) 30 mg Q24H SQ  Last administered on 7/3/17at 10:13;

  Start 6/30/17 at 10:30


Albuterol Sulfate (Ventolin) 2.5 mg Q6H NEB  Last administered on 7/3/17at 11:26

;  Start 6/30/17 at 11:00


Budesonide (Pulmicort) 0.5 mg RTBID NEB  Last administered on 7/3/17at 11:26;  

Start 6/30/17 at 20:00


Promethazine HCl 12.5 mg/Sodium Chloride 50.5 ml @  101 mls/hr PRN Q6HRS  PRN 

IV NAUSEA/VOMITING Last administered on 7/3/17at 08:42;  Start 6/30/17 at 11:15


Hydrocortisone Sodium Succinate (Solu-CORTEF) 100 mg Q8HRS IV  Last 

administered on 7/3/17at 14:21;  Start 6/30/17 at 12:00


Diphenhydramine HCl (Benadryl) 25 mg PRN Q6HRS  PRN IVP ITCHING Last 

administered on 7/3/17at 10:11;  Start 6/30/17 at 14:00


Promethazine HCl (Phenergan) 25 mg STK-MED ONCE IV ;  Start 6/30/17 at 22:47;  

Stop 6/30/17 at 22:48;  Status DC


Sodium Chloride 50 ml @ As Directed STK-MED ONCE .ROUTE ;  Start 6/30/17 at 22:

48;  Stop 6/30/17 at 22:49;  Status DC


Fentanyl Citrate (Fentanyl 2ml Vial) 50 mcg PRN Q2HR  PRN IV PAIN Last 

administered on 7/3/17at 10:44;  Start 7/1/17 at 04:00


Potassium Chloride (Klor-Con) 20 meq 1X  ONCE PO  Last administered on 7/1/17at 

12:47;  Start 7/1/17 at 12:30;  Stop 7/1/17 at 12:31;  Status DC


Sodium Chloride 250 ml @  As Directed STK-MED ONCE .ROUTE  Last administered on 

7/1/17at 12:47;  Start 7/1/17 at 12:24;  Stop 7/1/17 at 12:25;  Status DC


Sodium Chloride 1,000 ml @  100 mls/hr Q10H IV  Last administered on 7/1/17at 16

:35;  Start 7/1/17 at 16:00;  Stop 7/2/17 at 06:14;  Status DC


Vancomycin HCl 1 gm/Sodium Chloride 250 ml @  250 mls/hr Q8H IV  Last 

administered on 7/2/17at 04:45;  Start 7/1/17 at 21:00;  Stop 7/2/17 at 09:32;  

Status DC


Vancomycin HCl 1 each 1X  ONCE MC ;  Start 7/2/17 at 20:30;  Stop 7/2/17 at 20:

31;  Status Cancel


Potassium Chloride (Klor-Con) 20 meq 1X  ONCE PO  Last administered on 7/1/17at 

16:34;  Start 7/1/17 at 17:00;  Stop 7/1/17 at 17:01;  Status DC


Piperacillin Sod/ Tazobactam Sod (Zosyn) 3.375 gm STK-MED ONCE IV  Last 

administered on 7/1/17at 18:37;  Start 7/1/17 at 18:37;  Stop 7/1/17 at 18:38;  

Status DC


Sodium Chloride 50 ml @ As Directed STK-MED ONCE .ROUTE  Last administered on 7/ 1/17at 18:37;  Start 7/1/17 at 18:37;  Stop 7/1/17 at 18:38;  Status DC


Sodium Chloride 50 ml @ As Directed STK-MED ONCE .ROUTE  Last administered on 7/ 1/17at 18:45;  Start 7/1/17 at 18:45;  Stop 7/1/17 at 18:46;  Status DC


Piperacillin Sod/ Tazobactam Sod (Zosyn) 3.375 gm STK-MED ONCE IV  Last 

administered on 7/2/17at 00:22;  Start 7/1/17 at 23:46;  Stop 7/1/17 at 23:47;  

Status DC


Sodium Chloride 50 ml @ As Directed STK-MED ONCE .ROUTE  Last administered on 7/ 1/17at 23:46;  Start 7/1/17 at 23:46;  Stop 7/1/17 at 23:47;  Status DC


Promethazine HCl (Phenergan) 25 mg STK-MED ONCE IV  Last administered on 7/2/ 17at 00:21;  Start 7/1/17 at 23:49;  Stop 7/1/17 at 23:50;  Status DC


Sodium Chloride 50 ml @ As Directed STK-MED ONCE .ROUTE  Last administered on 7/ 1/17at 23:50;  Start 7/1/17 at 23:50;  Stop 7/1/17 at 23:51;  Status DC


Magnesium Sulfate 50 ml @ 25 mls/hr 1X  ONCE IV  Last administered on 7/2/17at 

07:57;  Start 7/2/17 at 06:30;  Stop 7/2/17 at 08:29;  Status DC


Potassium Chloride 40 meq/ Sodium Chloride 1,020 ml @  75 mls/hr [id number] IV  

Last administered on 7/3/17at 11:52;  Start 7/2/17 at 06:30


Potassium Chloride (Klor-Con) 20 meq TIDWMEALS PO  Last administered on 7/3/

17at 11:49;  Start 7/2/17 at 08:00


Micafungin Sodium 100 mg/Dextrose 100 ml @  100 mls/hr Q24H IV  Last 

administered on 7/3/17at 10:13;  Start 7/2/17 at 10:00


Ceftriaxone Sodium 1 gm/ Sodium Chloride 50 ml @  100 mls/hr Q24H IV ;  Start 7/ 2/17 at 11:00;  Stop 7/2/17 at 11:00;  Status DC


Ceftriaxone Sodium 1 gm/ Sodium Chloride 50 ml @  100 mls/hr Q24H IV ;  Start 7/ 2/17 at 11:00;  Stop 7/2/17 at 11:00;  Status DC


Ceftriaxone Sodium 1 gm/ Sodium Chloride 50 ml @  100 mls/hr Q24H IV  Last 

administered on 7/3/17at 11:50;  Start 7/2/17 at 11:00





Active Scripts


Active


Reported


Albuterol Sulfate Neb Soln  ** (Albuterol Sulfate) 2.5 Mg/3 Ml Vial.neb 2.5 Mg 

NEB PRN Q4HRS PRN


Ventolin Hfa Inhaler (Albuterol Sulfate) 18 Gm Hfa.aer.ad 2 Puff IH PRN Q4HRS 

PRN


Symbicort 80-4.5 Mcg Inhaler (Budesonide/Formoterol Fumarate) 10.2 Gm 

Hfa.aer.ad 2 Puff IH BID


[place] 5-325 Tablet (Hydrocodone Bit/Acetaminophen) 1 Each Tablet 1-2 Tab PO Q4-

6HRS


Reglan (Metoclopramide Hcl) 10 Mg Tablet 10 Mg PO PRN TID PRN


Promethazine Hcl 25 Mg Tablet 25 Mg PO PRN Q4HRS PRN


Zofran Odt (Ondansetron) 8 Mg Tab.rapdis 8 Mg PO PRN Q4HRS PRN


Metoprolol Tartrate 25 Mg Tablet 25 Mg PO BID


Nexium Capsule (Esomeprazole Magnesium) 40 Mg Capsule.dr 40 Mg PO NOON


Fludrocortisone Acetate 0.1 Mg Tablet 0.1 Mg PO NOON


Cortef (Hydrocortisone) 5 Mg Tablet 5 Mg PO BID


Linzess (Linaclotide) 290 Mcg Capsule 290 Mcg PO PRN DAILY PRN


Vitals/I & O





Vital Sign - Last 24 Hours








 7/2/17 7/2/17 7/2/17 7/2/17





 16:35 16:36 16:42 19:20


 


Temp 98.6   


 


Pulse 60   


 


Resp 18   


 


B/P (MAP) 142/90 (107)   


 


Pulse Ox 99 98 96 


 


O2 Delivery Room Air Room Air Room Air Room Air


 


    





 7/2/17 7/2/17 7/2/17 7/2/17





 19:31 20:14 20:15 20:35


 


Temp 98.7   


 


Pulse 66 66  


 


Resp 20  18 


 


B/P (MAP) 123/79 (94) 123/79  


 


Pulse Ox 99   98


 


O2 Delivery Room Air  Room Air Room Air


 


    





 7/2/17 7/3/17 7/3/17 7/3/17





 20:40 05:31 05:39 05:45


 


Temp  98.3  


 


Pulse  51  


 


Resp  18 24 


 


B/P (MAP)  137/88 (104)  


 


Pulse Ox  97  98


 


O2 Delivery Room Air Room Air Room Air Room Air


 


    





 7/3/17 7/3/17 7/3/17 7/3/17





 08:01 08:07 08:13 10:44


 


Pulse 51   


 


Resp   20 20


 


B/P (MAP) 137/88   


 


Pulse Ox   98 98


 


O2 Delivery  Room Air Room Air Room Air





 7/3/17 7/3/17 7/3/17 7/3/17





 10:57 11:15 11:26 11:27


 


Temp 98.5   


 


Pulse 57   


 


Resp 20 20  


 


B/P (MAP) 115/74 (88)   


 


Pulse Ox 97 98 98 98


 


O2 Delivery Room Air Room Air Room Air Room Air














Intake and Output   


 


 7/2/17 7/2/17 7/3/17





 15:00 23:00 07:00


 


Intake Total  800 ml 120 ml


 


Output Total  3800 ml 2100 ml


 


Balance  -3000 ml -1980 ml

















MADISON MACKAY MD Jul 3, 2017 14:59

## 2017-07-04 VITALS — DIASTOLIC BLOOD PRESSURE: 92 MMHG | SYSTOLIC BLOOD PRESSURE: 137 MMHG

## 2017-07-04 VITALS — DIASTOLIC BLOOD PRESSURE: 74 MMHG | SYSTOLIC BLOOD PRESSURE: 115 MMHG

## 2017-07-04 VITALS — DIASTOLIC BLOOD PRESSURE: 76 MMHG | SYSTOLIC BLOOD PRESSURE: 120 MMHG

## 2017-07-04 VITALS — SYSTOLIC BLOOD PRESSURE: 128 MMHG | DIASTOLIC BLOOD PRESSURE: 88 MMHG

## 2017-07-04 LAB
ANION GAP SERPL CALC-SCNC: 6 MMOL/L (ref 6–14)
CA-I SERPL ISE-MCNC: 5 MG/DL (ref 7–20)
CALCIUM SERPL-MCNC: 8.1 MG/DL (ref 8.5–10.1)
CHLORIDE SERPL-SCNC: 104 MMOL/L (ref 98–107)
CO2 SERPL-SCNC: 29 MMOL/L (ref 21–32)
CREAT SERPL-MCNC: 0.6 MG/DL (ref 0.6–1)
GFR SERPLBLD BASED ON 1.73 SQ M-ARVRAT: 126.2 ML/MIN
GLUCOSE SERPL-MCNC: 103 MG/DL (ref 70–99)
POTASSIUM SERPL-SCNC: 3.5 MMOL/L (ref 3.5–5.1)
SODIUM SERPL-SCNC: 139 MMOL/L (ref 136–145)

## 2017-07-04 RX ADMIN — POTASSIUM CHLORIDE SCH MLS/HR: 2 INJECTION, SOLUTION, CONCENTRATE INTRAVENOUS at 12:32

## 2017-07-04 RX ADMIN — PROMETHAZINE HYDROCHLORIDE PRN MLS/HR: 25 INJECTION INTRAMUSCULAR; INTRAVENOUS at 08:33

## 2017-07-04 RX ADMIN — HYDROCORTISONE SODIUM SUCCINATE SCH MG: 100 INJECTION, POWDER, FOR SOLUTION INTRAMUSCULAR; INTRAVENOUS at 13:59

## 2017-07-04 RX ADMIN — ENOXAPARIN SODIUM SCH MG: 100 INJECTION SUBCUTANEOUS at 10:41

## 2017-07-04 RX ADMIN — ALBUTEROL SULFATE SCH MG: 108 AEROSOL, METERED RESPIRATORY (INHALATION) at 09:48

## 2017-07-04 RX ADMIN — FENTANYL CITRATE PRN MCG: 50 INJECTION, SOLUTION INTRAMUSCULAR; INTRAVENOUS at 16:12

## 2017-07-04 RX ADMIN — PROMETHAZINE HYDROCHLORIDE PRN MLS/HR: 25 INJECTION INTRAMUSCULAR; INTRAVENOUS at 16:12

## 2017-07-04 RX ADMIN — FENTANYL CITRATE PRN MCG: 50 INJECTION, SOLUTION INTRAMUSCULAR; INTRAVENOUS at 20:16

## 2017-07-04 RX ADMIN — FENTANYL CITRATE PRN MCG: 50 INJECTION, SOLUTION INTRAMUSCULAR; INTRAVENOUS at 08:17

## 2017-07-04 RX ADMIN — PANTOPRAZOLE SODIUM SCH MG: 40 TABLET, DELAYED RELEASE ORAL at 11:28

## 2017-07-04 RX ADMIN — METOCLOPRAMIDE HYDROCHLORIDE PRN MG: 10 TABLET ORAL at 12:30

## 2017-07-04 RX ADMIN — METOPROLOL TARTRATE SCH MG: 25 TABLET, FILM COATED ORAL at 09:09

## 2017-07-04 RX ADMIN — ALBUTEROL SULFATE SCH MG: 108 AEROSOL, METERED RESPIRATORY (INHALATION) at 15:40

## 2017-07-04 RX ADMIN — PROMETHAZINE HYDROCHLORIDE PRN MLS/HR: 25 INJECTION INTRAMUSCULAR; INTRAVENOUS at 23:18

## 2017-07-04 RX ADMIN — BUDESONIDE SCH MG: 0.5 SUSPENSION RESPIRATORY (INHALATION) at 09:48

## 2017-07-04 RX ADMIN — FLUDROCORTISONE ACETATE SCH MG: 0.1 TABLET ORAL at 11:28

## 2017-07-04 RX ADMIN — HYDROCORTISONE SODIUM SUCCINATE SCH MG: 100 INJECTION, POWDER, FOR SOLUTION INTRAMUSCULAR; INTRAVENOUS at 21:08

## 2017-07-04 RX ADMIN — BUDESONIDE SCH MG: 0.5 SUSPENSION RESPIRATORY (INHALATION) at 20:28

## 2017-07-04 RX ADMIN — FENTANYL CITRATE PRN MCG: 50 INJECTION, SOLUTION INTRAMUSCULAR; INTRAVENOUS at 12:30

## 2017-07-04 RX ADMIN — ALBUTEROL SULFATE SCH MG: 108 AEROSOL, METERED RESPIRATORY (INHALATION) at 20:28

## 2017-07-04 RX ADMIN — ALBUTEROL SULFATE SCH MG: 108 AEROSOL, METERED RESPIRATORY (INHALATION) at 04:49

## 2017-07-04 RX ADMIN — DEXTROSE SCH MLS/HR: 50 INJECTION, SOLUTION INTRAVENOUS at 10:41

## 2017-07-04 RX ADMIN — POTASSIUM CHLORIDE SCH MEQ: 1500 TABLET, EXTENDED RELEASE ORAL at 09:09

## 2017-07-04 RX ADMIN — POTASSIUM CHLORIDE SCH MEQ: 1500 TABLET, EXTENDED RELEASE ORAL at 11:28

## 2017-07-04 RX ADMIN — POTASSIUM CHLORIDE SCH MEQ: 1500 TABLET, EXTENDED RELEASE ORAL at 17:42

## 2017-07-04 RX ADMIN — METOPROLOL TARTRATE SCH MG: 25 TABLET, FILM COATED ORAL at 21:11

## 2017-07-04 RX ADMIN — FENTANYL CITRATE PRN MCG: 50 INJECTION, SOLUTION INTRAMUSCULAR; INTRAVENOUS at 23:18

## 2017-07-04 RX ADMIN — FENTANYL CITRATE PRN MCG: 50 INJECTION, SOLUTION INTRAMUSCULAR; INTRAVENOUS at 05:56

## 2017-07-04 RX ADMIN — HYDROCORTISONE SODIUM SUCCINATE SCH MG: 100 INJECTION, POWDER, FOR SOLUTION INTRAMUSCULAR; INTRAVENOUS at 05:56

## 2017-07-04 RX ADMIN — FENTANYL CITRATE PRN MCG: 50 INJECTION, SOLUTION INTRAMUSCULAR; INTRAVENOUS at 02:05

## 2017-07-04 NOTE — PDOC
PROGRESS NOTES


Diagnosis


DIAGNOSIS


streptococcal bacteremia


Fungemia


Carlin disease


Traumatic brain injury


paraplegia


Marked waekness and deconditioning


Problem


Problems


Medical Problems:


(1) Fever


Status: Acute  





(2) SIRS (systemic inflammatory response syndrome)


Status: Acute  


3 immune deficiency disease


4. Streptococcal bacteremia


5 Fungemia


6 Crook Disease


7 Traumatic brain injury











Assessment


Problems


Medical Problems:


(1) Fever


Status: Acute  





(2) SIRS (systemic inflammatory response syndrome)


Status: Acute  








PLAN


Continue I V Micafungin


Continue I V Ceftriaxone


Continue I V Fluids


continue steroid replacement


Monitor electrolytes


Nutritional support


Subjective


Sleepy but arousal


VSS


Objective





Vital Signs








  Date Time  Temp Pulse Resp B/P (MAP) Pulse Ox O2 Delivery O2 Flow Rate FiO2


 


7/4/17 12:30     96 Room Air  


 


7/4/17 10:19 98.0 57 20 128/88 (101)    














Intake and Output 


 


 7/4/17





 07:00


 


Intake Total 2231.38 ml


 


Output Total 2900 ml


 


Balance -668.62 ml


 


 


 


Intake Oral 870 ml


 


IV Total 1361.38 ml


 


Output Urine Total 2900 ml








Physical Exam


vss 


otherwise unremarkable


Review of Relevant


I have reviewed the following items berlin (where applicable) has been applied.


Labs





Laboratory Tests








Test


  7/3/17


05:35 7/4/17


04:59


 


White Blood Count


  5.7 x10^3/uL


(4.0-11.0) 


 


 


Red Blood Count


  3.71 x10^6/uL


(3.50-5.40) 


 


 


Hemoglobin


  11.2 g/dL


(12.0-15.5) 


 


 


Hematocrit


  33.2 %


(36.0-47.0) 


 


 


Mean Corpuscular Volume 89 fL ()  


 


Mean Corpuscular Hemoglobin 30 pg (25-35)  


 


Mean Corpuscular Hemoglobin


Concent 34 g/dL


(31-37) 


 


 


Red Cell Distribution Width


  13.0 %


(11.5-14.5) 


 


 


Platelet Count


  183 x10^3/uL


(140-400) 


 


 


Neutrophils (%) (Auto) 69 % (31-73)  


 


Lymphocytes (%) (Auto) 25 % (24-48)  


 


Monocytes (%) (Auto) 6 % (0-9)  


 


Eosinophils (%) (Auto) 0 % (0-3)  


 


Basophils (%) (Auto) 0 % (0-3)  


 


Neutrophils # (Auto)


  3.9 x10^3uL


(1.8-7.7) 


 


 


Lymphocytes # (Auto)


  1.4 x10^3/uL


(1.0-4.8) 


 


 


Monocytes # (Auto)


  0.3 x10^3/uL


(0.0-1.1) 


 


 


Eosinophils # (Auto)


  0.0 x10^3/uL


(0.0-0.7) 


 


 


Basophils # (Auto)


  0.0 x10^3/uL


(0.0-0.2) 


 


 


Sodium Level


  141 mmol/L


(136-145) 139 mmol/L


(136-145)


 


Potassium Level


  3.2 mmol/L


(3.5-5.1) 3.5 mmol/L


(3.5-5.1)


 


Chloride Level


  105 mmol/L


() 104 mmol/L


()


 


Carbon Dioxide Level


  29 mmol/L


(21-32) 29 mmol/L


(21-32)


 


Anion Gap 7 (6-14)  6 (6-14) 


 


Blood Urea Nitrogen 3 mg/dL (7-20)  5 mg/dL (7-20) 


 


Creatinine


  0.5 mg/dL


(0.6-1.0) 0.6 mg/dL


(0.6-1.0)


 


Estimated GFR


(Cockcroft-Gault) 155.7 


  126.2 


 


 


BUN/Creatinine Ratio 6 (6-20)  


 


Glucose Level


  93 mg/dL


(70-99) 103 mg/dL


(70-99)


 


Calcium Level


  8.2 mg/dL


(8.5-10.1) 8.1 mg/dL


(8.5-10.1)


 


Magnesium Level


  2.0 mg/dL


(1.8-2.4) 


 


 


Total Bilirubin


  0.2 mg/dL


(0.2-1.0) 


 


 


Aspartate Amino Transf


(AST/SGOT) 18 U/L (15-37) 


  


 


 


Alanine Aminotransferase


(ALT/SGPT) 48 U/L (14-59) 


  


 


 


Alkaline Phosphatase


  51 U/L


() 


 


 


Total Protein


  6.6 g/dL


(6.4-8.2) 


 


 


Albumin


  3.0 g/dL


(3.4-5.0) 


 


 


Albumin/Globulin Ratio 0.8 (1.0-1.7)  








Microbiology


6/29/17 Blood Culture - Final, Complete


          


6/29/17 Blood Culture Result 1 (BRANDON) - Final, Complete


Medications





Current Medications


Sodium Chloride 1,000 ml @  1,000 mls/hr 1X  ONCE IV  Last administered on 6/29/ 17at 23:25;  Start 6/29/17 at 23:00;  Stop 6/29/17 at 23:59;  Status DC


Acetaminophen (Tylenol) 1,000 mg 1X  ONCE PO ;  Start 6/29/17 at 23:15;  Stop 6/ 30/17 at 00:22;  Status DC


Promethazine HCl 25 mg/Sodium Chloride 51 ml @  101 mls/hr 1X  ONCE IV  Last 

administered on 6/29/17at 23:25;  Start 6/29/17 at 23:15;  Stop 6/29/17 at 23:45

;  Status DC


Fentanyl Citrate (Fentanyl 2ml Vial) 25 mcg PRN Q15MIN  PRN IV PAIN GREATER 

THAN 3/10 Last administered on 6/29/17at 23:25;  Start 6/29/17 at 23:15;  Stop 6 /30/17 at 10:48;  Status DC


Promethazine HCl (Phenergan) 25 mg STK-MED ONCE IV ;  Start 6/29/17 at 23:15;  

Stop 6/29/17 at 23:16;  Status DC


Sodium Chloride 50 ml @ As Directed STK-MED ONCE .ROUTE ;  Start 6/29/17 at 23:

16;  Stop 6/29/17 at 23:17;  Status DC


Acetaminophen (Tylenol) 1,000 mg 1X  ONCE PO  Last administered on 6/30/17at 00:

29;  Start 6/30/17 at 00:30;  Stop 6/30/17 at 02:41;  Status DC


Sodium Chloride 1,000 ml @  1,000 mls/hr 1X  ONCE IV  Last administered on 6/30/ 17at 02:30;  Start 6/30/17 at 00:45;  Stop 6/30/17 at 02:41;  Status DC


Vancomycin HCl (Vanco Per Pharmacy) 1 each PRN DAILY  PRN MC SEE COMMENTS Last 

administered on 7/1/17at 14:36;  Start 6/30/17 at 00:45;  Stop 7/2/17 at 10:00;

  Status DC


Piperacillin Sod/ Tazobactam Sod (Zosyn Per Pharmacy) 1 each PRN DAILY  PRN MC 

SEE COMMENTS Last administered on 7/1/17at 10:34;  Start 6/30/17 at 04:00;  

Stop 7/2/17 at 10:00;  Status DC


Hydrocortisone Sodium Succinate (Solu-CORTEF) 100 mg 1X  ONCE IV  Last 

administered on 6/30/17at 03:00;  Start 6/30/17 at 02:45;  Stop 6/30/17 at 02:46

;  Status DC


Ondansetron HCl (Zofran) 4 mg PRN Q4HRS  PRN IV NAUSEA/VOMITING;  Start 6/30/17 

at 02:30;  Stop 7/1/17 at 02:29;  Status DC


Fentanyl Citrate (Fentanyl 2ml Vial) 50 mcg PRN Q2HR  PRN IV PAIN Last 

administered on 7/1/17at 00:15;  Start 6/30/17 at 02:30;  Stop 7/1/17 at 02:29;

  Status DC


Sodium Chloride 1,000 ml @  150 mls/hr Q6H40M IV  Last administered on 6/30/ 17at 22:42;  Start 6/30/17 at 02:30;  Stop 7/1/17 at 02:29;  Status DC


Acetaminophen (Tylenol) 650 mg PRN Q4HRS  PRN PO FEVER Last administered on 6/30 /17at 22:42;  Start 6/30/17 at 02:30;  Stop 7/1/17 at 02:29;  Status DC


Vancomycin HCl 1 gm/Sodium Chloride 250 ml @  250 mls/hr ONCE  ONCE IV ;  Start 

6/30/17 at 03:00;  Stop 6/30/17 at 03:59;  Status DC


Piperacillin Sod/ Tazobactam Sod 3.375 gm/Sodium Chloride 50 ml @  100 mls/hr 

1X  ONCE IV  Last administered on 6/30/17at 05:58;  Start 6/30/17 at 04:00;  

Stop 6/30/17 at 04:29;  Status DC


Diphenhydramine HCl (Benadryl) 25 mg 1X  ONCE IVP  Last administered on 6/30/ 17at 05:57;  Start 6/30/17 at 04:45;  Stop 6/30/17 at 04:46;  Status DC


Piperacillin Sod/ Tazobactam Sod 3.375 gm/Sodium Chloride 50 ml @  100 mls/hr 

Q6HRS IV  Last administered on 7/2/17at 06:27;  Start 6/30/17 at 12:00;  Stop 7/ 2/17 at 09:32;  Status DC


Vancomycin HCl 1 gm STK-MED ONCE .ROUTE  Last administered on 6/30/17at 06:37;  

Start 6/30/17 at 05:32;  Stop 6/30/17 at 05:33;  Status DC


Piperacillin Sod/ Tazobactam Sod (Zosyn) 3.375 gm STK-MED ONCE IV ;  Start 6/30/ 17 at 05:32;  Stop 6/30/17 at 05:33;  Status DC


Sodium Chloride 250 ml @  As Directed STK-MED ONCE .ROUTE  Last administered on 

6/30/17at 06:37;  Start 6/30/17 at 05:32;  Stop 6/30/17 at 05:33;  Status DC


Sodium Chloride 50 ml @ As Directed STK-MED ONCE .ROUTE ;  Start 6/30/17 at 05:

32;  Stop 6/30/17 at 05:33;  Status DC


Vancomycin HCl 750 mg/Sodium Chloride 250 ml @  250 mls/hr Q8H IV  Last 

administered on 7/1/17at 04:53;  Start 6/30/17 at 13:00;  Stop 7/1/17 at 14:08;

  Status DC


Vancomycin HCl 1 each 1X  ONCE MC  Last administered on 7/1/17at 12:30;  Start 7 /1/17 at 12:30;  Stop 7/1/17 at 12:31;  Status DC


Albuterol Sulfate (Ventolin) 2.5 mg PRN Q4HRS  PRN NEB SHORTNESS OF BREATH;  

Start 6/30/17 at 10:30


Albuterol Sulfate (Ventolin Hfa) 2 puff PRN Q4HRS  PRN IH SHORTNESS OF BREATH;  

Start 6/30/17 at 10:30;  Status UNV


Fludrocortisone Acetate (Florinef) 0.1 mg NOON PO  Last administered on 7/4/ 17at 11:28;  Start 6/30/17 at 12:00


Acetaminophen/ Hydrocodone Bitart (Lortab 5/325) 1 tab PRN Q6HRS  PRN PO PAIN;  

Start 6/30/17 at 10:30


Metoclopramide HCl (Reglan) 10 mg PRN TID  PRN PO NAUSEA/VOMITING Last 

administered on 7/4/17at 12:30;  Start 6/30/17 at 10:30


Metoprolol Tartrate (Lopressor) 25 mg BID PO  Last administered on 7/4/17at 09:

09;  Start 6/30/17 at 10:30


Promethazine HCl (Phenergan) 25 mg PRN Q4HRS  PRN PO NAUSEA/VOMITING Last 

administered on 7/2/17at 20:15;  Start 6/30/17 at 10:30


Non-Formulary Medication 2 puff BID IH ;  Start 6/30/17 at 21:00;  Stop 6/30/17 

at 21:00;  Status DC


Pantoprazole Sodium (Protonix) 40 mg NOON PO  Last administered on 7/4/17at 11:

28;  Start 6/30/17 at 12:00


Hydrocortisone (Cortef) 5 mg BID PO  Last administered on 6/30/17at 11:16;  

Start 6/30/17 at 11:00;  Stop 6/30/17 at 11:39;  Status DC


Non-Formulary Medication 290 mcg PRN DAILY  PRN PO CONSTIPATION;  Start 6/30/17 

at 10:30;  Status UNV


Ondansetron HCl (Zofran Odt) 8 mg PRN Q4HRS  PRN PO NAUSEA/VOMITING;  Start 6/30 /17 at 11:00


Enoxaparin Sodium (Lovenox) 30 mg Q24H SQ  Last administered on 7/4/17at 10:41;

  Start 6/30/17 at 10:30


Albuterol Sulfate (Ventolin) 2.5 mg Q6H NEB  Last administered on 7/4/17at 09:48

;  Start 6/30/17 at 11:00


Budesonide (Pulmicort) 0.5 mg RTBID NEB  Last administered on 7/4/17at 09:48;  

Start 6/30/17 at 20:00


Promethazine HCl 12.5 mg/Sodium Chloride 50.5 ml @  101 mls/hr PRN Q6HRS  PRN 

IV NAUSEA/VOMITING Last administered on 7/4/17at 08:33;  Start 6/30/17 at 11:15


Hydrocortisone Sodium Succinate (Solu-CORTEF) 100 mg Q8HRS IV  Last 

administered on 7/4/17at 05:56;  Start 6/30/17 at 12:00


Diphenhydramine HCl (Benadryl) 25 mg PRN Q6HRS  PRN IVP ITCHING Last 

administered on 7/4/17at 10:32;  Start 6/30/17 at 14:00


Promethazine HCl (Phenergan) 25 mg STK-MED ONCE IV ;  Start 6/30/17 at 22:47;  

Stop 6/30/17 at 22:48;  Status DC


Sodium Chloride 50 ml @ As Directed STK-MED ONCE .ROUTE ;  Start 6/30/17 at 22:

48;  Stop 6/30/17 at 22:49;  Status DC


Fentanyl Citrate (Fentanyl 2ml Vial) 50 mcg PRN Q2HR  PRN IV PAIN Last 

administered on 7/4/17at 12:30;  Start 7/1/17 at 04:00


Potassium Chloride (Klor-Con) 20 meq 1X  ONCE PO  Last administered on 7/1/17at 

12:47;  Start 7/1/17 at 12:30;  Stop 7/1/17 at 12:31;  Status DC


Sodium Chloride 250 ml @  As Directed STK-MED ONCE .ROUTE  Last administered on 

7/1/17at 12:47;  Start 7/1/17 at 12:24;  Stop 7/1/17 at 12:25;  Status DC


Sodium Chloride 1,000 ml @  100 mls/hr Q10H IV  Last administered on 7/1/17at 16

:35;  Start 7/1/17 at 16:00;  Stop 7/2/17 at 06:14;  Status DC


Vancomycin HCl 1 gm/Sodium Chloride 250 ml @  250 mls/hr Q8H IV  Last 

administered on 7/2/17at 04:45;  Start 7/1/17 at 21:00;  Stop 7/2/17 at 09:32;  

Status DC


Vancomycin HCl 1 each 1X  ONCE MC ;  Start 7/2/17 at 20:30;  Stop 7/2/17 at 20:

31;  Status Cancel


Potassium Chloride (Klor-Con) 20 meq 1X  ONCE PO  Last administered on 7/1/17at 

16:34;  Start 7/1/17 at 17:00;  Stop 7/1/17 at 17:01;  Status DC


Piperacillin Sod/ Tazobactam Sod (Zosyn) 3.375 gm STK-MED ONCE IV  Last 

administered on 7/1/17at 18:37;  Start 7/1/17 at 18:37;  Stop 7/1/17 at 18:38;  

Status DC


Sodium Chloride 50 ml @ As Directed STK-MED ONCE .ROUTE  Last administered on 7/ 1/17at 18:37;  Start 7/1/17 at 18:37;  Stop 7/1/17 at 18:38;  Status DC


Sodium Chloride 50 ml @ As Directed STK-MED ONCE .ROUTE  Last administered on 7/ 1/17at 18:45;  Start 7/1/17 at 18:45;  Stop 7/1/17 at 18:46;  Status DC


Piperacillin Sod/ Tazobactam Sod (Zosyn) 3.375 gm STK-MED ONCE IV  Last 

administered on 7/2/17at 00:22;  Start 7/1/17 at 23:46;  Stop 7/1/17 at 23:47;  

Status DC


Sodium Chloride 50 ml @ As Directed STK-MED ONCE .ROUTE  Last administered on 7/ 1/17at 23:46;  Start 7/1/17 at 23:46;  Stop 7/1/17 at 23:47;  Status DC


Promethazine HCl (Phenergan) 25 mg STK-MED ONCE IV  Last administered on 7/2/ 17at 00:21;  Start 7/1/17 at 23:49;  Stop 7/1/17 at 23:50;  Status DC


Sodium Chloride 50 ml @ As Directed STK-MED ONCE .ROUTE  Last administered on 7/ 1/17at 23:50;  Start 7/1/17 at 23:50;  Stop 7/1/17 at 23:51;  Status DC


Magnesium Sulfate 50 ml @ 25 mls/hr 1X  ONCE IV  Last administered on 7/2/17at 

07:57;  Start 7/2/17 at 06:30;  Stop 7/2/17 at 08:29;  Status DC


Potassium Chloride 40 meq/ Sodium Chloride 1,020 ml @  75 mls/hr [id number] IV  

Last administered on 7/4/17at 12:32;  Start 7/2/17 at 06:30


Potassium Chloride (Klor-Con) 20 meq TIDWMEALS PO  Last administered on 7/3/

17at 11:49;  Start 7/2/17 at 08:00;  Stop 7/3/17 at 15:01;  Status DC


Micafungin Sodium 100 mg/Dextrose 100 ml @  100 mls/hr Q24H IV  Last 

administered on 7/4/17at 10:41;  Start 7/2/17 at 10:00


Ceftriaxone Sodium 1 gm/ Sodium Chloride 50 ml @  100 mls/hr Q24H IV ;  Start 7/ 2/17 at 11:00;  Stop 7/2/17 at 11:00;  Status DC


Ceftriaxone Sodium 1 gm/ Sodium Chloride 50 ml @  100 mls/hr Q24H IV ;  Start 7/ 2/17 at 11:00;  Stop 7/2/17 at 11:00;  Status DC


Ceftriaxone Sodium 1 gm/ Sodium Chloride 50 ml @  100 mls/hr Q24H IV  Last 

administered on 7/4/17at 11:28;  Start 7/2/17 at 11:00


Potassium Chloride (Klor-Con) 40 meq TIDWMEALS PO  Last administered on 7/4/ 17at 11:28;  Start 7/3/17 at 17:00


Promethazine HCl (Phenergan) 25 mg STK-MED ONCE IV ;  Start 7/3/17 at 22:33;  

Stop 7/3/17 at 22:34;  Status DC


Sodium Chloride 50 ml @ As Directed STK-MED ONCE .ROUTE ;  Start 7/3/17 at 22:33

;  Stop 7/3/17 at 22:34;  Status DC





Active Scripts


Active


Reported


Albuterol Sulfate Neb Soln  ** (Albuterol Sulfate) 2.5 Mg/3 Ml Vial.neb 2.5 Mg 

NEB PRN Q4HRS PRN


Ventolin Hfa Inhaler (Albuterol Sulfate) 18 Gm Hfa.aer.ad 2 Puff IH PRN Q4HRS 

PRN


Symbicort 80-4.5 Mcg Inhaler (Budesonide/Formoterol Fumarate) 10.2 Gm 

Hfa.aer.ad 2 Puff IH BID


Mongo 5-325 Tablet (Hydrocodone Bit/Acetaminophen) 1 Each Tablet 1-2 Tab PO Q4-

6HRS


Reglan (Metoclopramide Hcl) 10 Mg Tablet 10 Mg PO PRN TID PRN


Promethazine Hcl 25 Mg Tablet 25 Mg PO PRN Q4HRS PRN


Zofran Odt (Ondansetron) 8 Mg Tab.rapdis 8 Mg PO PRN Q4HRS PRN


Metoprolol Tartrate 25 Mg Tablet 25 Mg PO BID


Nexium Capsule (Esomeprazole Magnesium) 40 Mg Capsule.dr 40 Mg PO NOON


Fludrocortisone Acetate 0.1 Mg Tablet 0.1 Mg PO NOON


Cortef (Hydrocortisone) 5 Mg Tablet 5 Mg PO BID


Linzess (Linaclotide) 290 Mcg Capsule 290 Mcg PO PRN DAILY PRN


Vitals/I & O





Vital Sign - Last 24 Hours








 7/3/17 7/3/17 7/3/17 7/3/17





 15:53 16:30 16:57 18:23


 


Temp   98.5 


 


Pulse   63 


 


Resp 20  20 16


 


B/P (MAP)   120/75 (90) 


 


Pulse Ox 98 96 98 


 


O2 Delivery Room Air Room Air Room Air 


 


    





 7/3/17 7/3/17 7/3/17 7/3/17





 18:45 19:20 21:18 21:31


 


Temp 98.6   


 


Pulse 65  65 


 


Resp 18   


 


B/P (MAP) 123/77 (92)  123/77 


 


Pulse Ox 98   98


 


O2 Delivery Room Air Room Air  Room Air


 


    





 7/3/17 7/3/17 7/3/17 7/4/17





 21:32 22:04 22:44 02:05


 


Pulse  64  


 


Resp  18 18 18


 


B/P (MAP)  121/77 (92)  


 


Pulse Ox 98 99  


 


O2 Delivery Room Air Room Air Room Air Room Air





 7/4/17 7/4/17 7/4/17 7/4/17





 02:35 04:50 05:19 05:56


 


Temp   98.0 


 


Pulse   52 


 


Resp 18  16 18


 


B/P (MAP)   137/92 (107) 


 


Pulse Ox  98 94 


 


O2 Delivery  Room Air Room Air Room Air


 


    





 7/4/17 7/4/17 7/4/17 7/4/17





 08:00 08:17 09:09 09:11


 


Pulse   56 


 


B/P (MAP)   137/92 


 


Pulse Ox  94  94


 


O2 Delivery Room Air Room Air  Room Air





 7/4/17 7/4/17 7/4/17 





 09:50 10:19 12:30 


 


Temp  98.0  


 


Pulse  57  


 


Resp  20  


 


B/P (MAP)  128/88 (101)  


 


Pulse Ox 97 96 96 


 


O2 Delivery Room Air Room Air Room Air 














Intake and Output   


 


 7/3/17 7/3/17 7/4/17





 15:00 23:00 07:00


 


Intake Total 200.5 ml 1880.88 ml 150 ml


 


Output Total  1950 ml 950 ml


 


Balance 200.5 ml -69.12 ml -800 ml











Nutrition Consultation


Malnutrition Findings:


Body Fat Depletion (Non Severe:  Mild Depletion











MADISON MACKAY MD Jul 4, 2017 13:00

## 2017-07-05 VITALS — DIASTOLIC BLOOD PRESSURE: 93 MMHG | SYSTOLIC BLOOD PRESSURE: 138 MMHG

## 2017-07-05 VITALS — DIASTOLIC BLOOD PRESSURE: 76 MMHG | SYSTOLIC BLOOD PRESSURE: 120 MMHG

## 2017-07-05 VITALS — SYSTOLIC BLOOD PRESSURE: 173 MMHG | DIASTOLIC BLOOD PRESSURE: 102 MMHG

## 2017-07-05 VITALS — SYSTOLIC BLOOD PRESSURE: 109 MMHG | DIASTOLIC BLOOD PRESSURE: 67 MMHG

## 2017-07-05 VITALS — DIASTOLIC BLOOD PRESSURE: 80 MMHG | SYSTOLIC BLOOD PRESSURE: 128 MMHG

## 2017-07-05 VITALS — DIASTOLIC BLOOD PRESSURE: 95 MMHG | SYSTOLIC BLOOD PRESSURE: 153 MMHG

## 2017-07-05 VITALS — SYSTOLIC BLOOD PRESSURE: 104 MMHG | DIASTOLIC BLOOD PRESSURE: 60 MMHG

## 2017-07-05 VITALS — DIASTOLIC BLOOD PRESSURE: 70 MMHG | SYSTOLIC BLOOD PRESSURE: 109 MMHG

## 2017-07-05 VITALS — DIASTOLIC BLOOD PRESSURE: 68 MMHG | SYSTOLIC BLOOD PRESSURE: 11 MMHG

## 2017-07-05 VITALS — SYSTOLIC BLOOD PRESSURE: 123 MMHG | DIASTOLIC BLOOD PRESSURE: 76 MMHG

## 2017-07-05 VITALS — SYSTOLIC BLOOD PRESSURE: 161 MMHG | DIASTOLIC BLOOD PRESSURE: 104 MMHG

## 2017-07-05 VITALS — DIASTOLIC BLOOD PRESSURE: 86 MMHG | SYSTOLIC BLOOD PRESSURE: 137 MMHG

## 2017-07-05 VITALS — DIASTOLIC BLOOD PRESSURE: 95 MMHG | SYSTOLIC BLOOD PRESSURE: 146 MMHG

## 2017-07-05 VITALS — DIASTOLIC BLOOD PRESSURE: 88 MMHG | SYSTOLIC BLOOD PRESSURE: 138 MMHG

## 2017-07-05 VITALS — DIASTOLIC BLOOD PRESSURE: 88 MMHG | SYSTOLIC BLOOD PRESSURE: 135 MMHG

## 2017-07-05 VITALS — DIASTOLIC BLOOD PRESSURE: 86 MMHG | SYSTOLIC BLOOD PRESSURE: 131 MMHG

## 2017-07-05 LAB
% LYMPHS: 26 % (ref 24–48)
% MONOS: 3 % (ref 0–10)
% SEGS: 69 % (ref 35–66)
ALBUMIN SERPL-MCNC: 2.8 G/DL (ref 3.4–5)
ALBUMIN/GLOB SERPL: 0.8 {RATIO} (ref 1–1.7)
ALP SERPL-CCNC: 50 U/L (ref 46–116)
ALT SERPL-CCNC: 31 U/L (ref 14–59)
ANION GAP SERPL CALC-SCNC: 4 MMOL/L (ref 6–14)
ANISOCYTOSIS BLD QL SMEAR: SLIGHT
AST SERPL-CCNC: 9 U/L (ref 15–37)
BASOPHILS # BLD AUTO: 0 X10^3/UL (ref 0–0.2)
BASOPHILS NFR BLD: 0 % (ref 0–3)
BILIRUB SERPL-MCNC: 0.2 MG/DL (ref 0.2–1)
BUN/CREAT SERPL: 12 (ref 6–20)
CA-I SERPL ISE-MCNC: 6 MG/DL (ref 7–20)
CALCIUM SERPL-MCNC: 8.2 MG/DL (ref 8.5–10.1)
CHLORIDE SERPL-SCNC: 104 MMOL/L (ref 98–107)
CO2 SERPL-SCNC: 31 MMOL/L (ref 21–32)
CREAT SERPL-MCNC: 0.5 MG/DL (ref 0.6–1)
EOSINOPHIL NFR BLD: 0 % (ref 0–3)
EOSINOPHIL NFR BLD: 0 X10^3/UL (ref 0–0.7)
ERYTHROCYTE [DISTWIDTH] IN BLOOD BY AUTOMATED COUNT: 12.9 % (ref 11.5–14.5)
GFR SERPLBLD BASED ON 1.73 SQ M-ARVRAT: 155.7 ML/MIN
GLOBULIN SER-MCNC: 3.5 G/DL (ref 2.2–3.8)
GLUCOSE SERPL-MCNC: 104 MG/DL (ref 70–99)
HCT VFR BLD CALC: 35.2 % (ref 36–47)
HGB BLD-MCNC: 11.9 G/DL (ref 12–15.5)
LYMPHOCYTES # BLD: 1.6 X10^3/UL (ref 1–4.8)
LYMPHOCYTES NFR BLD AUTO: 19 % (ref 24–48)
MCH RBC QN AUTO: 30 PG (ref 25–35)
MCHC RBC AUTO-ENTMCNC: 34 G/DL (ref 31–37)
MCV RBC AUTO: 90 FL (ref 79–100)
MICROCYTES BLD QL SMEAR: SLIGHT
MONO #: 0.5 X10^3/UL (ref 0–1.1)
MONOCYTES NFR BLD: 6 % (ref 0–9)
NEUT #: 6.6 X10^3UL (ref 1.8–7.7)
NEUTROPHILS NFR BLD AUTO: 75 % (ref 31–73)
PLATELET # BLD AUTO: 207 X10^3/UL (ref 140–400)
PLATELET # BLD EST: ADEQUATE 10*3/UL
POTASSIUM SERPL-SCNC: 3.8 MMOL/L (ref 3.5–5.1)
PROT SERPL-MCNC: 6.3 G/DL (ref 6.4–8.2)
RBC # BLD AUTO: 3.92 X10^6/UL (ref 3.5–5.4)
SODIUM SERPL-SCNC: 139 MMOL/L (ref 136–145)
WBC # BLD AUTO: 8.7 X10^3/UL (ref 4–11)

## 2017-07-05 RX ADMIN — FENTANYL CITRATE PRN MCG: 50 INJECTION, SOLUTION INTRAMUSCULAR; INTRAVENOUS at 08:16

## 2017-07-05 RX ADMIN — ALBUTEROL SULFATE SCH MG: 108 AEROSOL, METERED RESPIRATORY (INHALATION) at 05:00

## 2017-07-05 RX ADMIN — FLUDROCORTISONE ACETATE SCH MG: 0.1 TABLET ORAL at 14:10

## 2017-07-05 RX ADMIN — POTASSIUM CHLORIDE SCH MEQ: 1500 TABLET, EXTENDED RELEASE ORAL at 08:14

## 2017-07-05 RX ADMIN — HYDROCORTISONE SODIUM SUCCINATE SCH MG: 100 INJECTION, POWDER, FOR SOLUTION INTRAMUSCULAR; INTRAVENOUS at 21:03

## 2017-07-05 RX ADMIN — HYDROCORTISONE SODIUM SUCCINATE SCH MG: 100 INJECTION, POWDER, FOR SOLUTION INTRAMUSCULAR; INTRAVENOUS at 14:10

## 2017-07-05 RX ADMIN — DEXTROSE SCH MLS/HR: 50 INJECTION, SOLUTION INTRAVENOUS at 09:39

## 2017-07-05 RX ADMIN — HYDROCORTISONE SODIUM SUCCINATE SCH MG: 100 INJECTION, POWDER, FOR SOLUTION INTRAMUSCULAR; INTRAVENOUS at 05:09

## 2017-07-05 RX ADMIN — POTASSIUM CHLORIDE SCH MEQ: 1500 TABLET, EXTENDED RELEASE ORAL at 08:00

## 2017-07-05 RX ADMIN — BUDESONIDE SCH MG: 0.5 SUSPENSION RESPIRATORY (INHALATION) at 20:44

## 2017-07-05 RX ADMIN — PANTOPRAZOLE SODIUM SCH MG: 40 TABLET, DELAYED RELEASE ORAL at 14:11

## 2017-07-05 RX ADMIN — ALBUTEROL SULFATE SCH MG: 108 AEROSOL, METERED RESPIRATORY (INHALATION) at 20:44

## 2017-07-05 RX ADMIN — FENTANYL CITRATE PRN MCG: 50 INJECTION, SOLUTION INTRAMUSCULAR; INTRAVENOUS at 04:20

## 2017-07-05 RX ADMIN — FENTANYL CITRATE PRN MCG: 50 INJECTION, SOLUTION INTRAMUSCULAR; INTRAVENOUS at 19:19

## 2017-07-05 RX ADMIN — FENTANYL CITRATE PRN MCG: 50 INJECTION, SOLUTION INTRAMUSCULAR; INTRAVENOUS at 22:34

## 2017-07-05 RX ADMIN — POTASSIUM CHLORIDE SCH MLS/HR: 2 INJECTION, SOLUTION, CONCENTRATE INTRAVENOUS at 04:17

## 2017-07-05 RX ADMIN — PROMETHAZINE HYDROCHLORIDE PRN MLS/HR: 25 INJECTION INTRAMUSCULAR; INTRAVENOUS at 22:34

## 2017-07-05 RX ADMIN — ALBUTEROL SULFATE SCH MG: 108 AEROSOL, METERED RESPIRATORY (INHALATION) at 09:32

## 2017-07-05 RX ADMIN — BUDESONIDE SCH MG: 0.5 SUSPENSION RESPIRATORY (INHALATION) at 09:32

## 2017-07-05 RX ADMIN — PROMETHAZINE HYDROCHLORIDE PRN MLS/HR: 25 INJECTION INTRAMUSCULAR; INTRAVENOUS at 13:24

## 2017-07-05 RX ADMIN — ENOXAPARIN SODIUM SCH MG: 100 INJECTION SUBCUTANEOUS at 10:55

## 2017-07-05 RX ADMIN — ALBUTEROL SULFATE SCH MG: 108 AEROSOL, METERED RESPIRATORY (INHALATION) at 15:33

## 2017-07-05 RX ADMIN — POTASSIUM CHLORIDE SCH MEQ: 1500 TABLET, EXTENDED RELEASE ORAL at 16:15

## 2017-07-05 RX ADMIN — FENTANYL CITRATE PRN MCG: 50 INJECTION, SOLUTION INTRAMUSCULAR; INTRAVENOUS at 11:52

## 2017-07-05 RX ADMIN — POTASSIUM CHLORIDE SCH MEQ: 1500 TABLET, EXTENDED RELEASE ORAL at 12:00

## 2017-07-05 RX ADMIN — METOPROLOL TARTRATE SCH MG: 25 TABLET, FILM COATED ORAL at 09:00

## 2017-07-05 RX ADMIN — METOPROLOL TARTRATE SCH MG: 25 TABLET, FILM COATED ORAL at 21:02

## 2017-07-05 RX ADMIN — FENTANYL CITRATE PRN MCG: 50 INJECTION, SOLUTION INTRAMUSCULAR; INTRAVENOUS at 16:15

## 2017-07-05 NOTE — PDOC
SUBJECTIVE:


The patient continues to complain of weakness and poor appetite. Mostly 

bedbound. She did complain of pain in her throat after they did the transfusion 

echocardiogram which was negative for any valvular "vegetation





OBJECTIVE:


Vital Signs:





Vital Signs








  Date Time  Temp Pulse Resp B/P (MAP) Pulse Ox O2 Delivery O2 Flow Rate FiO2


 


7/5/17 14:22 98.4 77 18 104/60 (75) 98 Room Air  








I & O











Intake and Output 


 


 7/5/17





 07:00


 


Intake Total 3411.98 ml


 


Output Total 2975 ml


 


Balance 436.98 ml


 


 


 


Intake Oral 1260 ml


 


IV Total 2151.98 ml


 


Output Urine Total 2975 ml








Labs:





Laboratory Tests








Test


  7/4/17


04:59 7/5/17


05:00


 


Sodium Level


  139 mmol/L


(136-145) 139 mmol/L


(136-145)


 


Potassium Level


  3.5 mmol/L


(3.5-5.1) 3.8 mmol/L


(3.5-5.1)


 


Chloride Level


  104 mmol/L


() 104 mmol/L


()


 


Carbon Dioxide Level


  29 mmol/L


(21-32) 31 mmol/L


(21-32)


 


Anion Gap 6 (6-14)  4 (6-14) 


 


Blood Urea Nitrogen 5 mg/dL (7-20)  6 mg/dL (7-20) 


 


Creatinine


  0.6 mg/dL


(0.6-1.0) 0.5 mg/dL


(0.6-1.0)


 


Estimated GFR


(Cockcroft-Gault) 126.2 


  155.7 


 


 


Glucose Level


  103 mg/dL


(70-99) 104 mg/dL


(70-99)


 


Calcium Level


  8.1 mg/dL


(8.5-10.1) 8.2 mg/dL


(8.5-10.1)


 


White Blood Count


  


  8.7 x10^3/uL


(4.0-11.0)


 


Red Blood Count


  


  3.92 x10^6/uL


(3.50-5.40)


 


Hemoglobin


  


  11.9 g/dL


(12.0-15.5)


 


Hematocrit


  


  35.2 %


(36.0-47.0)


 


Mean Corpuscular Volume  90 fL () 


 


Mean Corpuscular Hemoglobin  30 pg (25-35) 


 


Mean Corpuscular Hemoglobin


Concent 


  34 g/dL


(31-37)


 


Red Cell Distribution Width


  


  12.9 %


(11.5-14.5)


 


Platelet Count


  


  207 x10^3/uL


(140-400)


 


Neutrophils (%) (Auto)  75 % (31-73) 


 


Lymphocytes (%) (Auto)  19 % (24-48) 


 


Monocytes (%) (Auto)  6 % (0-9) 


 


Eosinophils (%) (Auto)  0 % (0-3) 


 


Basophils (%) (Auto)  0 % (0-3) 


 


Neutrophils # (Auto)


  


  6.6 x10^3uL


(1.8-7.7)


 


Lymphocytes # (Auto)


  


  1.6 x10^3/uL


(1.0-4.8)


 


Monocytes # (Auto)


  


  0.5 x10^3/uL


(0.0-1.1)


 


Eosinophils # (Auto)


  


  0.0 x10^3/uL


(0.0-0.7)


 


Basophils # (Auto)


  


  0.0 x10^3/uL


(0.0-0.2)


 


Segmented Neutrophils %  69 % (35-66) 


 


Lymphocytes %  26 % (24-48) 


 


Monocytes %  3 % (0-10) 


 


Platelet Estimate


  


  Adequate


(ADEQUATE)


 


Anisocytosis  Slight 


 


Microcytosis  Slight 


 


BUN/Creatinine Ratio  12 (6-20) 


 


Total Bilirubin


  


  0.2 mg/dL


(0.2-1.0)


 


Aspartate Amino Transf


(AST/SGOT) 


  9 U/L (15-37) 


 


 


Alanine Aminotransferase


(ALT/SGPT) 


  31 U/L (14-59) 


 


 


Alkaline Phosphatase


  


  50 U/L


()


 


Total Protein


  


  6.3 g/dL


(6.4-8.2)


 


Albumin


  


  2.8 g/dL


(3.4-5.0)


 


Albumin/Globulin Ratio  0.8 (1.0-1.7) 








Physical Exam:





Constitutional: Well developed, well nourished, no acute distress, non-toxic 

appearance. 


HENT: Normocephalic, atraumatic, bilateral external ears normal, oropharynx 

moist, no oral exudates, nose normal.


Eyes: NICHOLAS, EOMI, conjunctiva normal, no discharge.


Neck: Normal range of motion, no tenderness, supple, no stridor.


Cardiovascular: JVP not elevated.  No carotid bruit.  Nor precordial pulsations 

or heaves.  S1 N,S2N.  No murmurs.  No rubs or clicks.


Thorax and Lungs: NOrmal respiration.  Normal chest expansion.  Normal to 

percuss.  Equal breath sounds.  No crackles.  No wheeze.


Abdomen: Bowel sounds normal, soft, no tenderness, no masses, no pulsatile 

masses.


Skin: Warm, dry, no erythema, no rash.


Back: No tenderness, no CVA tenderness.


Extremities: Intact distal pulses, no tenderness, no cyanosis, no clubbing, ROM 

intact, no edema.  


Neurologic: Alert and oriented X 3, and has paraplegia and neurogenic bladder, 

normal sensory function,.


Psychologic: Affect normal, judgement normal, mood normal.





ASSESSMENT:


streptococcal bacteremia


Fungemia


Carlin disease


Traumatic brain injury


paraplegia


Marked waekness and deconditioning





PLAN:


I'll continue ceftriaxone IV daily continue with micafungin 100 mg IV daily.


Continue Synthroid replacement


Continue electrolyte replacement


I will contact Dr. Bowman the infectious disease specialist see whether it is 

worthwhile starting her on her IV immunoglobulin earlier than normal


Her blood culture grew Candida albicans that seems to be clinically responding.











MADISON MACKAY MD Jul 5, 2017 14:41

## 2017-07-05 NOTE — CARD
--------------- APPROVED REPORT --------------





EXAM: Transesophageal echocardiogram with color flow Doppler.



INDICATION

Infection:Rule out subacute bacterial endocarditis 



Reason For Test : Rule out endocarditis.



PROCEDURE

After obtaining informed consent, patient underwent transesophageal echo in the ICU.

Type of Sedation : Conscious Sedation

Sedation was administered by ОЛЬГА Martinez. 

Sedation was achieved with Versed 2 mg intravenously. 

Sedation was achieved with Fentanyl 50mg intravenously. 

Transesophageal probe was inserted and advanced into esophagus by Titus Jackson MD.

The FREDRICK was performed without complications. 

Throughout the procedure, the blood pressure, pulse oximetry, cardiac rhythm, and rate were monitored
.

The patient tolerated the procedure without adverse effects. Recovery from conscious sedation was une
ventful and vital signs were stable.



 LEFT VENTRICLE 

The left ventricle is normal size. There is normal left ventricular wall thickness. Left ventricle sy
stolic function is normal. The Ejection Fraction is 65-70%. There is normal LV segmental wall motion.
 No left ventricle thrombus noted on this study. There is no ventricular septal defect visualized.



 RIGHT VENTRICLE 

The right ventricle is normal size. The right ventricular systolic function is normal.



 ATRIA 

The left atrium size is normal. The right atrium size is normal. There is an catheter noted at the SV
C/RA junction, although a biofilm is noted on this catheter, a clear vegetation is not seen. The inte
ratrial septum is intact with no evidence for an atrial septal defect or patent foramen ovale as note
d on 2-D or Doppler imaging. There is no thrombus noted in the left atrial appendage.



 AORTIC VALVE 

The aortic valve is normal in structure and function. The aortic valve is trileaflet. Doppler and Col
or Flow revealed no significant aortic regurgitation. There is no aortic valvular vegetation.



 MITRAL VALVE 

The mitral valve is normal in structure and function. There are no vegetations seen on mitral valve. 
A slight mitral valve prolapse is present on P2 of the posterior leaflet. Doppler and Color Flow reve
aled trace to mild mitral regurgitation.



 TRICUSPID VALVE 

The tricuspid valve is normal in structure and function. Doppler and Color Flow revealed no significa
nt tricuspid valve regurgitation noted. There is no tricuspid valve vegetation seen.



 PULMONIC VALVE 

The pulmonic valve is not well visualized but appears normal in structure and function. No vegetation
 seen on pulmonic valve. Doppler and Color Flow revealed no pulmonic valvular regurgitation.



 GREAT VESSELS 

The aortic root is normal in size. The ascending aorta is normal in size. Normal pulmonary venous itz
w (Doppler). The IVC was visualized and appears normal in size. The SVC was visualized and appears no
rmal in size..



 PERICARDIAL EFFUSION 

There is no evidence of significant pericardial effusion.



Critical Notification

Critical Value: No



<Conclusion>

Left ventricle systolic function is normal. The Ejection Fraction is 65-70%.

There is normal LV segmental wall motion.

The right atrium size is normal. There is an catheter noted at the SVC/RA junction, although a biofil
m is noted on this catheter, a clear vegetation is not seen.

## 2017-07-06 VITALS — DIASTOLIC BLOOD PRESSURE: 87 MMHG | SYSTOLIC BLOOD PRESSURE: 139 MMHG

## 2017-07-06 VITALS — SYSTOLIC BLOOD PRESSURE: 111 MMHG | DIASTOLIC BLOOD PRESSURE: 68 MMHG

## 2017-07-06 VITALS — DIASTOLIC BLOOD PRESSURE: 81 MMHG | SYSTOLIC BLOOD PRESSURE: 125 MMHG

## 2017-07-06 VITALS — DIASTOLIC BLOOD PRESSURE: 74 MMHG | SYSTOLIC BLOOD PRESSURE: 117 MMHG

## 2017-07-06 VITALS — SYSTOLIC BLOOD PRESSURE: 115 MMHG | DIASTOLIC BLOOD PRESSURE: 74 MMHG

## 2017-07-06 LAB
ALBUMIN SERPL-MCNC: 2.8 G/DL (ref 3.4–5)
ALBUMIN/GLOB SERPL: 0.8 {RATIO} (ref 1–1.7)
ALP SERPL-CCNC: 50 U/L (ref 46–116)
ALT SERPL-CCNC: 24 U/L (ref 14–59)
ANION GAP SERPL CALC-SCNC: 2 MMOL/L (ref 6–14)
AST SERPL-CCNC: 7 U/L (ref 15–37)
BASOPHILS # BLD AUTO: 0 X10^3/UL (ref 0–0.2)
BASOPHILS NFR BLD: 0 % (ref 0–3)
BILIRUB SERPL-MCNC: 0.2 MG/DL (ref 0.2–1)
BUN/CREAT SERPL: 23 (ref 6–20)
CA-I SERPL ISE-MCNC: 9 MG/DL (ref 7–20)
CALCIUM SERPL-MCNC: 7.9 MG/DL (ref 8.5–10.1)
CHLORIDE SERPL-SCNC: 103 MMOL/L (ref 98–107)
CO2 SERPL-SCNC: 33 MMOL/L (ref 21–32)
CREAT SERPL-MCNC: 0.4 MG/DL (ref 0.6–1)
EOSINOPHIL NFR BLD: 0 % (ref 0–3)
EOSINOPHIL NFR BLD: 0 X10^3/UL (ref 0–0.7)
ERYTHROCYTE [DISTWIDTH] IN BLOOD BY AUTOMATED COUNT: 12.7 % (ref 11.5–14.5)
GFR SERPLBLD BASED ON 1.73 SQ M-ARVRAT: 201.5 ML/MIN
GLOBULIN SER-MCNC: 3.3 G/DL (ref 2.2–3.8)
GLUCOSE SERPL-MCNC: 95 MG/DL (ref 70–99)
HCT VFR BLD CALC: 34.5 % (ref 36–47)
HGB BLD-MCNC: 11.6 G/DL (ref 12–15.5)
LYMPHOCYTES # BLD: 1.4 X10^3/UL (ref 1–4.8)
LYMPHOCYTES NFR BLD AUTO: 16 % (ref 24–48)
MCH RBC QN AUTO: 30 PG (ref 25–35)
MCHC RBC AUTO-ENTMCNC: 34 G/DL (ref 31–37)
MCV RBC AUTO: 90 FL (ref 79–100)
MONO #: 0.6 X10^3/UL (ref 0–1.1)
MONOCYTES NFR BLD: 7 % (ref 0–9)
NEUT #: 7 X10^3UL (ref 1.8–7.7)
NEUTROPHILS NFR BLD AUTO: 77 % (ref 31–73)
PLATELET # BLD AUTO: 211 X10^3/UL (ref 140–400)
POTASSIUM SERPL-SCNC: 3.5 MMOL/L (ref 3.5–5.1)
PROT SERPL-MCNC: 6.1 G/DL (ref 6.4–8.2)
RBC # BLD AUTO: 3.84 X10^6/UL (ref 3.5–5.4)
SODIUM SERPL-SCNC: 138 MMOL/L (ref 136–145)
WBC # BLD AUTO: 9 X10^3/UL (ref 4–11)

## 2017-07-06 RX ADMIN — ALBUTEROL SULFATE SCH MG: 108 AEROSOL, METERED RESPIRATORY (INHALATION) at 12:06

## 2017-07-06 RX ADMIN — POTASSIUM CHLORIDE SCH MLS/HR: 2 INJECTION, SOLUTION, CONCENTRATE INTRAVENOUS at 20:17

## 2017-07-06 RX ADMIN — FLUCONAZOLE SCH MLS/HR: 2 INJECTION, SOLUTION INTRAVENOUS at 09:08

## 2017-07-06 RX ADMIN — PROMETHAZINE HYDROCHLORIDE PRN MG: 25 TABLET ORAL at 20:14

## 2017-07-06 RX ADMIN — HYDROCORTISONE SODIUM SUCCINATE SCH MG: 100 INJECTION, POWDER, FOR SOLUTION INTRAMUSCULAR; INTRAVENOUS at 05:17

## 2017-07-06 RX ADMIN — PANTOPRAZOLE SODIUM SCH MG: 40 TABLET, DELAYED RELEASE ORAL at 11:46

## 2017-07-06 RX ADMIN — ENOXAPARIN SODIUM SCH MG: 100 INJECTION SUBCUTANEOUS at 09:09

## 2017-07-06 RX ADMIN — FENTANYL CITRATE PRN MCG: 50 INJECTION, SOLUTION INTRAMUSCULAR; INTRAVENOUS at 05:17

## 2017-07-06 RX ADMIN — BUDESONIDE SCH MG: 0.5 SUSPENSION RESPIRATORY (INHALATION) at 12:06

## 2017-07-06 RX ADMIN — POTASSIUM CHLORIDE SCH MLS/HR: 2 INJECTION, SOLUTION, CONCENTRATE INTRAVENOUS at 09:07

## 2017-07-06 RX ADMIN — POTASSIUM CHLORIDE SCH MEQ: 1500 TABLET, EXTENDED RELEASE ORAL at 11:47

## 2017-07-06 RX ADMIN — FENTANYL CITRATE PRN MCG: 50 INJECTION, SOLUTION INTRAMUSCULAR; INTRAVENOUS at 14:08

## 2017-07-06 RX ADMIN — POTASSIUM CHLORIDE SCH MLS/HR: 2 INJECTION, SOLUTION, CONCENTRATE INTRAVENOUS at 01:06

## 2017-07-06 RX ADMIN — FENTANYL CITRATE PRN MCG: 50 INJECTION, SOLUTION INTRAMUSCULAR; INTRAVENOUS at 09:08

## 2017-07-06 RX ADMIN — PROMETHAZINE HYDROCHLORIDE PRN MLS/HR: 25 INJECTION INTRAMUSCULAR; INTRAVENOUS at 12:39

## 2017-07-06 RX ADMIN — HYDROCODONE BITARTRATE AND ACETAMINOPHEN PRN TAB: 5; 325 TABLET ORAL at 17:28

## 2017-07-06 RX ADMIN — HYDROCODONE BITARTRATE AND ACETAMINOPHEN PRN TAB: 5; 325 TABLET ORAL at 09:08

## 2017-07-06 RX ADMIN — HYDROCORTISONE SODIUM SUCCINATE SCH MG: 100 INJECTION, POWDER, FOR SOLUTION INTRAMUSCULAR; INTRAVENOUS at 14:08

## 2017-07-06 RX ADMIN — POTASSIUM CHLORIDE SCH MEQ: 1500 TABLET, EXTENDED RELEASE ORAL at 09:08

## 2017-07-06 RX ADMIN — ALBUTEROL SULFATE SCH MG: 108 AEROSOL, METERED RESPIRATORY (INHALATION) at 05:48

## 2017-07-06 RX ADMIN — PROMETHAZINE HYDROCHLORIDE PRN MLS/HR: 25 INJECTION INTRAMUSCULAR; INTRAVENOUS at 06:14

## 2017-07-06 RX ADMIN — FLUDROCORTISONE ACETATE SCH MG: 0.1 TABLET ORAL at 11:47

## 2017-07-06 RX ADMIN — BUDESONIDE SCH MG: 0.5 SUSPENSION RESPIRATORY (INHALATION) at 20:53

## 2017-07-06 RX ADMIN — ALBUTEROL SULFATE SCH MG: 108 AEROSOL, METERED RESPIRATORY (INHALATION) at 20:54

## 2017-07-06 RX ADMIN — HYDROCORTISONE SCH MG: 10 TABLET ORAL at 20:15

## 2017-07-06 RX ADMIN — POTASSIUM CHLORIDE SCH MEQ: 1500 TABLET, EXTENDED RELEASE ORAL at 17:14

## 2017-07-06 RX ADMIN — METOPROLOL TARTRATE SCH MG: 25 TABLET, FILM COATED ORAL at 20:15

## 2017-07-06 RX ADMIN — FENTANYL CITRATE PRN MCG: 50 INJECTION, SOLUTION INTRAMUSCULAR; INTRAVENOUS at 01:07

## 2017-07-06 RX ADMIN — METOPROLOL TARTRATE SCH MG: 25 TABLET, FILM COATED ORAL at 09:08

## 2017-07-06 RX ADMIN — ALBUTEROL SULFATE SCH MG: 108 AEROSOL, METERED RESPIRATORY (INHALATION) at 16:55

## 2017-07-06 NOTE — PDOC
SUBJECTIVE:


Continues to be weak and mostly sleepy


Eating a little bit more





OBJECTIVE:


The patient is sleepy but arousable


Vital Signs:





Vital Signs








  Date Time  Temp Pulse Resp B/P (MAP) Pulse Ox O2 Delivery O2 Flow Rate FiO2


 


7/6/17 14:08     96 Room Air  


 


7/6/17 09:38 98.4 60 20 139/87 (104)    








I & O











Intake and Output 


 


 7/6/17





 07:00


 


Intake Total 2241.16 ml


 


Output Total 2050 ml


 


Balance 191.16 ml


 


 


 


Intake Oral 580 ml


 


IV Total 1661.16 ml


 


Output Urine Total 2050 ml








Labs:





Laboratory Tests








Test


  7/5/17


05:00 7/6/17


05:20


 


White Blood Count


  8.7 x10^3/uL


(4.0-11.0) 9.0 x10^3/uL


(4.0-11.0)


 


Red Blood Count


  3.92 x10^6/uL


(3.50-5.40) 3.84 x10^6/uL


(3.50-5.40)


 


Hemoglobin


  11.9 g/dL


(12.0-15.5) 11.6 g/dL


(12.0-15.5)


 


Hematocrit


  35.2 %


(36.0-47.0) 34.5 %


(36.0-47.0)


 


Mean Corpuscular Volume 90 fL ()  90 fL () 


 


Mean Corpuscular Hemoglobin 30 pg (25-35)  30 pg (25-35) 


 


Mean Corpuscular Hemoglobin


Concent 34 g/dL


(31-37) 34 g/dL


(31-37)


 


Red Cell Distribution Width


  12.9 %


(11.5-14.5) 12.7 %


(11.5-14.5)


 


Platelet Count


  207 x10^3/uL


(140-400) 211 x10^3/uL


(140-400)


 


Neutrophils (%) (Auto) 75 % (31-73)  77 % (31-73) 


 


Lymphocytes (%) (Auto) 19 % (24-48)  16 % (24-48) 


 


Monocytes (%) (Auto) 6 % (0-9)  7 % (0-9) 


 


Eosinophils (%) (Auto) 0 % (0-3)  0 % (0-3) 


 


Basophils (%) (Auto) 0 % (0-3)  0 % (0-3) 


 


Neutrophils # (Auto)


  6.6 x10^3uL


(1.8-7.7) 7.0 x10^3uL


(1.8-7.7)


 


Lymphocytes # (Auto)


  1.6 x10^3/uL


(1.0-4.8) 1.4 x10^3/uL


(1.0-4.8)


 


Monocytes # (Auto)


  0.5 x10^3/uL


(0.0-1.1) 0.6 x10^3/uL


(0.0-1.1)


 


Eosinophils # (Auto)


  0.0 x10^3/uL


(0.0-0.7) 0.0 x10^3/uL


(0.0-0.7)


 


Basophils # (Auto)


  0.0 x10^3/uL


(0.0-0.2) 0.0 x10^3/uL


(0.0-0.2)


 


Segmented Neutrophils % 69 % (35-66)  


 


Lymphocytes % 26 % (24-48)  


 


Monocytes % 3 % (0-10)  


 


Platelet Estimate


  Adequate


(ADEQUATE) 


 


 


Anisocytosis Slight  


 


Microcytosis Slight  


 


Sodium Level


  139 mmol/L


(136-145) 138 mmol/L


(136-145)


 


Potassium Level


  3.8 mmol/L


(3.5-5.1) 3.5 mmol/L


(3.5-5.1)


 


Chloride Level


  104 mmol/L


() 103 mmol/L


()


 


Carbon Dioxide Level


  31 mmol/L


(21-32) 33 mmol/L


(21-32)


 


Anion Gap 4 (6-14)  2 (6-14) 


 


Blood Urea Nitrogen 6 mg/dL (7-20)  9 mg/dL (7-20) 


 


Creatinine


  0.5 mg/dL


(0.6-1.0) 0.4 mg/dL


(0.6-1.0)


 


Estimated GFR


(Cockcroft-Gault) 155.7 


  201.5 


 


 


BUN/Creatinine Ratio 12 (6-20)  23 (6-20) 


 


Glucose Level


  104 mg/dL


(70-99) 95 mg/dL


(70-99)


 


Calcium Level


  8.2 mg/dL


(8.5-10.1) 7.9 mg/dL


(8.5-10.1)


 


Total Bilirubin


  0.2 mg/dL


(0.2-1.0) 0.2 mg/dL


(0.2-1.0)


 


Aspartate Amino Transf


(AST/SGOT) 9 U/L (15-37) 


  7 U/L (15-37) 


 


 


Alanine Aminotransferase


(ALT/SGPT) 31 U/L (14-59) 


  24 U/L (14-59) 


 


 


Alkaline Phosphatase


  50 U/L


() 50 U/L


()


 


Total Protein


  6.3 g/dL


(6.4-8.2) 6.1 g/dL


(6.4-8.2)


 


Albumin


  2.8 g/dL


(3.4-5.0) 2.8 g/dL


(3.4-5.0)


 


Albumin/Globulin Ratio 0.8 (1.0-1.7)  0.8 (1.0-1.7) 











ASSESSMENT:


Vital signs are stable


the rset of physical exam is stable


Problem List:  





Immune deficiency disorder





Sepsis





SIRS (systemic inflammatory response syndrome)





PLAN:


To continue I V Ceftriaxone


To continue I V Fluconazole


D/C IV HYRDOCORTISONE


Switch to oral hydrocortisone 15mg po tid


await the results of blood c/s











MADISON MACKAY MD Jul 6, 2017 16:01

## 2017-07-07 VITALS — DIASTOLIC BLOOD PRESSURE: 71 MMHG | SYSTOLIC BLOOD PRESSURE: 113 MMHG

## 2017-07-07 VITALS — DIASTOLIC BLOOD PRESSURE: 90 MMHG | SYSTOLIC BLOOD PRESSURE: 136 MMHG

## 2017-07-07 VITALS — DIASTOLIC BLOOD PRESSURE: 77 MMHG | SYSTOLIC BLOOD PRESSURE: 117 MMHG

## 2017-07-07 LAB
ANION GAP SERPL CALC-SCNC: 2 MMOL/L (ref 6–14)
ANION GAP SERPL CALC-SCNC: 6 MMOL/L (ref 6–14)
CA-I SERPL ISE-MCNC: 4 MG/DL (ref 7–20)
CA-I SERPL ISE-MCNC: 4 MG/DL (ref 7–20)
CALCIUM SERPL-MCNC: 7.9 MG/DL (ref 8.5–10.1)
CALCIUM SERPL-MCNC: 7.9 MG/DL (ref 8.5–10.1)
CHLORIDE SERPL-SCNC: 105 MMOL/L (ref 98–107)
CHLORIDE SERPL-SCNC: 106 MMOL/L (ref 98–107)
CO2 SERPL-SCNC: 28 MMOL/L (ref 21–32)
CO2 SERPL-SCNC: 32 MMOL/L (ref 21–32)
CREAT SERPL-MCNC: 0.4 MG/DL (ref 0.6–1)
CREAT SERPL-MCNC: 0.5 MG/DL (ref 0.6–1)
GFR SERPLBLD BASED ON 1.73 SQ M-ARVRAT: 155.7 ML/MIN
GFR SERPLBLD BASED ON 1.73 SQ M-ARVRAT: 201.5 ML/MIN
GLUCOSE SERPL-MCNC: 110 MG/DL (ref 70–99)
GLUCOSE SERPL-MCNC: 94 MG/DL (ref 70–99)
POTASSIUM SERPL-SCNC: 2.9 MMOL/L (ref 3.5–5.1)
POTASSIUM SERPL-SCNC: 3.8 MMOL/L (ref 3.5–5.1)
SODIUM SERPL-SCNC: 139 MMOL/L (ref 136–145)
SODIUM SERPL-SCNC: 140 MMOL/L (ref 136–145)

## 2017-07-07 RX ADMIN — ALBUTEROL SULFATE SCH MG: 108 AEROSOL, METERED RESPIRATORY (INHALATION) at 09:21

## 2017-07-07 RX ADMIN — PROMETHAZINE HYDROCHLORIDE PRN MG: 25 TABLET ORAL at 08:10

## 2017-07-07 RX ADMIN — ENOXAPARIN SODIUM SCH MG: 100 INJECTION SUBCUTANEOUS at 08:10

## 2017-07-07 RX ADMIN — ALBUTEROL SULFATE SCH MG: 108 AEROSOL, METERED RESPIRATORY (INHALATION) at 05:00

## 2017-07-07 RX ADMIN — PROMETHAZINE HYDROCHLORIDE PRN MLS/HR: 25 INJECTION INTRAMUSCULAR; INTRAVENOUS at 14:17

## 2017-07-07 RX ADMIN — FLUCONAZOLE SCH MLS/HR: 2 INJECTION, SOLUTION INTRAVENOUS at 08:10

## 2017-07-07 RX ADMIN — PROMETHAZINE HYDROCHLORIDE PRN MLS/HR: 25 INJECTION INTRAMUSCULAR; INTRAVENOUS at 00:26

## 2017-07-07 RX ADMIN — HYDROCODONE BITARTRATE AND ACETAMINOPHEN PRN TAB: 5; 325 TABLET ORAL at 08:11

## 2017-07-07 RX ADMIN — ALBUTEROL SULFATE SCH MG: 108 AEROSOL, METERED RESPIRATORY (INHALATION) at 15:36

## 2017-07-07 RX ADMIN — FENTANYL CITRATE PRN MCG: 50 INJECTION, SOLUTION INTRAMUSCULAR; INTRAVENOUS at 14:17

## 2017-07-07 RX ADMIN — FENTANYL CITRATE PRN MCG: 50 INJECTION, SOLUTION INTRAMUSCULAR; INTRAVENOUS at 08:10

## 2017-07-07 RX ADMIN — METOPROLOL TARTRATE SCH MG: 25 TABLET, FILM COATED ORAL at 08:11

## 2017-07-07 RX ADMIN — FLUDROCORTISONE ACETATE SCH MG: 0.1 TABLET ORAL at 12:00

## 2017-07-07 RX ADMIN — FENTANYL CITRATE PRN MCG: 50 INJECTION, SOLUTION INTRAMUSCULAR; INTRAVENOUS at 06:02

## 2017-07-07 RX ADMIN — PANTOPRAZOLE SODIUM SCH MG: 40 TABLET, DELAYED RELEASE ORAL at 12:20

## 2017-07-07 RX ADMIN — HYDROCORTISONE SCH MG: 10 TABLET ORAL at 08:13

## 2017-07-07 RX ADMIN — HYDROCORTISONE SCH MG: 10 TABLET ORAL at 12:21

## 2017-07-07 RX ADMIN — BUDESONIDE SCH MG: 0.5 SUSPENSION RESPIRATORY (INHALATION) at 08:00

## 2017-07-07 RX ADMIN — POTASSIUM CHLORIDE SCH MLS/HR: 2 INJECTION, SOLUTION, CONCENTRATE INTRAVENOUS at 08:12

## 2017-07-07 RX ADMIN — FENTANYL CITRATE PRN MCG: 50 INJECTION, SOLUTION INTRAMUSCULAR; INTRAVENOUS at 00:27

## 2017-07-07 NOTE — PDOC3
Discharge Summary


Visit Information


Date of Admission:  Jun 30, 2017


Date of Discharge:  Jul 7, 2017


Admitting Diagnosis:  streptococcal bacteremia candida albican sepsis


Admitting Diagnosis Comments


She did grew E coli sensitive to rociphen


Final Diagnosis


Problems


Medical Problems:


(1) Fever


Status: Acute  





(2) SIRS (systemic inflammatory response syndrome)


Status: Acute  





Problems:  





Brief Hospital Course


Allergies





 Allergies








Coded Allergies Type Severity Reaction Last Updated Verified


 


  erythromycin base Allergy Intermediate  5/4/16 Yes


 


  I S O L A T I O N *CONTACT* Allergy Unknown  5/5/16 Yes








Vital Signs





Vital Signs








  Date Time  Temp Pulse Resp B/P (MAP) Pulse Ox O2 Delivery O2 Flow Rate FiO2


 


7/7/17 14:07 98.7 69 20 113/71 (85) 98 Room Air  








Lab Results





Laboratory Tests








Test


  7/6/17


05:20 7/7/17


05:50 7/7/17


16:00


 


White Blood Count


  9.0 x10^3/uL


(4.0-11.0) 


  


 


 


Red Blood Count


  3.84 x10^6/uL


(3.50-5.40) 


  


 


 


Hemoglobin


  11.6 g/dL


(12.0-15.5) 


  


 


 


Hematocrit


  34.5 %


(36.0-47.0) 


  


 


 


Mean Corpuscular Volume 90 fL ()   


 


Mean Corpuscular Hemoglobin 30 pg (25-35)   


 


Mean Corpuscular Hemoglobin


Concent 34 g/dL


(31-37) 


  


 


 


Red Cell Distribution Width


  12.7 %


(11.5-14.5) 


  


 


 


Platelet Count


  211 x10^3/uL


(140-400) 


  


 


 


Neutrophils (%) (Auto) 77 % (31-73)   


 


Lymphocytes (%) (Auto) 16 % (24-48)   


 


Monocytes (%) (Auto) 7 % (0-9)   


 


Eosinophils (%) (Auto) 0 % (0-3)   


 


Basophils (%) (Auto) 0 % (0-3)   


 


Neutrophils # (Auto)


  7.0 x10^3uL


(1.8-7.7) 


  


 


 


Lymphocytes # (Auto)


  1.4 x10^3/uL


(1.0-4.8) 


  


 


 


Monocytes # (Auto)


  0.6 x10^3/uL


(0.0-1.1) 


  


 


 


Eosinophils # (Auto)


  0.0 x10^3/uL


(0.0-0.7) 


  


 


 


Basophils # (Auto)


  0.0 x10^3/uL


(0.0-0.2) 


  


 


 


Sodium Level


  138 mmol/L


(136-145) 140 mmol/L


(136-145) 139 mmol/L


(136-145)


 


Potassium Level


  3.5 mmol/L


(3.5-5.1) 2.9 mmol/L


(3.5-5.1) 3.8 mmol/L


(3.5-5.1)


 


Chloride Level


  103 mmol/L


() 106 mmol/L


() 105 mmol/L


()


 


Carbon Dioxide Level


  33 mmol/L


(21-32) 28 mmol/L


(21-32) 32 mmol/L


(21-32)


 


Anion Gap 2 (6-14)  6 (6-14)  2 (6-14) 


 


Blood Urea Nitrogen 9 mg/dL (7-20)  4 mg/dL (7-20)  4 mg/dL (7-20) 


 


Creatinine


  0.4 mg/dL


(0.6-1.0) 0.4 mg/dL


(0.6-1.0) 0.5 mg/dL


(0.6-1.0)


 


Estimated GFR


(Cockcroft-Gault) 201.5 


  201.5 


  155.7 


 


 


BUN/Creatinine Ratio 23 (6-20)   


 


Glucose Level


  95 mg/dL


(70-99) 110 mg/dL


(70-99) 94 mg/dL


(70-99)


 


Calcium Level


  7.9 mg/dL


(8.5-10.1) 7.9 mg/dL


(8.5-10.1) 7.9 mg/dL


(8.5-10.1)


 


Total Bilirubin


  0.2 mg/dL


(0.2-1.0) 


  


 


 


Aspartate Amino Transf


(AST/SGOT) 7 U/L (15-37) 


  


  


 


 


Alanine Aminotransferase


(ALT/SGPT) 24 U/L (14-59) 


  


  


 


 


Alkaline Phosphatase


  50 U/L


() 


  


 


 


Total Protein


  6.1 g/dL


(6.4-8.2) 


  


 


 


Albumin


  2.8 g/dL


(3.4-5.0) 


  


 


 


Albumin/Globulin Ratio 0.8 (1.0-1.7)   








Brief Hospital Course


Ms. Vazquez  is a 21 old fen[place] who was admitted to this facility with 

streptococcal bacteremia ( three morphotypes)


She has grown Candida albican from blood culture for which she was treated with 

diflucan


Urine c/s pablo E Coli sensitive to Rociphen





Discharge Information


Condition at Discharge:  Improved, Comment


Dischare Medications





Current Medications


Sodium Chloride 1,000 ml @  1,000 mls/hr 1X  ONCE IV  Last administered on 6/29/ 17at 23:25;  Start 6/29/17 at 23:00;  Stop 6/29/17 at 23:59;  Status DC


Acetaminophen (Tylenol) 1,000 mg 1X  ONCE PO ;  Start 6/29/17 at 23:15;  Stop 6/ 30/17 at 00:22;  Status DC


Promethazine HCl 25 mg/Sodium Chloride 51 ml @  101 mls/hr 1X  ONCE IV  Last 

administered on 6/29/17at 23:25;  Start 6/29/17 at 23:15;  Stop 6/29/17 at 23:45

;  Status DC


Fentanyl Citrate (Fentanyl 2ml Vial) 25 mcg PRN Q15MIN  PRN IV PAIN GREATER 

THAN 3/10 Last administered on 6/29/17at 23:25;  Start 6/29/17 at 23:15;  Stop 6 /30/17 at 10:48;  Status DC


Promethazine HCl (Phenergan) 25 mg STK-MED ONCE IV ;  Start 6/29/17 at 23:15;  

Stop 6/29/17 at 23:16;  Status DC


Sodium Chloride 50 ml @ As Directed STK-MED ONCE .ROUTE ;  Start 6/29/17 at 23:

16;  Stop 6/29/17 at 23:17;  Status DC


Acetaminophen (Tylenol) 1,000 mg 1X  ONCE PO  Last administered on 6/30/17at 00:

29;  Start 6/30/17 at 00:30;  Stop 6/30/17 at 02:41;  Status DC


Sodium Chloride 1,000 ml @  1,000 mls/hr 1X  ONCE IV  Last administered on 6/30/ 17at 02:30;  Start 6/30/17 at 00:45;  Stop 6/30/17 at 02:41;  Status DC


Vancomycin HCl (Vanco Per Pharmacy) 1 each PRN DAILY  PRN MC SEE COMMENTS Last 

administered on 7/1/17at 14:36;  Start 6/30/17 at 00:45;  Stop 7/2/17 at 10:00;

  Status DC


Piperacillin Sod/ Tazobactam Sod (Zosyn Per Pharmacy) 1 each PRN DAILY  PRN MC 

SEE COMMENTS Last administered on 7/1/17at 10:34;  Start 6/30/17 at 04:00;  

Stop 7/2/17 at 10:00;  Status DC


Hydrocortisone Sodium Succinate (Solu-CORTEF) 100 mg 1X  ONCE IV  Last 

administered on 6/30/17at 03:00;  Start 6/30/17 at 02:45;  Stop 6/30/17 at 02:46

;  Status DC


Ondansetron HCl (Zofran) 4 mg PRN Q4HRS  PRN IV NAUSEA/VOMITING;  Start 6/30/17 

at 02:30;  Stop 7/1/17 at 02:29;  Status DC


Fentanyl Citrate (Fentanyl 2ml Vial) 50 mcg PRN Q2HR  PRN IV PAIN Last 

administered on 7/1/17at 00:15;  Start 6/30/17 at 02:30;  Stop 7/1/17 at 02:29;

  Status DC


Sodium Chloride 1,000 ml @  150 mls/hr Q6H40M IV  Last administered on 6/30/ 17at 22:42;  Start 6/30/17 at 02:30;  Stop 7/1/17 at 02:29;  Status DC


Acetaminophen (Tylenol) 650 mg PRN Q4HRS  PRN PO FEVER Last administered on 6/30 /17at 22:42;  Start 6/30/17 at 02:30;  Stop 7/1/17 at 02:29;  Status DC


Vancomycin HCl 1 gm/Sodium Chloride 250 ml @  250 mls/hr ONCE  ONCE IV ;  Start 

6/30/17 at 03:00;  Stop 6/30/17 at 03:59;  Status DC


Piperacillin Sod/ Tazobactam Sod 3.375 gm/Sodium Chloride 50 ml @  100 mls/hr 

1X  ONCE IV  Last administered on 6/30/17at 05:58;  Start 6/30/17 at 04:00;  

Stop 6/30/17 at 04:29;  Status DC


Diphenhydramine HCl (Benadryl) 25 mg 1X  ONCE IVP  Last administered on 6/30/ 17at 05:57;  Start 6/30/17 at 04:45;  Stop 6/30/17 at 04:46;  Status DC


Piperacillin Sod/ Tazobactam Sod 3.375 gm/Sodium Chloride 50 ml @  100 mls/hr 

Q6HRS IV  Last administered on 7/2/17at 06:27;  Start 6/30/17 at 12:00;  Stop 7/ 2/17 at 09:32;  Status DC


Vancomycin HCl 1 gm STK-MED ONCE .ROUTE  Last administered on 6/30/17at 06:37;  

Start 6/30/17 at 05:32;  Stop 6/30/17 at 05:33;  Status DC


Piperacillin Sod/ Tazobactam Sod (Zosyn) 3.375 gm STK-MED ONCE IV ;  Start 6/30/ 17 at 05:32;  Stop 6/30/17 at 05:33;  Status DC


Sodium Chloride 250 ml @  As Directed STK-MED ONCE .ROUTE  Last administered on 

6/30/17at 06:37;  Start 6/30/17 at 05:32;  Stop 6/30/17 at 05:33;  Status DC


Sodium Chloride 50 ml @ As Directed STK-MED ONCE .ROUTE ;  Start 6/30/17 at 05:

32;  Stop 6/30/17 at 05:33;  Status DC


Vancomycin HCl 750 mg/Sodium Chloride 250 ml @  250 mls/hr Q8H IV  Last 

administered on 7/1/17at 04:53;  Start 6/30/17 at 13:00;  Stop 7/1/17 at 14:08;

  Status DC


Vancomycin HCl 1 each 1X  ONCE MC  Last administered on 7/1/17at 12:30;  Start 7 /1/17 at 12:30;  Stop 7/1/17 at 12:31;  Status DC


Albuterol Sulfate (Ventolin) 2.5 mg PRN Q4HRS  PRN NEB SHORTNESS OF BREATH;  

Start 6/30/17 at 10:30


Albuterol Sulfate (Ventolin Hfa) 2 puff PRN Q4HRS  PRN IH SHORTNESS OF BREATH;  

Start 6/30/17 at 10:30;  Status UNV


Fludrocortisone Acetate (Florinef) 0.1 mg NOON PO  Last administered on 7/7/ 17at 12:00;  Start 6/30/17 at 12:00


Acetaminophen/ Hydrocodone Bitart (Lortab 5/325) 1 tab PRN Q6HRS  PRN PO PAIN 

Last administered on 7/7/17at 08:11;  Start 6/30/17 at 10:30


Metoclopramide HCl (Reglan) 10 mg PRN TID  PRN PO NAUSEA/VOMITING Last 

administered on 7/4/17at 12:30;  Start 6/30/17 at 10:30


Metoprolol Tartrate (Lopressor) 25 mg BID PO  Last administered on 7/7/17at 08:

11;  Start 6/30/17 at 10:30


Promethazine HCl (Phenergan) 25 mg PRN Q4HRS  PRN PO NAUSEA/VOMITING Last 

administered on 7/7/17at 08:10;  Start 6/30/17 at 10:30


Non-Formulary Medication 2 puff BID IH ;  Start 6/30/17 at 21:00;  Stop 6/30/17 

at 21:00;  Status DC


Pantoprazole Sodium (Protonix) 40 mg NOON PO  Last administered on 7/7/17at 12:

20;  Start 6/30/17 at 12:00


Hydrocortisone (Cortef) 5 mg BID PO  Last administered on 6/30/17at 11:16;  

Start 6/30/17 at 11:00;  Stop 6/30/17 at 11:39;  Status DC


Non-Formulary Medication 290 mcg PRN DAILY  PRN PO CONSTIPATION;  Start 6/30/17 

at 10:30;  Status UNV


Ondansetron HCl (Zofran Odt) 8 mg PRN Q4HRS  PRN PO NAUSEA/VOMITING;  Start 6/30 /17 at 11:00


Enoxaparin Sodium (Lovenox) 30 mg Q24H SQ  Last administered on 7/7/17at 08:10;

  Start 6/30/17 at 10:30


Albuterol Sulfate (Ventolin) 2.5 mg Q6H NEB  Last administered on 7/7/17at 05:00

;  Start 6/30/17 at 11:00


Budesonide (Pulmicort) 0.5 mg RTBID NEB  Last administered on 7/6/17at 20:53;  

Start 6/30/17 at 20:00


Promethazine HCl 12.5 mg/Sodium Chloride 50.5 ml @  101 mls/hr PRN Q6HRS  PRN 

IV NAUSEA/VOMITING Last administered on 7/7/17at 14:17;  Start 6/30/17 at 11:15


Hydrocortisone Sodium Succinate (Solu-CORTEF) 100 mg Q8HRS IV  Last 

administered on 7/6/17at 14:08;  Start 6/30/17 at 12:00;  Stop 7/6/17 at 15:55;

  Status DC


Diphenhydramine HCl (Benadryl) 25 mg PRN Q6HRS  PRN IVP ITCHING Last 

administered on 7/7/17at 10:02;  Start 6/30/17 at 14:00


Promethazine HCl (Phenergan) 25 mg STK-MED ONCE IV ;  Start 6/30/17 at 22:47;  

Stop 6/30/17 at 22:48;  Status DC


Sodium Chloride 50 ml @ As Directed STK-MED ONCE .ROUTE ;  Start 6/30/17 at 22:

48;  Stop 6/30/17 at 22:49;  Status DC


Fentanyl Citrate (Fentanyl 2ml Vial) 50 mcg PRN Q2HR  PRN IV PAIN Last 

administered on 7/7/17at 14:17;  Start 7/1/17 at 04:00


Potassium Chloride (Klor-Con) 20 meq 1X  ONCE PO  Last administered on 7/1/17at 

12:47;  Start 7/1/17 at 12:30;  Stop 7/1/17 at 12:31;  Status DC


Sodium Chloride 250 ml @  As Directed STK-MED ONCE .ROUTE  Last administered on 

7/1/17at 12:47;  Start 7/1/17 at 12:24;  Stop 7/1/17 at 12:25;  Status DC


Sodium Chloride 1,000 ml @  100 mls/hr Q10H IV  Last administered on 7/1/17at 16

:35;  Start 7/1/17 at 16:00;  Stop 7/2/17 at 06:14;  Status DC


Vancomycin HCl 1 gm/Sodium Chloride 250 ml @  250 mls/hr Q8H IV  Last 

administered on 7/2/17at 04:45;  Start 7/1/17 at 21:00;  Stop 7/2/17 at 09:32;  

Status DC


Vancomycin HCl 1 each 1X  ONCE MC ;  Start 7/2/17 at 20:30;  Stop 7/2/17 at 20:

31;  Status Cancel


Potassium Chloride (Klor-Con) 20 meq 1X  ONCE PO  Last administered on 7/1/17at 

16:34;  Start 7/1/17 at 17:00;  Stop 7/1/17 at 17:01;  Status DC


Piperacillin Sod/ Tazobactam Sod (Zosyn) 3.375 gm STK-MED ONCE IV  Last 

administered on 7/1/17at 18:37;  Start 7/1/17 at 18:37;  Stop 7/1/17 at 18:38;  

Status DC


Sodium Chloride 50 ml @ As Directed STK-MED ONCE .ROUTE  Last administered on 7/ 1/17at 18:37;  Start 7/1/17 at 18:37;  Stop 7/1/17 at 18:38;  Status DC


Sodium Chloride 50 ml @ As Directed STK-MED ONCE .ROUTE  Last administered on 7/ 1/17at 18:45;  Start 7/1/17 at 18:45;  Stop 7/1/17 at 18:46;  Status DC


Piperacillin Sod/ Tazobactam Sod (Zosyn) 3.375 gm STK-MED ONCE IV  Last 

administered on 7/2/17at 00:22;  Start 7/1/17 at 23:46;  Stop 7/1/17 at 23:47;  

Status DC


Sodium Chloride 50 ml @ As Directed STK-MED ONCE .ROUTE  Last administered on 7/ 1/17at 23:46;  Start 7/1/17 at 23:46;  Stop 7/1/17 at 23:47;  Status DC


Promethazine HCl (Phenergan) 25 mg STK-MED ONCE IV  Last administered on 7/2/ 17at 00:21;  Start 7/1/17 at 23:49;  Stop 7/1/17 at 23:50;  Status DC


Sodium Chloride 50 ml @ As Directed STK-MED ONCE .ROUTE  Last administered on 7/ 1/17at 23:50;  Start 7/1/17 at 23:50;  Stop 7/1/17 at 23:51;  Status DC


Magnesium Sulfate 50 ml @ 25 mls/hr 1X  ONCE IV  Last administered on 7/2/17at 

07:57;  Start 7/2/17 at 06:30;  Stop 7/2/17 at 08:29;  Status DC


Potassium Chloride 40 meq/ Sodium Chloride 1,020 ml @  75 mls/hr [id number] IV  

Last administered on 7/7/17at 08:12;  Start 7/2/17 at 06:30


Potassium Chloride (Klor-Con) 20 meq TIDWMEALS PO  Last administered on 7/3/

17at 11:49;  Start 7/2/17 at 08:00;  Stop 7/3/17 at 15:01;  Status DC


Micafungin Sodium 100 mg/Dextrose 100 ml @  100 mls/hr Q24H IV  Last 

administered on 7/5/17at 09:39;  Start 7/2/17 at 10:00;  Stop 7/5/17 at 16:29;  

Status DC


Ceftriaxone Sodium 1 gm/ Sodium Chloride 50 ml @  100 mls/hr Q24H IV ;  Start 7/ 2/17 at 11:00;  Stop 7/2/17 at 11:00;  Status DC


Ceftriaxone Sodium 1 gm/ Sodium Chloride 50 ml @  100 mls/hr Q24H IV ;  Start 7/ 2/17 at 11:00;  Stop 7/2/17 at 11:00;  Status DC


Ceftriaxone Sodium 1 gm/ Sodium Chloride 50 ml @  100 mls/hr Q24H IV  Last 

administered on 7/7/17at 10:21;  Start 7/2/17 at 11:00


Potassium Chloride (Klor-Con) 40 meq TIDWMEALS PO  Last administered on 7/6/ 17at 17:14;  Start 7/3/17 at 17:00;  Stop 7/7/17 at 07:07;  Status DC


Promethazine HCl (Phenergan) 25 mg STK-MED ONCE IV ;  Start 7/3/17 at 22:33;  

Stop 7/3/17 at 22:34;  Status DC


Sodium Chloride 50 ml @ As Directed STK-MED ONCE .ROUTE ;  Start 7/3/17 at 22:33

;  Stop 7/3/17 at 22:34;  Status DC


Promethazine HCl (Phenergan) 25 mg STK-MED ONCE IV ;  Start 7/4/17 at 23:11;  

Stop 7/4/17 at 23:12;  Status DC


Sodium Chloride 50 ml @ As Directed STK-MED ONCE .ROUTE ;  Start 7/4/17 at 23:11

;  Stop 7/4/17 at 23:12;  Status DC


Propofol 100 ml @  As Directed STK-MED ONCE IV ;  Start 7/5/17 at 10:45;  Stop 7 /5/17 at 10:46;  Status DC


Benzocaine (Hurricaine One) 1 spray STK-MED ONCE .ROUTE ;  Start 7/5/17 at 11:00

;  Stop 7/5/17 at 11:01;  Status DC


Lidocaine HCl 15 ml STK-MED ONCE .ROUTE ;  Start 7/5/17 at 11:00;  Stop 7/5/17 

at 11:01;  Status DC


Lidocaine HCl (Xylocaine 2% Topical 30gm Tube) 1 peter 1X  ONCE TP  Last 

administered on 7/5/17at 11:50;  Start 7/5/17 at 11:15;  Stop 7/5/17 at 11:16;  

Status DC


Benzocaine (Hurricaine One) 3 spray 1X  ONCE MM  Last administered on 7/5/17at 

11:18;  Start 7/5/17 at 11:30;  Stop 7/5/17 at 11:31;  Status DC


Lidocaine HCl 30 ml 1X  ONCE SWSW  Last administered on 7/5/17at 11:17;  Start 7 /5/17 at 11:15;  Stop 7/5/17 at 11:16;  Status DC


Midazolam HCl (Versed) 5 mg STK-MED ONCE .ROUTE  Last administered on 7/5/17at 

11:51;  Start 7/5/17 at 11:12;  Stop 7/5/17 at 11:13;  Status DC


Fluconazole/ Sodium Chloride 50 ml @  100 mls/hr Q24H IV  Last administered on 7 /7/17at 08:10;  Start 7/6/17 at 09:00


Promethazine HCl (Phenergan) 25 mg STK-MED ONCE IV ;  Start 7/6/17 at 06:05;  

Stop 7/6/17 at 06:06;  Status DC


Sodium Chloride 50 ml @ As Directed STK-MED ONCE .ROUTE ;  Start 7/6/17 at 06:05

;  Stop 7/6/17 at 06:06;  Status DC


Hydrocortisone (Cortef) 15 mg TID PO  Last administered on 7/7/17at 12:21;  

Start 7/6/17 at 21:00


Promethazine HCl (Phenergan) 25 mg STK-MED ONCE IV ;  Start 7/7/17 at 00:20;  

Stop 7/7/17 at 00:21;  Status DC


Sodium Chloride 50 ml @ As Directed STK-MED ONCE .ROUTE ;  Start 7/7/17 at 00:20

;  Stop 7/7/17 at 00:21;  Status DC


Potassium Chloride (Klor-Con) 40 meq 1X  ONCE PO  Last administered on 7/7/17at 

08:11;  Start 7/7/17 at 08:00;  Stop 7/7/17 at 08:02;  Status DC


Potassium Chloride (Klor-Con) 40 meq 1X  ONCE PO  Last administered on 7/7/17at 

10:00;  Start 7/7/17 at 10:00;  Stop 7/7/17 at 10:01;  Status DC


Potassium Chloride (Klor-Con) 40 meq 1X  ONCE PO  Last administered on 7/7/17at 

12:20;  Start 7/7/17 at 12:00;  Stop 7/7/17 at 12:01;  Status DC


Potassium Chloride (Klor-Con) 40 meq [id number] PO ;  Start 7/7/17 at 17:00





Active Scripts


Active


Reported


Albuterol Sulfate Neb Soln  ** (Albuterol Sulfate) 2.5 Mg/3 Ml Vial.neb 2.5 Mg 

NEB PRN Q4HRS PRN


Ventolin Hfa Inhaler (Albuterol Sulfate) 18 Gm Hfa.aer.ad 2 Puff IH PRN Q4HRS 

PRN


Symbicort 80-4.5 Mcg Inhaler (Budesonide/Formoterol Fumarate) 10.2 Gm 

Hfa.aer.ad 2 Puff IH BID


[place] 5-325 Tablet (Hydrocodone Bit/Acetaminophen) 1 Each Tablet 1-2 Tab PO Q4-

6HRS


Reglan (Metoclopramide Hcl) 10 Mg Tablet 10 Mg PO PRN TID PRN


Promethazine Hcl 25 Mg Tablet 25 Mg PO PRN Q4HRS PRN


Zofran Odt (Ondansetron) 8 Mg Tab.rapdis 8 Mg PO PRN Q4HRS PRN


Metoprolol Tartrate 25 Mg Tablet 25 Mg PO BID


Nexium Capsule (Esomeprazole Magnesium) 40 Mg Capsule.dr 40 Mg PO NOON


Fludrocortisone Acetate 0.1 Mg Tablet 0.1 Mg PO NOON


Cortef (Hydrocortisone) 5 Mg Tablet 5 Mg PO BID


Linzess (Linaclotide) 290 Mcg Capsule 290 Mcg PO PRN DAILY PRN





Patient Instructions


Patient Instuctions


Should come to the hospital daily for 


1) rociphen 1 gram i v daily


2) diflucan 200 mg I V daily


3) check BMP daily 


4) stop Fluodrocortisone











MADISON MACKAY MD Jul 7, 2017 16:36

## 2017-07-27 ENCOUNTER — HOSPITAL ENCOUNTER (OUTPATIENT)
Dept: HOSPITAL 61 - RAD | Age: 22
Discharge: HOME | End: 2017-07-27
Attending: NURSE PRACTITIONER
Payer: OTHER GOVERNMENT

## 2017-07-27 VITALS — SYSTOLIC BLOOD PRESSURE: 104 MMHG | DIASTOLIC BLOOD PRESSURE: 65 MMHG

## 2017-07-27 DIAGNOSIS — M25.551: Primary | ICD-10-CM

## 2017-07-27 LAB
CLARITY SPEC: CLEAR
COLOR FLD: COLORLESS

## 2017-07-27 PROCEDURE — 89060 EXAM SYNOVIAL FLUID CRYSTALS: CPT

## 2017-07-27 PROCEDURE — 87075 CULTR BACTERIA EXCEPT BLOOD: CPT

## 2017-07-27 PROCEDURE — 87205 SMEAR GRAM STAIN: CPT

## 2017-07-27 PROCEDURE — 36415 COLL VENOUS BLD VENIPUNCTURE: CPT

## 2017-07-27 PROCEDURE — 77002 NEEDLE LOCALIZATION BY XRAY: CPT

## 2017-07-27 PROCEDURE — 89050 BODY FLUID CELL COUNT: CPT

## 2017-07-27 PROCEDURE — 87071 CULTURE AEROBIC QUANT OTHER: CPT

## 2017-07-27 PROCEDURE — 20610 DRAIN/INJ JOINT/BURSA W/O US: CPT

## 2017-07-27 NOTE — RAD
Fluoroscopically guided right hip joint aspiration, 7/27/2017:



History: Recent sepsis, possible infection



Under local anesthesia, aseptic conditions and fluoroscopic guidance an

18-gauge spinal needle was passed into the right hip joint via an anterior

approach. No joint infusion was encountered. The joint was irrigated with a

small amount of preservative-free sterile saline with a small amount of the

irrigation fluid sent to the lab for appropriate studies. The spinal needle

was then removed and hemostasis obtained. A single fluoroscopic spot image was

recorded. 0.7 minutes of fluoroscopy time was utilized. The patient tolerated

the procedure well and left the department in good condition.

## 2017-08-04 ENCOUNTER — HOSPITAL ENCOUNTER (OUTPATIENT)
Dept: HOSPITAL 61 - RAD | Age: 22
Discharge: HOME | End: 2017-08-04
Attending: NURSE PRACTITIONER
Payer: OTHER GOVERNMENT

## 2017-08-04 VITALS — HEIGHT: 66.5 IN | BODY MASS INDEX: 15.24 KG/M2 | WEIGHT: 96 LBS

## 2017-08-04 VITALS — DIASTOLIC BLOOD PRESSURE: 70 MMHG | SYSTOLIC BLOOD PRESSURE: 108 MMHG

## 2017-08-04 DIAGNOSIS — M25.551: Primary | ICD-10-CM

## 2017-08-04 PROCEDURE — 77002 NEEDLE LOCALIZATION BY XRAY: CPT

## 2017-08-04 PROCEDURE — 20610 DRAIN/INJ JOINT/BURSA W/O US: CPT

## 2017-08-04 NOTE — RAD
EXAM: Fluoroscopically guided right hip injection. 8/4/2017



HISTORY: Fluoroscopically guided injection is requested for pain control of

the right hip.



FINDINGS: The procedure along the risks and benefits were explained to the

patient. Patient agreed to proceed and written informed consent was obtained.

A timeout procedure was performed.



The right hip was visualized fluoroscopically. The overlying skin was

sterilely prepped and infiltrated with 1% lidocaine for local anesthesia.

Under fluoroscopic guidance, a 22-gauge spinal needle was advanced into the

right hip joint space. Intra-articular positioning was confirmed with a small

injection of iodinated contrast. 5 mL Sensorcaine and 80 mg depomedrol were

injected under fluoroscopic control. Instrumentation was withdrawn and a

sterile dressing placed. There were no immediate complications. One

fluoroscopic image was obtained. Fluoroscopy time 0.6 minutes.



Following the injection, the patient reported that pain level was

significantly improved.



IMPRESSION:

Technically successful fluoroscopically guided needle right hip injection.

## 2017-08-14 ENCOUNTER — HOSPITAL ENCOUNTER (OUTPATIENT)
Dept: HOSPITAL 61 - INTRAD | Age: 22
Discharge: HOME | End: 2017-08-14
Payer: OTHER GOVERNMENT

## 2017-08-14 VITALS — DIASTOLIC BLOOD PRESSURE: 60 MMHG | SYSTOLIC BLOOD PRESSURE: 93 MMHG

## 2017-08-14 VITALS — SYSTOLIC BLOOD PRESSURE: 82 MMHG | DIASTOLIC BLOOD PRESSURE: 63 MMHG

## 2017-08-14 VITALS — SYSTOLIC BLOOD PRESSURE: 104 MMHG | DIASTOLIC BLOOD PRESSURE: 65 MMHG

## 2017-08-14 VITALS — DIASTOLIC BLOOD PRESSURE: 67 MMHG | SYSTOLIC BLOOD PRESSURE: 94 MMHG

## 2017-08-14 VITALS — DIASTOLIC BLOOD PRESSURE: 60 MMHG | SYSTOLIC BLOOD PRESSURE: 95 MMHG

## 2017-08-14 VITALS — DIASTOLIC BLOOD PRESSURE: 62 MMHG | SYSTOLIC BLOOD PRESSURE: 88 MMHG

## 2017-08-14 DIAGNOSIS — J45.909: ICD-10-CM

## 2017-08-14 DIAGNOSIS — Z88.3: ICD-10-CM

## 2017-08-14 DIAGNOSIS — K21.9: ICD-10-CM

## 2017-08-14 DIAGNOSIS — Z86.14: ICD-10-CM

## 2017-08-14 DIAGNOSIS — Z86.69: ICD-10-CM

## 2017-08-14 DIAGNOSIS — Z86.39: ICD-10-CM

## 2017-08-14 DIAGNOSIS — Z45.2: Primary | ICD-10-CM

## 2017-08-14 LAB
BASOPHILS # BLD AUTO: 0.1 X10^3/UL (ref 0–0.2)
BASOPHILS NFR BLD: 3 % (ref 0–3)
EOSINOPHIL NFR BLD: 3 % (ref 0–3)
ERYTHROCYTE [DISTWIDTH] IN BLOOD BY AUTOMATED COUNT: 13.2 % (ref 11.5–14.5)
HCT VFR BLD CALC: 41.1 % (ref 36–47)
HGB BLD-MCNC: 14 G/DL (ref 12–15.5)
INR PPP: 1 (ref 0.8–1.1)
LYMPHOCYTES # BLD: 3 X10^3/UL (ref 1–4.8)
LYMPHOCYTES NFR BLD AUTO: 62 % (ref 24–48)
MCH RBC QN AUTO: 31 PG (ref 25–35)
MCHC RBC AUTO-ENTMCNC: 34 G/DL (ref 31–37)
MCV RBC AUTO: 91 FL (ref 79–100)
MONOCYTES NFR BLD: 9 % (ref 0–9)
NEUTROPHILS NFR BLD AUTO: 24 % (ref 31–73)
PLATELET # BLD AUTO: 245 X10^3/UL (ref 140–400)
PROTHROMBIN TIME: 12.8 SEC (ref 11.7–14)
RBC # BLD AUTO: 4.52 X10^6/UL (ref 3.5–5.4)
WBC # BLD AUTO: 4.8 X10^3/UL (ref 4–11)

## 2017-08-14 PROCEDURE — 76937 US GUIDE VASCULAR ACCESS: CPT

## 2017-08-14 PROCEDURE — 99152 MOD SED SAME PHYS/QHP 5/>YRS: CPT

## 2017-08-14 PROCEDURE — 36415 COLL VENOUS BLD VENIPUNCTURE: CPT

## 2017-08-14 PROCEDURE — C1751 CATH, INF, PER/CENT/MIDLINE: HCPCS

## 2017-08-14 PROCEDURE — 87205 SMEAR GRAM STAIN: CPT

## 2017-08-14 PROCEDURE — 36590 REMOVAL TUNNELED CV CATH: CPT

## 2017-08-14 PROCEDURE — C1892 INTRO/SHEATH,FIXED,PEEL-AWAY: HCPCS

## 2017-08-14 PROCEDURE — 36569 INSJ PICC 5 YR+ W/O IMAGING: CPT

## 2017-08-14 PROCEDURE — 77001 FLUOROGUIDE FOR VEIN DEVICE: CPT

## 2017-08-14 PROCEDURE — 87070 CULTURE OTHR SPECIMN AEROBIC: CPT

## 2017-08-14 PROCEDURE — 99153 MOD SED SAME PHYS/QHP EA: CPT

## 2017-08-14 PROCEDURE — 85025 COMPLETE CBC W/AUTO DIFF WBC: CPT

## 2017-08-14 PROCEDURE — 85610 PROTHROMBIN TIME: CPT

## 2017-08-14 NOTE — PDOC
MODERATE SEDATION ASSESSMENT


RISKS/ALTERNATIVES


Risks/Alternatives


Risks and alternatives of this type of sedation and procedure discussed with:


RISK/ALTERNATIVES:  Patient





H & P ON CHART


H & P


H & P on chart and reviewed for co-morbid conditions and appropriate labs.


H&P ON CHART:  Yes





PREGNANCY STATUS


PREG STATUS ASSESSED:  Yes





MEDS/ALLERGIES REVIEWED


Meds/Allergies Reviewed


Medications and Allergies including time and route of recently administered 

narcotics and sedatives.


MEDS/ALLERGIES REVIEWED:  Yes





ASA RATING


ASA RATING:  II





AIRWAY ASSESSMENT


Airway Assessment


Airway patency, oral function limitations, presence  of caps, crowns, dentures, 

partials, and ability to extend neck assessed.


AIRWAY ASSESSMENT:  Yes





MALLAMPATI SCORE


MALLAMPATI SCORE:  II





PRE-SEDATION ASSESSMENT


PRE-SEDATION ASSESSMENT:  Yes











KERMIT NULL MD Aug 14, 2017 09:32

## 2017-08-14 NOTE — PDOC
BRIEF OPERATIVE NOTE


Pre-Op Diagnosis


PIDD


Post-Op Diagnosis


same


Procedure Performed


Port removal and PICC placement


Surgeon


[place]


Anesthesia Type:  Conscious Sedation


Specimens Obtained


Port


Findings


PICC suitable for use


Complications


No immediate











KERMIT NULL MD Aug 14, 2017 09:32

## 2017-08-14 NOTE — RAD
Procedure: Left IJ Port-A-Cath removal under fluoroscopic guidance, an

placement of right brachial PICC line with fluoroscopy and ultrasound

guidance.



Clinical Indication: 21-year-old female with autoimmune disorder requiring

recurrent IgG infusions, and suspected port infection



Sedation: Conscious sedation was administered for 37 minutes.  The patient was

monitored by a qualified independent observer throughout the time of sedation.

 Please refer to the medical record for exact doses of medications utilized to

achieve moderate sedation. 



Antibiotics: Antibiotic was administered intravenously within 1 hour of the

procedure start time. 





Exposure: Kerma-Area Product:  0.4 Gycm2 OR

                   Fluoro Time:               Images:    



Contrast: None



Sterility: All elements of maximal sterile barrier technique including the use

of a cap, mask, sterile gown, sterile gloves, large sterile sheet, appropriate

hand hygiene, and 2% chlorhexidine for cutaneous antisepsis (or acceptable

alternative antiseptic per current guidelines) were followed for this

procedure. 



Consent: 

The procedure was explained in its entirety to the patient or the patients

designated representative by a member of the treatment team, including a

discussion of the risks, benefits and commonly accepted alternatives to the

procedure, as well as the expected consequences of no therapy whatsoever.  

Discussion of the risks included, but was not limited to, those that are most

frequent and those that are rare but possibly severe or life-threatening, as

well as the possibility of unforeseen complications.



Technique and Findings: Following informed consent, the patient was prepped

and draped in the usual sterile fashion. Fluoroscopy over the left chest

revealed an intact Port-A-Cath. 1% lidocaine was used to achieve local

anesthesia over the port, and a small dermatotomy was made. Blunt dissection

techniques were then used to free the port from the pocket. The port was then

removed in one piece, and hemostasis was achieved with manual compression. The

pocket was copiously irrigated with normal saline then closed with deep

interrupted and running subcuticular 4-0 Vicryl suture. Postop fluoroscopy

demonstrated no residual radiopaque foreign body.



The right arm was then prepped and draped in the usual sterile fashion.

Ultrasound interrogation of the right arm revealed patency of the right

brachial vein. A hardcopy ultrasound image was recorded as a 21-gauge

micropuncture needle was used to gain access to this vein. The needle was

exchanged over a wire for a 5 Slovak peel-away sheath which was used to deploy

a power injectable PICC line under fluoroscopic guidance such that the distal

tip resided at the cavoatrial junction. This catheter flushed and aspirated

with ease.



Complications: No immediate



Impression:

1. Port removal as described.

2. PICC line placement as described.

## 2017-08-14 NOTE — PDOC1
History and Physical


Date of Procedure


Date of Admission








History of Present Illness


Reason for Visit


PIDD requiring lifelong IgG infusions and possible port infection





Past Medical History


Past Medical History


see nursing assessment





Current Medications


Current Medications





Current Medications


Heparin Sodium (Porcine) (Hep Lock Adult) 500 unit STK-MED ONCE IV ;  Start 8/14 /17 at 07:59;  Stop 8/14/17 at 08:00;  Status DC


Lidocaine/ Epinephrine (Xylocaine 2%-Epi 1:100,000) 20 ml STK-MED ONCE .ROUTE ;

  Start 8/14/17 at 07:59;  Stop 8/14/17 at 08:00;  Status DC


Heparin Sodium/ Sodium Chloride 500 ml @  As Directed STK-MED ONCE .ROUTE ;  

Start 8/14/17 at 07:59;  Stop 8/14/17 at 08:00;  Status DC


Vancomycin HCl 0 ml @ As Directed STK-MED ONCE .ROUTE ;  Start 8/14/17 at 07:59

;  Stop 8/14/17 at 08:00;  Status DC


Fentanyl Citrate (Fentanyl 2ml Vial) 100 mcg STK-MED ONCE .ROUTE ;  Start 8/14/ 17 at 08:36;  Stop 8/14/17 at 08:37;  Status DC


Midazolam HCl (Versed) 2 mg STK-MED ONCE .ROUTE ;  Start 8/14/17 at 08:36;  

Stop 8/14/17 at 08:37;  Status DC


Heparin Sodium/ Sodium Chloride 1,000 unit 1X  ONCE IART ;  Start 8/14/17 at 09:

00;  Stop 8/14/17 at 09:07;  Status DC


Midazolam HCl (Versed) 2 mg 1X  ONCE IV ;  Start 8/14/17 at 09:00;  Stop 8/14/ 17 at 09:07;  Status DC


Fentanyl Citrate (Fentanyl 2ml Vial) 100 mcg 1X  ONCE IV ;  Start 8/14/17 at 09:

00;  Stop 8/14/17 at 09:07;  Status DC


Lidocaine/ Epinephrine (Xylocaine 2%-Epi 1:100,000) 20 ml 1X  ONCE IJ ;  Start 8 /14/17 at 09:00;  Stop 8/14/17 at 09:07;  Status DC





Active Scripts


Active


Levaquin (Levofloxacin) 500 Mg Tablet 1 Tab PO DAILY


Reported


Diflucan (Fluconazole) 200 Mg Tablet 1 Tab PO DAILY


     daily for 2 weeks -started 8/1/17


[Vitamin B12]    IV EVERY THREE WEEKS


[Ig infusions]     EVERY 3 WEEKS


Linzess (Linaclotide) 290 Mcg Capsule 290 Mcg PO DAILY


Symbicort 160-4.5 Mcg Inhaler (Budesonide/Formoterol Fumarate) 10.2 Gm 

Hfa.aer.ad 2 Puff IH BID


Albuterol Sulfate Neb Soln (Albuterol Sulfate) 1.25 Mg/3 Ml Vial.neb 1 Vial NEB 

Q4-6HRS PRN


Zofran (Ondansetron Hcl) 4 Mg Tablet 4 Mg PO BID PRN


Phenergan (Promethazine Hcl) 25 Mg/1 Ml Ampul 25 Mg IJ PRN PRN


Reglan (Metoclopramide Hcl) 10 Mg Tablet 1 Tab PO TID PRN


Nexium Capsule (Esomeprazole Magnesium) 40 Mg Capsule.dr 1 Cap PO DAILY


Metoprolol Tartrate 25 Mg Tablet 1 Tab PO BID


[Florinef]   0.1 Mg PO DAILY


Cortef (Hydrocortisone) 5 Mg Tablet 5 Mg PO TID





Allergies


Allergies:  


Coded Allergies:  


     erythromycin base (Verified  Allergy, Intermediate, RASH, 6/29/17)





Physical Exam


Vital Signs





Vital Signs








  Date Time  Temp Pulse Resp B/P (MAP) Pulse Ox O2 Delivery O2 Flow Rate FiO2


 


8/14/17 07:40 97.0 70 16 94/67 (76) 97 Room Air  





 97.0       








Other


see nursing pre-op assessment





Assessment


Assessment


Port removal and PICC placement


Problems:  





Plan


Plan


Port removal and PICC placement











KERMIT NULL MD Aug 14, 2017 09:33

## 2017-09-06 ENCOUNTER — HOSPITAL ENCOUNTER (INPATIENT)
Dept: HOSPITAL 63 - ER | Age: 22
LOS: 3 days | Discharge: HOME | DRG: 315 | End: 2017-09-09
Attending: FAMILY MEDICINE | Admitting: FAMILY MEDICINE
Payer: OTHER GOVERNMENT

## 2017-09-06 VITALS — WEIGHT: 100.12 LBS | HEIGHT: 66.5 IN | BODY MASS INDEX: 15.9 KG/M2

## 2017-09-06 DIAGNOSIS — Z79.01: ICD-10-CM

## 2017-09-06 DIAGNOSIS — J45.909: ICD-10-CM
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
## 2017-09-06 DIAGNOSIS — T80.219A: Primary | ICD-10-CM

## 2017-09-06 DIAGNOSIS — K21.9: ICD-10-CM

## 2017-09-06 DIAGNOSIS — Z87.440: ICD-10-CM

## 2017-09-06 DIAGNOSIS — L03.113: ICD-10-CM

## 2017-09-06 DIAGNOSIS — D81.9: ICD-10-CM

## 2017-09-06 DIAGNOSIS — Z86.718: ICD-10-CM

## 2017-09-06 DIAGNOSIS — Z86.61: ICD-10-CM

## 2017-09-06 DIAGNOSIS — Z99.3: ICD-10-CM
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
## 2017-09-06 DIAGNOSIS — Z88.8: ICD-10-CM

## 2017-09-06 DIAGNOSIS — N17.9: ICD-10-CM

## 2017-09-06 DIAGNOSIS — L30.9: ICD-10-CM

## 2017-09-06 DIAGNOSIS — Z90.49: ICD-10-CM

## 2017-09-06 DIAGNOSIS — T50.995A: ICD-10-CM

## 2017-09-06 DIAGNOSIS — B95.62: ICD-10-CM

## 2017-09-06 DIAGNOSIS — E87.6: ICD-10-CM

## 2017-09-06 DIAGNOSIS — Z87.820: ICD-10-CM

## 2017-09-06 DIAGNOSIS — N14.1: ICD-10-CM

## 2017-09-06 DIAGNOSIS — T82.868A: ICD-10-CM

## 2017-09-06 DIAGNOSIS — N31.9: ICD-10-CM

## 2017-09-06 DIAGNOSIS — E27.1: ICD-10-CM

## 2017-09-06 LAB
ALBUMIN SERPL-MCNC: 3.7 G/DL (ref 3.4–5)
ALBUMIN/GLOB SERPL: 0.9 {RATIO} (ref 1–1.7)
ALP SERPL-CCNC: 72 U/L (ref 46–116)
ALT SERPL-CCNC: 13 U/L (ref 14–59)
ANION GAP SERPL CALC-SCNC: 11 MMOL/L (ref 6–14)
AST SERPL-CCNC: 14 U/L (ref 15–37)
BILIRUB SERPL-MCNC: 0.3 MG/DL (ref 0.2–1)
BUN/CREAT SERPL: 14 (ref 6–20)
CA-I SERPL ISE-MCNC: 7 MG/DL (ref 7–20)
CALCIUM SERPL-MCNC: 8.9 MG/DL (ref 8.5–10.1)
CHLORIDE SERPL-SCNC: 105 MMOL/L (ref 98–107)
CO2 SERPL-SCNC: 24 MMOL/L (ref 21–32)
CREAT SERPL-MCNC: 0.5 MG/DL (ref 0.6–1)
ERYTHROCYTE [DISTWIDTH] IN BLOOD BY AUTOMATED COUNT: 12.7 % (ref 11.5–14.5)
GFR SERPLBLD BASED ON 1.73 SQ M-ARVRAT: 155.7 ML/MIN
GLOBULIN SER-MCNC: 4.1 G/DL (ref 2.2–3.8)
GLUCOSE SERPL-MCNC: 108 MG/DL (ref 70–99)
HCT VFR BLD CALC: 40.1 % (ref 36–47)
HGB BLD-MCNC: 13.6 G/DL (ref 12–15.5)
MCH RBC QN AUTO: 30 PG (ref 25–35)
MCHC RBC AUTO-ENTMCNC: 34 G/DL (ref 31–37)
MCV RBC AUTO: 88 FL (ref 79–100)
PLATELET # BLD AUTO: 237 X10^3/UL (ref 140–400)
POTASSIUM SERPL-SCNC: 3.6 MMOL/L (ref 3.5–5.1)
PROT SERPL-MCNC: 7.8 G/DL (ref 6.4–8.2)
RBC # BLD AUTO: 4.54 X10^6/UL (ref 3.5–5.4)
SODIUM SERPL-SCNC: 140 MMOL/L (ref 136–145)
WBC # BLD AUTO: 9.9 X10^3/UL (ref 4–11)

## 2017-09-06 PROCEDURE — 71010: CPT

## 2017-09-06 PROCEDURE — 86140 C-REACTIVE PROTEIN: CPT

## 2017-09-06 PROCEDURE — 87040 BLOOD CULTURE FOR BACTERIA: CPT

## 2017-09-06 PROCEDURE — 96365 THER/PROPH/DIAG IV INF INIT: CPT

## 2017-09-06 PROCEDURE — 87641 MR-STAPH DNA AMP PROBE: CPT

## 2017-09-06 PROCEDURE — 36415 COLL VENOUS BLD VENIPUNCTURE: CPT

## 2017-09-06 PROCEDURE — 80048 BASIC METABOLIC PNL TOTAL CA: CPT

## 2017-09-06 PROCEDURE — 94640 AIRWAY INHALATION TREATMENT: CPT

## 2017-09-06 PROCEDURE — 85027 COMPLETE CBC AUTOMATED: CPT

## 2017-09-06 PROCEDURE — 83605 ASSAY OF LACTIC ACID: CPT

## 2017-09-06 PROCEDURE — 87102 FUNGUS ISOLATION CULTURE: CPT

## 2017-09-06 PROCEDURE — 80202 ASSAY OF VANCOMYCIN: CPT

## 2017-09-06 PROCEDURE — 85025 COMPLETE CBC W/AUTO DIFF WBC: CPT

## 2017-09-06 PROCEDURE — 80053 COMPREHEN METABOLIC PANEL: CPT

## 2017-09-06 PROCEDURE — 85651 RBC SED RATE NONAUTOMATED: CPT

## 2017-09-06 PROCEDURE — 87070 CULTURE OTHR SPECIMN AEROBIC: CPT

## 2017-09-06 PROCEDURE — 96368 THER/DIAG CONCURRENT INF: CPT

## 2017-09-06 PROCEDURE — 96375 TX/PRO/DX INJ NEW DRUG ADDON: CPT

## 2017-09-06 PROCEDURE — 83735 ASSAY OF MAGNESIUM: CPT

## 2017-09-06 PROCEDURE — 93971 EXTREMITY STUDY: CPT

## 2017-09-06 NOTE — ED.ADGEN
Past History


Past Medical History:  Asthma, DVT, GERD, UTI, Other


Additional Past Medical Histor:  history of TBI, history of autoimmune disorder.


Past Surgical History:  Tonsillectomy, Other


Smoking:  Non-smoker


Alcohol Use:  None


Drug Use:  None





Adult General


Chief Complaint


Chief Complaint


"  I think my pic line is infected.. " Pt  ".. she was to get another port.. at 

3 week...its past 3 weeks now.. and it looks like port is coming out.. and 

sutures are out... and now she had a fever..." ( Mother)





Miss Amanda Vazquez is a 21 yr. old female with hx of RADHA ,  and get IG infusions 

every other day.  Pt. follows with Dr Bowman and Dr. Rebollar at [facility].  Pt. Follows 

with Dr. Montgomery .   Pt. pic line Rt anti cubital has erythema,  and pus at 

site.   Sutures are gone, and some skin breakdown at suture site.





HPI


HPI





Patient is a 21 year old female who presents with above hx and complaints of 

fever and chills.  Pt. has hx. of Traumatic Brain Injury and Immunosuppression 

disorder.    Patient's right antecubital PICC line appears to be infected at 

insertion site.  PICC line has been in over 3 weeks.  No streaking or 

adenopathy in right arm.  Pt. had multiple episodes of SIRS, Sepsis, Strept 

bacteremia, Candid sepsis, Aseptic meningitis,   patient follows with Dr. Bowman 

- ID, and receives compliment every other day.   Pt. has had prior ports 

placement ,  planned new port placement this coming week.   Pt. denies travel 

or ill contacts.   Pt. is wheel chair bound secondary to Traumatic brain 

injury.   Pt. to received Benadryl prior to vanco. given. 





Pt. Hx. TBI age 8, from fall - can move Rt. arm,  but weak.  Very limited motor 

function remaining limbs.-Chronic findings.





Review of Systems


Review of Systems





Constitutional: Hx. fever or chills []


Eyes: Denies change in visual acuity, redness, or eye pain []


HENT: Denies nasal congestion or sore throat []


Respiratory: Denies cough or shortness of breath []


Cardiovascular: No additional information not addressed in HPI []


GI: Denies abdominal pain, nausea, vomiting, bloody stools or diarrhea []


: Denies dysuria or hematuria []


Musculoskeletal: Denies back pain or joint pain []


Integument: Denies rash or skin lesions []


Neurologic: Denies headache, focal weakness or sensory changes- from baseline 

TBI chronic findings


Endocrine: Denies polyuria or polydipsia []





Family History


Family History


Non- contributory





Current Medications


Current Medications





Current Medications








 Medications


  (Trade)  Dose


 Ordered  Sig/Floyd  Start Time


 Stop Time Status Last Admin


Dose Admin


 


 Ceftriaxone


 Sodium 1 gm/


 Sodium Chloride  50 ml @ 


 100 mls/hr  1X  ONCE  9/6/17 23:30


 9/6/17 23:39 DC  


 


 


 Diphenhydramine


 HCl


  (Benadryl)  50 mg  1X  ONCE  9/6/17 23:30


 9/6/17 23:49 DC 9/6/17 23:30


50 MG


 


 Nitroglycerin


  (Nitrostat)  0.4 mg  PRN Q5MIN  PRN  9/6/17 21:15


 9/6/17 23:41 DC  


 


 


 Vancomycin HCl 1


 gm/Sodium Chloride  250 ml @ 


 250 mls/hr  1X  ONCE  9/6/17 23:30


 9/7/17 00:29 DC 9/6/17 23:30


250 MLS/HR





See Nursing for home meds





Allergies


Allergies





Allergies








Coded Allergies Type Severity Reaction Last Updated Verified


 


  erythromycin base Allergy Intermediate  5/4/16 Yes


 


  I S O L A T I O N *CONTACT* Allergy Unknown  5/5/16 Yes











Physical Exam


Physical Exam





Constitutional: in mild distress, non-toxic appearance. []


HENT: Normocephalic, atraumatic, bilateral external ears normal, oropharynx 

moist, no oral exudates, nose normal. []


Eyes: PERRLA, EOMI, conjunctiva normal, no discharge. [] 


Neck: Normal range of motion, no tenderness, supple, no stridor. [] Scar.


Cardiovascular:Heart rate regular rhythm, no murmur []


Lungs & Thorax:  Bilateral breath sounds equal at apex on auscultation []Scars 

from previous ports.


Abdomen: Bowel sounds normal, soft, no tenderness, no masses, no pulsatile 

masses. [] 


Skin: Warm, dry, no erythema, no rash. [] Except Rt. anti cubital cellulitis 

findings as per HPI.


Back: No tenderness, no CVA tenderness. [] 


Extremities:  Rt anti cubital tenderness and redness, as per HPI, , no cyanosis

, no clubbing, ROM decreased due weakness, no edema. . Rt pic line site appears 

infected, pus and erythemic.  Chronic limb weakness from old TBI,  chronic 

findings - stable per family. 


Neurologic: Alert and oriented X 3, normal motor function, normal sensory 

function, no focal deficits noted. []


Psychologic: Affect normal, judgement normal, mood normal. []





Current Patient Data


Vital Signs





 Vital Signs








  Date Time  Temp Pulse Resp B/P (MAP) Pulse Ox O2 Delivery O2 Flow Rate FiO2


 


9/6/17 23:20 99.1 99 18  98 Room Air  








Lab Results





 Laboratory Tests








Test


  9/6/17


21:20


 


White Blood Count


  9.9 x10^3/uL


(4.0-11.0)


 


Red Blood Count


  4.54 x10^6/uL


(3.50-5.40)


 


Hemoglobin


  13.6 g/dL


(12.0-15.5)


 


Hematocrit


  40.1 %


(36.0-47.0)


 


Mean Corpuscular Volume


  88 fL ()


 


 


Mean Corpuscular Hemoglobin 30 pg (25-35)  


 


Mean Corpuscular Hemoglobin


Concent 34 g/dL


(31-37)


 


Red Cell Distribution Width


  12.7 %


(11.5-14.5)


 


Platelet Count


  237 x10^3/uL


(140-400)


 


Sodium Level


  140 mmol/L


(136-145)


 


Potassium Level


  3.6 mmol/L


(3.5-5.1)


 


Chloride Level


  105 mmol/L


()


 


Carbon Dioxide Level


  24 mmol/L


(21-32)


 


Anion Gap 11 (6-14)  


 


Blood Urea Nitrogen


  7 mg/dL (7-20)


 


 


Creatinine


  0.5 mg/dL


(0.6-1.0)  L


 


Estimated GFR


(Cockcroft-Gault) 155.7  


 


 


BUN/Creatinine Ratio 14 (6-20)  


 


Glucose Level


  108 mg/dL


(70-99)  H


 


Calcium Level


  8.9 mg/dL


(8.5-10.1)


 


Total Bilirubin


  0.3 mg/dL


(0.2-1.0)


 


Aspartate Amino Transferase


(AST) 14 U/L (15-37)


L


 


Alanine Aminotransferase (ALT)


  13 U/L (14-59)


L


 


Alkaline Phosphatase


  72 U/L


()


 


Total Protein


  7.8 g/dL


(6.4-8.2)


 


Albumin


  3.7 g/dL


(3.4-5.0)


 


Albumin/Globulin Ratio


  0.9 (1.0-1.7)


L











EKG


EKG


[]





Radiology/Procedures


Radiology/Procedures


CXR pending at time of admit. []





Course & Med Decision Making


Course & Med Decision Making


Pertinent Labs and Imaging studies reviewed. (See chart for details)





Discussed presentation, testing and tx. plan with  's ID- on call and Dr. Tracey.- pt to be admitted for IV antibiotic and anti fungal, until cultures.  

Line pulled and tip line sent for cultures.  Culture from and peripheral draws 

sent.   Pt. is to be started on Vancomycin,  Zosyn, and Micafungin. 





Dr. Montgomery is DrAdele. who planned for new port if infection cleared. 





[]





Final Impression


Final Impression


1. Fever and Chills


2. Hx. Immuno Suppression.-


3. Hx. Traumatic Brain Injury - quad plegic findings


Problems:  





Dragon Disclaimer


Dragon Disclaimer


This electronic medical record was generated, in whole or in part, using a 

voice recognition dictation system.











JASMIN HUNT MD Sep 6, 2017 20:44

## 2017-09-07 VITALS — SYSTOLIC BLOOD PRESSURE: 100 MMHG | DIASTOLIC BLOOD PRESSURE: 65 MMHG

## 2017-09-07 VITALS — SYSTOLIC BLOOD PRESSURE: 113 MMHG | DIASTOLIC BLOOD PRESSURE: 69 MMHG

## 2017-09-07 VITALS — SYSTOLIC BLOOD PRESSURE: 101 MMHG | DIASTOLIC BLOOD PRESSURE: 61 MMHG

## 2017-09-07 VITALS — DIASTOLIC BLOOD PRESSURE: 77 MMHG | SYSTOLIC BLOOD PRESSURE: 111 MMHG

## 2017-09-07 VITALS — SYSTOLIC BLOOD PRESSURE: 101 MMHG | DIASTOLIC BLOOD PRESSURE: 58 MMHG

## 2017-09-07 VITALS — DIASTOLIC BLOOD PRESSURE: 63 MMHG | SYSTOLIC BLOOD PRESSURE: 104 MMHG

## 2017-09-07 LAB
ALBUMIN SERPL-MCNC: 3.2 G/DL (ref 3.4–5)
ALBUMIN/GLOB SERPL: 0.9 {RATIO} (ref 1–1.7)
ALP SERPL-CCNC: 57 U/L (ref 46–116)
ALT SERPL-CCNC: 10 U/L (ref 14–59)
ANION GAP SERPL CALC-SCNC: 7 MMOL/L (ref 6–14)
AST SERPL-CCNC: 10 U/L (ref 15–37)
BASOPHILS # BLD AUTO: 0.1 X10^3/UL (ref 0–0.2)
BASOPHILS NFR BLD: 1 % (ref 0–3)
BILIRUB SERPL-MCNC: 0.4 MG/DL (ref 0.2–1)
BUN/CREAT SERPL: 14 (ref 6–20)
CA-I SERPL ISE-MCNC: 7 MG/DL (ref 7–20)
CALCIUM SERPL-MCNC: 8.6 MG/DL (ref 8.5–10.1)
CHLORIDE SERPL-SCNC: 107 MMOL/L (ref 98–107)
CO2 SERPL-SCNC: 24 MMOL/L (ref 21–32)
CREAT SERPL-MCNC: 0.5 MG/DL (ref 0.6–1)
EOSINOPHIL NFR BLD: 0.2 X10^3/UL (ref 0–0.7)
EOSINOPHIL NFR BLD: 2 % (ref 0–3)
ERYTHROCYTE [DISTWIDTH] IN BLOOD BY AUTOMATED COUNT: 12.5 % (ref 11.5–14.5)
GFR SERPLBLD BASED ON 1.73 SQ M-ARVRAT: 155.7 ML/MIN
GLOBULIN SER-MCNC: 3.6 G/DL (ref 2.2–3.8)
GLUCOSE SERPL-MCNC: 86 MG/DL (ref 70–99)
HCT VFR BLD CALC: 34.9 % (ref 36–47)
HGB BLD-MCNC: 11.8 G/DL (ref 12–15.5)
LYMPHOCYTES # BLD: 2.3 X10^3/UL (ref 1–4.8)
LYMPHOCYTES NFR BLD AUTO: 22 % (ref 24–48)
MAGNESIUM SERPL-MCNC: 1.8 MG/DL (ref 1.8–2.4)
MCH RBC QN AUTO: 30 PG (ref 25–35)
MCHC RBC AUTO-ENTMCNC: 34 G/DL (ref 31–37)
MCV RBC AUTO: 88 FL (ref 79–100)
MONO #: 1.1 X10^3/UL (ref 0–1.1)
MONOCYTES NFR BLD: 10 % (ref 0–9)
NEUT #: 7 X10^3UL (ref 1.8–7.7)
NEUTROPHILS NFR BLD AUTO: 66 % (ref 31–73)
PLATELET # BLD AUTO: 199 X10^3/UL (ref 140–400)
POTASSIUM SERPL-SCNC: 3.7 MMOL/L (ref 3.5–5.1)
PROT SERPL-MCNC: 6.8 G/DL (ref 6.4–8.2)
RBC # BLD AUTO: 3.96 X10^6/UL (ref 3.5–5.4)
SODIUM SERPL-SCNC: 138 MMOL/L (ref 136–145)
VANC TR: 7.5 MCG/ML (ref 10–20)
WBC # BLD AUTO: 10.7 X10^3/UL (ref 4–11)

## 2017-09-07 PROCEDURE — 05PYX3Z REMOVAL OF INFUSION DEVICE FROM UPPER VEIN, EXTERNAL APPROACH: ICD-10-PCS | Performed by: FAMILY MEDICINE

## 2017-09-07 RX ADMIN — PIPERACILLIN SODIUM AND TAZOBACTAM SODIUM SCH MLS/HR: 3; .375 INJECTION, POWDER, LYOPHILIZED, FOR SOLUTION INTRAVENOUS at 17:00

## 2017-09-07 RX ADMIN — VANCOMYCIN HYDROCHLORIDE PRN EACH: 1 INJECTION, POWDER, LYOPHILIZED, FOR SOLUTION INTRAVENOUS at 03:02

## 2017-09-07 RX ADMIN — DEXTROSE SCH MLS/HR: 50 INJECTION, SOLUTION INTRAVENOUS at 02:50

## 2017-09-07 RX ADMIN — DEXTROSE SCH MLS/HR: 50 INJECTION, SOLUTION INTRAVENOUS at 23:40

## 2017-09-07 RX ADMIN — FOLIC ACID SCH MLS/HR: 5 INJECTION, SOLUTION INTRAMUSCULAR; INTRAVENOUS; SUBCUTANEOUS at 12:13

## 2017-09-07 RX ADMIN — HYDROCODONE BITARTRATE AND ACETAMINOPHEN PRN TAB: 5; 325 TABLET ORAL at 17:00

## 2017-09-07 RX ADMIN — VANCOMYCIN HYDROCHLORIDE PRN EACH: 1 INJECTION, POWDER, LYOPHILIZED, FOR SOLUTION INTRAVENOUS at 02:54

## 2017-09-07 RX ADMIN — VANCOMYCIN HYDROCHLORIDE SCH MLS/HR: 750 INJECTION, POWDER, LYOPHILIZED, FOR SOLUTION INTRAVENOUS at 23:30

## 2017-09-07 RX ADMIN — METOPROLOL TARTRATE SCH MG: 25 TABLET, FILM COATED ORAL at 21:15

## 2017-09-07 RX ADMIN — ALBUTEROL SULFATE SCH MG: 108 AEROSOL, METERED RESPIRATORY (INHALATION) at 10:49

## 2017-09-07 RX ADMIN — LINACLOTIDE SCH MCG: 145 CAPSULE, GELATIN COATED ORAL at 21:16

## 2017-09-07 RX ADMIN — VANCOMYCIN HYDROCHLORIDE SCH MLS/HR: 750 INJECTION, POWDER, LYOPHILIZED, FOR SOLUTION INTRAVENOUS at 15:09

## 2017-09-07 RX ADMIN — HYDROCODONE BITARTRATE AND ACETAMINOPHEN PRN TAB: 5; 325 TABLET ORAL at 11:43

## 2017-09-07 RX ADMIN — HYDROCORTISONE SODIUM SUCCINATE SCH MG: 100 INJECTION, POWDER, FOR SOLUTION INTRAMUSCULAR; INTRAVENOUS at 15:10

## 2017-09-07 RX ADMIN — VANCOMYCIN HYDROCHLORIDE PRN EACH: 1 INJECTION, POWDER, LYOPHILIZED, FOR SOLUTION INTRAVENOUS at 02:59

## 2017-09-07 RX ADMIN — VANCOMYCIN HYDROCHLORIDE SCH MLS/HR: 750 INJECTION, POWDER, LYOPHILIZED, FOR SOLUTION INTRAVENOUS at 08:14

## 2017-09-07 RX ADMIN — ALBUTEROL SULFATE SCH MG: 108 AEROSOL, METERED RESPIRATORY (INHALATION) at 19:59

## 2017-09-07 RX ADMIN — PIPERACILLIN SODIUM AND TAZOBACTAM SODIUM SCH MLS/HR: 3; .375 INJECTION, POWDER, LYOPHILIZED, FOR SOLUTION INTRAVENOUS at 11:27

## 2017-09-07 RX ADMIN — METOPROLOL TARTRATE SCH MG: 25 TABLET, FILM COATED ORAL at 11:25

## 2017-09-07 RX ADMIN — HYDROCORTISONE SODIUM SUCCINATE SCH MG: 100 INJECTION, POWDER, FOR SOLUTION INTRAMUSCULAR; INTRAVENOUS at 21:15

## 2017-09-07 RX ADMIN — ALBUTEROL SULFATE SCH MG: 108 AEROSOL, METERED RESPIRATORY (INHALATION) at 15:20

## 2017-09-07 RX ADMIN — ENOXAPARIN SODIUM SCH MG: 100 INJECTION SUBCUTANEOUS at 11:26

## 2017-09-07 RX ADMIN — PIPERACILLIN SODIUM AND TAZOBACTAM SODIUM SCH MLS/HR: 3; .375 INJECTION, POWDER, LYOPHILIZED, FOR SOLUTION INTRAVENOUS at 05:50

## 2017-09-07 RX ADMIN — PROMETHAZINE HYDROCHLORIDE PRN MG: 25 TABLET ORAL at 23:46

## 2017-09-07 RX ADMIN — PROMETHAZINE HYDROCHLORIDE PRN MG: 25 TABLET ORAL at 11:42

## 2017-09-07 RX ADMIN — BUDESONIDE SCH MG: 0.5 SUSPENSION RESPIRATORY (INHALATION) at 19:59

## 2017-09-07 RX ADMIN — HYDROCORTISONE SODIUM SUCCINATE SCH MG: 100 INJECTION, POWDER, FOR SOLUTION INTRAMUSCULAR; INTRAVENOUS at 08:14

## 2017-09-07 RX ADMIN — HYDROCODONE BITARTRATE AND ACETAMINOPHEN PRN TAB: 5; 325 TABLET ORAL at 23:47

## 2017-09-07 RX ADMIN — ALBUTEROL SULFATE SCH MG: 108 AEROSOL, METERED RESPIRATORY (INHALATION) at 10:48

## 2017-09-07 RX ADMIN — PANTOPRAZOLE SODIUM SCH MG: 40 TABLET, DELAYED RELEASE ORAL at 11:26

## 2017-09-07 NOTE — RAD
Portable chest, 9/7/2017:



History: Fever and chills



Comparison is made to a study from 6/29/2017. A left Port-A-Cath has been

removed. The heart size and pulmonary vascularity are normal. No pulmonary

infiltrates are seen. There is no evidence of pleural fluid.



IMPRESSION: No acute cardiopulmonary abnormality is detected.

## 2017-09-07 NOTE — ACF
Admit Criteria Forms


                              SEPSIS and Other Febrile Illness Without Focal 

Infection


                           


                                                                               

  (Place 'X' for any and all applicable criteria):





 Admission to inpatient status for two midnights or more is indicated for ANY 

ONE of the following (1)(2)(3):


[ ] I.       Bacteremia 


[X]II.       Suspected or identified specific infection requiring 

hospitalization (eg, meningitis, endocarditis) 


[ ]III.      Hemodynamic instability 


[ ]IV.     Temperature > 104.9 0F (40.5 0C) (oral)       


[ ]V.      Core (rectal) temperature < 95 0F (35 0C) (eg, thought to be due to 

infection)    


[ ]VI.     Altered mental status that is severe or persistent                   


[ ]VII.    Failure or unavailability of outpatient antimicrobial treatment  


[ ]VIII.   Hypoxemia   


[ ]IX.     Seizures


[ ]X.       New coagulopathy (eg, reduced platelet count consistent with 

disseminated intravascular  coagulation) 


[ ]XI.      Inpatient admission required [B] rather than observation care 

because of 1 or more of the following 





    1)  Tachypnea not responsive to outpatient or observation treatment    


    2)   Metabolic disorder (eg, hypoglycemia, hyperglycemia, metabolic acidosis

) that persists despite outpatient and observation care treatment    


    3)   Evidence of end-organ dysfunction (eg, rising creatinine, myocardial 

ischemia, rising liver function tests) that is severe or persists despite 

observation care treatment    


    4)   Temperature > 103.1 0F (39.5 0C) (oral) that is not responsive to 

observation care treatment    


    5)   Dehydration that is severe or persistent 


    6)   Parenteral antimicrobial regimen that must be implemented on inpatient 

basis (eg, infusion or monitoring needs beyond capabilities of outpatient 

parenteral therapy)    


    7)   Strict or protective (eg, laminar flow) isolation 


    8)   Other condition, treatment or monitoring requiring inpatient admission 





Extended stay beyond goal length of stay may be needed for(1)(3)





[ ]a)      Persistent Hypotension


[ ]b)     Positive blood cultures


[ ]c)     Lack of improvement on antimicrobial treatment (eg, continued fever) 


[ ]d)     Active comorbid illness (eg, heart failure, renal failure)


[ ]e)     High-risk febrile neutropenia 


[ ]f)      Insufficient oral intake    


[ ]g)     insufficient oral intake 


 








The original [employer] content created by [employer] 

[employer] has been revised. 


The portions of the content which have been revised are identified through the 

use of italic text, and [facility] [employer] 


has neither reviewed nor approved the modified material. All other unmodified 

content is copyright  [facility].





Please see references footnoted in the original [facility] edition 

2015











JULIANA POLK Sep 7, 2017 08:01

## 2017-09-07 NOTE — HP
ADMIT DATE:  09/07/2017



REASON FOR ADMISSION:  This is a 21-year-old female, who started having problems

with her PICC line on Tuesday, it started hurting.  Mother noted the stitches

had come out.  Wednesday called Dr. Bowman, the Infectious Disease doctor noted

that there was some oozing coming from there.  She also had a fever of 100.5 at

home.  Presented to the Emergency Room and PICC line was pulled and she was

admitted for possible cellulitis.



PAST MEDICAL HISTORY:  Common variable immunodeficiency, bacteremia, sepsis, E.

coli urinary tract infections, traumatic brain injury, lower extremity

neuropathy and motor dysfunction, narcotic-induced constipation, [place]'s

disease, neurogenic bladder, B12 deficiency, adrenal insufficiency.



ALLERGIES:  ERYTHROMYCIN BASE



MEDICATIONS:  Reviewed with her mother and are available on the MAR.



SOCIAL HISTORY:  As stated, she had a traumatic brain injury in high school and

is confined to a wheelchair.  She lives at home with her mother.  She has a

service dog.



REVIEW OF SYSTEMS:  As per HPI, pain in the right upper arm.



OBJECTIVE:

VITAL SIGNS:  Temperature yesterday evening was 100.2, this morning 99, pulse

92, blood pressure 101/61, pulse ox 98% on room air.  Height 66-1/2 inches and

weight 98.38 pounds.

GENERAL:  This is a 21-year-old, in no acute distress.

HEENT:  Her eyes look good.  Nose was patent.  Throat was clear.  Tongue was

moist.

NECK:  Supple.

LUNGS:  Clear.

CARDIOVASCULAR:  Regular rhythm and rate.

ABDOMEN:  Soft, nontender.

EXTREMITIES:  Without edema.  The right arm where the PICC line was has some

external dermatitis with a little bit of drainage.  The arm itself was slightly

swollen.



The chest x-ray is negative.



LABORATORY DATA:  White blood cell count was 10.7.  Sed rate is 33.  Hemoglobin

11.8, hematocrit 34.9.  Chemistry normal except for albumin of 3.2, but that is

after IV fluids.  Culture of PICC line pending.  Ultrasound of right upper arm

pending.



ASSESSMENT:

1.  Cellulitis of the right upper arm.

2.  Rule out ultrasound pending for deep venous thrombosis of the right upper

venous system.

3.  Combined immunodeficiency.

4.  Traumatic brain injury.

5.  History of bacteremia with fungemia.



PLAN:  Emergency Room doctor spoke with ID and will cover her with oral

antibiotics for now until the culture comes back.  ____ rule out DVT prophylaxis

currently until the ultrasound comes back.





______________________________

LIZ OVALLES DO



DR:  SAAD/clare  JOB#:  [id number] / [id number]

DD:  09/07/2017 14:03  DT:  09/07/2017 16:56

## 2017-09-07 NOTE — RAD
Right upper extremity venous duplex ultrasound 9/7/2017



Indication: Right upper extremity swelling. Recent PICC line removal.



Discussion: Ultrasound evaluation of the right upper extremity is performed

including color Doppler imaging spectral analysis. Evaluation of venous

compressibility was also performed. There is partially occlusive thrombus

within the right basilic vein (a superficial vein). Brachial veins, axillary

vein, visualized subclavian vein are patent. Cephalic vein is patent.



Impression: Partially occlusive, superficial venous thrombosis involving the

right basilic vein.

## 2017-09-08 VITALS — DIASTOLIC BLOOD PRESSURE: 69 MMHG | SYSTOLIC BLOOD PRESSURE: 110 MMHG

## 2017-09-08 VITALS — SYSTOLIC BLOOD PRESSURE: 123 MMHG | DIASTOLIC BLOOD PRESSURE: 80 MMHG

## 2017-09-08 VITALS — SYSTOLIC BLOOD PRESSURE: 110 MMHG | DIASTOLIC BLOOD PRESSURE: 69 MMHG

## 2017-09-08 VITALS — SYSTOLIC BLOOD PRESSURE: 97 MMHG | DIASTOLIC BLOOD PRESSURE: 58 MMHG

## 2017-09-08 VITALS — SYSTOLIC BLOOD PRESSURE: 146 MMHG | DIASTOLIC BLOOD PRESSURE: 87 MMHG

## 2017-09-08 LAB
ALBUMIN SERPL-MCNC: 3.2 G/DL (ref 3.4–5)
ALBUMIN/GLOB SERPL: 0.9 {RATIO} (ref 1–1.7)
ALP SERPL-CCNC: 61 U/L (ref 46–116)
ALT SERPL-CCNC: 11 U/L (ref 14–59)
ANION GAP SERPL CALC-SCNC: 8 MMOL/L (ref 6–14)
AST SERPL-CCNC: 11 U/L (ref 15–37)
BASOPHILS # BLD AUTO: 0 X10^3/UL (ref 0–0.2)
BASOPHILS NFR BLD: 0 % (ref 0–3)
BILIRUB SERPL-MCNC: 0.3 MG/DL (ref 0.2–1)
BUN/CREAT SERPL: 8 (ref 6–20)
CA-I SERPL ISE-MCNC: 5 MG/DL (ref 7–20)
CALCIUM SERPL-MCNC: 8.3 MG/DL (ref 8.5–10.1)
CHLORIDE SERPL-SCNC: 109 MMOL/L (ref 98–107)
CO2 SERPL-SCNC: 24 MMOL/L (ref 21–32)
CREAT SERPL-MCNC: 0.6 MG/DL (ref 0.6–1)
EOSINOPHIL NFR BLD: 0 % (ref 0–3)
EOSINOPHIL NFR BLD: 0 X10^3/UL (ref 0–0.7)
ERYTHROCYTE [DISTWIDTH] IN BLOOD BY AUTOMATED COUNT: 12.7 % (ref 11.5–14.5)
GFR SERPLBLD BASED ON 1.73 SQ M-ARVRAT: 126.2 ML/MIN
GLOBULIN SER-MCNC: 3.5 G/DL (ref 2.2–3.8)
GLUCOSE SERPL-MCNC: 122 MG/DL (ref 70–99)
HCT VFR BLD CALC: 34.9 % (ref 36–47)
HGB BLD-MCNC: 12.2 G/DL (ref 12–15.5)
LYMPHOCYTES # BLD: 1.3 X10^3/UL (ref 1–4.8)
LYMPHOCYTES NFR BLD AUTO: 13 % (ref 24–48)
MAGNESIUM SERPL-MCNC: 1.8 MG/DL (ref 1.8–2.4)
MCH RBC QN AUTO: 31 PG (ref 25–35)
MCHC RBC AUTO-ENTMCNC: 35 G/DL (ref 31–37)
MCV RBC AUTO: 89 FL (ref 79–100)
MONO #: 0.5 X10^3/UL (ref 0–1.1)
MONOCYTES NFR BLD: 5 % (ref 0–9)
NEUT #: 8.1 X10^3UL (ref 1.8–7.7)
NEUTROPHILS NFR BLD AUTO: 82 % (ref 31–73)
PLATELET # BLD AUTO: 204 X10^3/UL (ref 140–400)
POTASSIUM SERPL-SCNC: 3.2 MMOL/L (ref 3.5–5.1)
PROT SERPL-MCNC: 6.7 G/DL (ref 6.4–8.2)
RBC # BLD AUTO: 3.92 X10^6/UL (ref 3.5–5.4)
SODIUM SERPL-SCNC: 141 MMOL/L (ref 136–145)
WBC # BLD AUTO: 9.9 X10^3/UL (ref 4–11)

## 2017-09-08 RX ADMIN — HYDROCORTISONE SODIUM SUCCINATE SCH MG: 100 INJECTION, POWDER, FOR SOLUTION INTRAMUSCULAR; INTRAVENOUS at 05:25

## 2017-09-08 RX ADMIN — BUDESONIDE SCH MG: 0.5 SUSPENSION RESPIRATORY (INHALATION) at 09:00

## 2017-09-08 RX ADMIN — HYDROCODONE BITARTRATE AND ACETAMINOPHEN PRN TAB: 5; 325 TABLET ORAL at 19:07

## 2017-09-08 RX ADMIN — POTASSIUM CHLORIDE SCH MEQ: 1500 TABLET, EXTENDED RELEASE ORAL at 12:21

## 2017-09-08 RX ADMIN — POTASSIUM CHLORIDE SCH MEQ: 1500 TABLET, EXTENDED RELEASE ORAL at 16:21

## 2017-09-08 RX ADMIN — METOPROLOL TARTRATE SCH MG: 25 TABLET, FILM COATED ORAL at 09:31

## 2017-09-08 RX ADMIN — PIPERACILLIN SODIUM AND TAZOBACTAM SODIUM SCH MLS/HR: 3; .375 INJECTION, POWDER, LYOPHILIZED, FOR SOLUTION INTRAVENOUS at 17:36

## 2017-09-08 RX ADMIN — VANCOMYCIN HYDROCHLORIDE SCH MLS/HR: 1 INJECTION, POWDER, LYOPHILIZED, FOR SOLUTION INTRAVENOUS at 08:24

## 2017-09-08 RX ADMIN — HYDROCODONE BITARTRATE AND ACETAMINOPHEN PRN TAB: 5; 325 TABLET ORAL at 23:59

## 2017-09-08 RX ADMIN — PIPERACILLIN SODIUM AND TAZOBACTAM SODIUM SCH MLS/HR: 3; .375 INJECTION, POWDER, LYOPHILIZED, FOR SOLUTION INTRAVENOUS at 23:59

## 2017-09-08 RX ADMIN — HYDROCORTISONE SODIUM SUCCINATE SCH MG: 100 INJECTION, POWDER, FOR SOLUTION INTRAMUSCULAR; INTRAVENOUS at 14:50

## 2017-09-08 RX ADMIN — VANCOMYCIN HYDROCHLORIDE SCH MLS/HR: 1 INJECTION, POWDER, LYOPHILIZED, FOR SOLUTION INTRAVENOUS at 16:22

## 2017-09-08 RX ADMIN — METOPROLOL TARTRATE SCH MG: 25 TABLET, FILM COATED ORAL at 21:22

## 2017-09-08 RX ADMIN — ALBUTEROL SULFATE SCH MG: 108 AEROSOL, METERED RESPIRATORY (INHALATION) at 15:54

## 2017-09-08 RX ADMIN — VANCOMYCIN HYDROCHLORIDE SCH MLS/HR: 1 INJECTION, POWDER, LYOPHILIZED, FOR SOLUTION INTRAVENOUS at 01:11

## 2017-09-08 RX ADMIN — PIPERACILLIN SODIUM AND TAZOBACTAM SODIUM SCH MLS/HR: 3; .375 INJECTION, POWDER, LYOPHILIZED, FOR SOLUTION INTRAVENOUS at 00:40

## 2017-09-08 RX ADMIN — HYDROCORTISONE SODIUM SUCCINATE SCH MG: 100 INJECTION, POWDER, FOR SOLUTION INTRAMUSCULAR; INTRAVENOUS at 21:22

## 2017-09-08 RX ADMIN — ALBUTEROL SULFATE SCH MG: 108 AEROSOL, METERED RESPIRATORY (INHALATION) at 04:21

## 2017-09-08 RX ADMIN — ALBUTEROL SULFATE SCH MG: 108 AEROSOL, METERED RESPIRATORY (INHALATION) at 09:37

## 2017-09-08 RX ADMIN — FOLIC ACID SCH MLS/HR: 5 INJECTION, SOLUTION INTRAMUSCULAR; INTRAVENOUS; SUBCUTANEOUS at 09:32

## 2017-09-08 RX ADMIN — PIPERACILLIN SODIUM AND TAZOBACTAM SODIUM SCH MLS/HR: 3; .375 INJECTION, POWDER, LYOPHILIZED, FOR SOLUTION INTRAVENOUS at 12:21

## 2017-09-08 RX ADMIN — HYDROCODONE BITARTRATE AND ACETAMINOPHEN PRN TAB: 5; 325 TABLET ORAL at 12:44

## 2017-09-08 RX ADMIN — DEXTROSE SCH MLS/HR: 50 INJECTION, SOLUTION INTRAVENOUS at 22:56

## 2017-09-08 RX ADMIN — PROMETHAZINE HYDROCHLORIDE PRN MG: 25 TABLET ORAL at 23:17

## 2017-09-08 RX ADMIN — VANCOMYCIN HYDROCHLORIDE PRN EACH: 1 INJECTION, POWDER, LYOPHILIZED, FOR SOLUTION INTRAVENOUS at 00:37

## 2017-09-08 RX ADMIN — LINACLOTIDE SCH MCG: 145 CAPSULE, GELATIN COATED ORAL at 21:21

## 2017-09-08 RX ADMIN — PANTOPRAZOLE SODIUM SCH MG: 40 TABLET, DELAYED RELEASE ORAL at 12:21

## 2017-09-08 RX ADMIN — PROMETHAZINE HYDROCHLORIDE PRN MG: 25 TABLET ORAL at 12:43

## 2017-09-08 RX ADMIN — BUDESONIDE SCH MG: 0.5 SUSPENSION RESPIRATORY (INHALATION) at 21:22

## 2017-09-08 RX ADMIN — ALBUTEROL SULFATE SCH MG: 108 AEROSOL, METERED RESPIRATORY (INHALATION) at 21:22

## 2017-09-08 RX ADMIN — ENOXAPARIN SODIUM SCH MG: 100 INJECTION SUBCUTANEOUS at 12:21

## 2017-09-08 RX ADMIN — PIPERACILLIN SODIUM AND TAZOBACTAM SODIUM SCH MLS/HR: 3; .375 INJECTION, POWDER, LYOPHILIZED, FOR SOLUTION INTRAVENOUS at 05:26

## 2017-09-09 VITALS — DIASTOLIC BLOOD PRESSURE: 89 MMHG | SYSTOLIC BLOOD PRESSURE: 137 MMHG

## 2017-09-09 VITALS — SYSTOLIC BLOOD PRESSURE: 135 MMHG | DIASTOLIC BLOOD PRESSURE: 89 MMHG

## 2017-09-09 LAB
ANION GAP SERPL CALC-SCNC: 10 MMOL/L (ref 6–14)
CA-I SERPL ISE-MCNC: 9 MG/DL (ref 7–20)
CALCIUM SERPL-MCNC: 8.3 MG/DL (ref 8.5–10.1)
CHLORIDE SERPL-SCNC: 111 MMOL/L (ref 98–107)
CO2 SERPL-SCNC: 20 MMOL/L (ref 21–32)
CREAT SERPL-MCNC: 1.2 MG/DL (ref 0.6–1)
ERYTHROCYTE [DISTWIDTH] IN BLOOD BY AUTOMATED COUNT: 12.6 % (ref 11.5–14.5)
GFR SERPLBLD BASED ON 1.73 SQ M-ARVRAT: 56.7 ML/MIN
GLUCOSE SERPL-MCNC: 129 MG/DL (ref 70–99)
HCT VFR BLD CALC: 30.5 % (ref 36–47)
HGB BLD-MCNC: 10.6 G/DL (ref 12–15.5)
MCH RBC QN AUTO: 31 PG (ref 25–35)
MCHC RBC AUTO-ENTMCNC: 35 G/DL (ref 31–37)
MCV RBC AUTO: 89 FL (ref 79–100)
PLATELET # BLD AUTO: 160 X10^3/UL (ref 140–400)
POTASSIUM SERPL-SCNC: 3.2 MMOL/L (ref 3.5–5.1)
RBC # BLD AUTO: 3.45 X10^6/UL (ref 3.5–5.4)
SODIUM SERPL-SCNC: 141 MMOL/L (ref 136–145)
VANC TR: 27.6 MCG/ML (ref 10–20)
WBC # BLD AUTO: 10.2 X10^3/UL (ref 4–11)

## 2017-09-09 RX ADMIN — POTASSIUM CHLORIDE SCH MEQ: 1500 TABLET, EXTENDED RELEASE ORAL at 12:00

## 2017-09-09 RX ADMIN — PANTOPRAZOLE SODIUM SCH MG: 40 TABLET, DELAYED RELEASE ORAL at 11:58

## 2017-09-09 RX ADMIN — POTASSIUM CHLORIDE SCH MEQ: 1500 TABLET, EXTENDED RELEASE ORAL at 07:55

## 2017-09-09 RX ADMIN — PROMETHAZINE HYDROCHLORIDE PRN MG: 25 TABLET ORAL at 11:58

## 2017-09-09 RX ADMIN — PIPERACILLIN SODIUM AND TAZOBACTAM SODIUM SCH MLS/HR: 3; .375 INJECTION, POWDER, LYOPHILIZED, FOR SOLUTION INTRAVENOUS at 05:18

## 2017-09-09 RX ADMIN — METOPROLOL TARTRATE SCH MG: 25 TABLET, FILM COATED ORAL at 07:55

## 2017-09-09 RX ADMIN — ALBUTEROL SULFATE SCH MG: 108 AEROSOL, METERED RESPIRATORY (INHALATION) at 05:21

## 2017-09-09 RX ADMIN — HYDROCORTISONE SODIUM SUCCINATE SCH MG: 100 INJECTION, POWDER, FOR SOLUTION INTRAMUSCULAR; INTRAVENOUS at 05:17

## 2017-09-09 RX ADMIN — PROMETHAZINE HYDROCHLORIDE PRN MG: 25 TABLET ORAL at 05:35

## 2017-09-09 RX ADMIN — VANCOMYCIN HYDROCHLORIDE PRN EACH: 1 INJECTION, POWDER, LYOPHILIZED, FOR SOLUTION INTRAVENOUS at 08:17

## 2017-09-09 RX ADMIN — ALBUTEROL SULFATE SCH MG: 108 AEROSOL, METERED RESPIRATORY (INHALATION) at 11:43

## 2017-09-09 RX ADMIN — HYDROCODONE BITARTRATE AND ACETAMINOPHEN PRN TAB: 5; 325 TABLET ORAL at 08:07

## 2017-09-09 RX ADMIN — VANCOMYCIN HYDROCHLORIDE SCH MLS/HR: 1 INJECTION, POWDER, LYOPHILIZED, FOR SOLUTION INTRAVENOUS at 07:47

## 2017-09-09 RX ADMIN — BUDESONIDE SCH MG: 0.5 SUSPENSION RESPIRATORY (INHALATION) at 11:44

## 2017-09-09 RX ADMIN — HYDROCODONE BITARTRATE AND ACETAMINOPHEN PRN TAB: 5; 325 TABLET ORAL at 11:58

## 2017-09-09 RX ADMIN — HYDROCODONE BITARTRATE AND ACETAMINOPHEN PRN TAB: 5; 325 TABLET ORAL at 09:26

## 2017-09-09 RX ADMIN — VANCOMYCIN HYDROCHLORIDE SCH MLS/HR: 1 INJECTION, POWDER, LYOPHILIZED, FOR SOLUTION INTRAVENOUS at 00:18

## 2017-09-09 NOTE — DS
DATE OF DISCHARGE:  09/09/2017



DISCHARGE DIAGNOSES:

1.  Peripherally inserted central catheter line tip infection with

Staphylococcus aureus.

2.  Acute kidney injury secondary to nephrotoxic drugs.

3.  Deep venous thrombosis prophylaxis.

4.  Right superficial brachial vein thrombosis secondary to peripherally

inserted central catheter line.

5.  Chronic methicillin-resistant Staphylococcus aureus.

6.  Combined immune deficiency, chronic.

7.  Cellulitis surrounding the area of the peripherally inserted central

catheter on the right arm.



HOSPITAL COURSE:  A 21-year-old female admitted with infected PICC line, which

was removed.  The area had a mild-to-moderate cellulitis, dermatitis surrounding

the area.  She was seen by wound, also she received triple antibiotic therapy,

this was somewhat nephrotoxic and her creatinine had bumped up in spite of

adequate fluid resuscitation.  BRANDON of the staph came back on the day of

discharge.  She grew out Staph aureus, it was resistant to penicillin.  I

discussed with Dr. Bowman, her Infectious Disease doctor and recommended that she

be discharged on p.o. Keflex.  She has an appointment with him on Monday.  She

will be getting a PICC line at some point.



OBJECTIVE PHYSICAL EXAMINATION:

VITAL SIGNS:  On the day of discharge, blood pressure 135/89, pulse 79,

respirations 20, pulse ox 97% on room air, temperature 98.4.

GENERAL:  Color is pale.  She looks well hydrated.

HEENT:  Tongue was moist.

NECK:  Supple.

LUNGS:  Clear.

CARDIOVASCULAR:  Regular rhythm and rate.

ABDOMEN:  Soft, mild midepigastric tenderness and nausea.

EXTREMITIES:  Without edema.



PLAN:  Discussed in detail with her mother.  I did discuss that the external

lines are not a good idea with all of her problems.  She misunderstood what I

said and thought that Dr. Bowman was saying that she could have a Port-A-Cath.  I

reassured her.  She did become very upset.  She will be discharged home on

Keflex.  I have given her a 10-day prescription, she might check with Dr. Bowman

in the length of prescription.  We will also await the other cultures.  Mother

will treat her hypokalemia at home and she has p.o. potassium at home and gave

her a list of high potassium foods.





______________________________

LIZ OVALLES DO



DR:  SAAD/clare  JOB#:  [id number] / [id number]

DD:  09/09/2017 12:11  DT:  09/09/2017 14:18

## 2017-09-09 NOTE — PN
DATE:  09/08/2017



This is a late progress note.



PROBLEMS:

1.  Cellulitis of the right upper arm.

2.  PICC line tip catheter growing out Staphylococcus aureus - Noah pending.

2.  Superficial thrombosis of the right cephalic vein.

3.  Combined immunodeficiency.

4.  Hypokalemia.

5.  Methicillin-resistant Staphylococcus aureus positive, this is chronic.

6.  Traumatic brain injury.

7.  History of bacteremia.

8.  [place]'s disease.



SUBJECTIVE:  A 21-year-old female admitted with an infected PICC line.  So far,

her blood cultures have been negative, the PICC line tip is growing out Staph

aureus.  Mics are pending.  Fungal culture is pending.  She is little

hypokalemic today, that will need to be replaced.  She continues with her IV

fluids and is slightly nauseated, otherwise, is doing fairly well.



OBJECTIVE:

VITAL SIGNS:  Blood pressure 110/69, pulse 80, respirations 20, temperature

98.5, pulse ox is 96% on room air.

GENERAL:  Color is pale.

HEENT:  Her tongue is moist.  Throat is clear.

LUNGS:  Clear.

CARDIOVASCULAR:  Regular rhythm and rate.

ABDOMEN:  Soft, nontender.

EXTREMITIES:  Without edema.



LABORATORY DATA:  Hemoglobin 12.2, hematocrit 34.9.  Chemistry:  Potassium 3.2

yesterday.  Her albumin is 3.2, it was 3.7 on admission, probably due to

dilutional.



PLAN:  Replace the potassium.  She remains on vancomycin, Zosyn, and micafungin

therapy.  PICC line has been removed.  She is on stress dose of her

hydrocortisone and we will treat her nausea with Phenergan.





______________________________

LIZ OVALLES DO



DR:  SAAD/clare  JOB#:  [id number] / [id number]

DD:  09/09/2017 12:07  DT:  09/09/2017 21:40

## 2017-09-27 ENCOUNTER — HOSPITAL ENCOUNTER (OUTPATIENT)
Dept: HOSPITAL 61 - INTRAD | Age: 22
Discharge: HOME | End: 2017-09-27
Payer: OTHER GOVERNMENT

## 2017-09-27 VITALS — DIASTOLIC BLOOD PRESSURE: 62 MMHG | SYSTOLIC BLOOD PRESSURE: 96 MMHG

## 2017-09-27 VITALS — SYSTOLIC BLOOD PRESSURE: 106 MMHG | DIASTOLIC BLOOD PRESSURE: 62 MMHG

## 2017-09-27 VITALS — DIASTOLIC BLOOD PRESSURE: 60 MMHG | SYSTOLIC BLOOD PRESSURE: 115 MMHG

## 2017-09-27 VITALS — BODY MASS INDEX: 15.75 KG/M2 | HEIGHT: 66 IN | WEIGHT: 98 LBS

## 2017-09-27 VITALS — SYSTOLIC BLOOD PRESSURE: 107 MMHG | DIASTOLIC BLOOD PRESSURE: 72 MMHG

## 2017-09-27 VITALS — DIASTOLIC BLOOD PRESSURE: 62 MMHG | SYSTOLIC BLOOD PRESSURE: 101 MMHG

## 2017-09-27 VITALS — SYSTOLIC BLOOD PRESSURE: 99 MMHG | DIASTOLIC BLOOD PRESSURE: 63 MMHG

## 2017-09-27 DIAGNOSIS — Z88.1: ICD-10-CM

## 2017-09-27 DIAGNOSIS — Z79.01: ICD-10-CM

## 2017-09-27 DIAGNOSIS — K21.9: ICD-10-CM

## 2017-09-27 DIAGNOSIS — J45.909: ICD-10-CM

## 2017-09-27 DIAGNOSIS — D84.9: Primary | ICD-10-CM

## 2017-09-27 LAB
BASOPHILS # BLD AUTO: 0.1 X10^3/UL (ref 0–0.2)
BASOPHILS NFR BLD: 2 % (ref 0–3)
EOSINOPHIL NFR BLD: 1 % (ref 0–3)
ERYTHROCYTE [DISTWIDTH] IN BLOOD BY AUTOMATED COUNT: 13.1 % (ref 11.5–14.5)
HCT VFR BLD CALC: 37.6 % (ref 36–47)
HGB BLD-MCNC: 12.7 G/DL (ref 12–15.5)
INR PPP: 1.1 (ref 0.8–1.1)
LYMPHOCYTES # BLD: 2.3 X10^3/UL (ref 1–4.8)
LYMPHOCYTES NFR BLD AUTO: 43 % (ref 24–48)
MCH RBC QN AUTO: 30 PG (ref 25–35)
MCHC RBC AUTO-ENTMCNC: 34 G/DL (ref 31–37)
MCV RBC AUTO: 89 FL (ref 79–100)
MONOCYTES NFR BLD: 7 % (ref 0–9)
NEUTROPHILS NFR BLD AUTO: 47 % (ref 31–73)
PLATELET # BLD AUTO: 332 X10^3/UL (ref 140–400)
PROTHROMBIN TIME: 13.3 SEC (ref 11.7–14)
RBC # BLD AUTO: 4.23 X10^6/UL (ref 3.5–5.4)
WBC # BLD AUTO: 5.2 X10^3/UL (ref 4–11)

## 2017-09-27 PROCEDURE — 77001 FLUOROGUIDE FOR VEIN DEVICE: CPT

## 2017-09-27 PROCEDURE — C1751 CATH, INF, PER/CENT/MIDLINE: HCPCS

## 2017-09-27 PROCEDURE — C1894 INTRO/SHEATH, NON-LASER: HCPCS

## 2017-09-27 PROCEDURE — C1892 INTRO/SHEATH,FIXED,PEEL-AWAY: HCPCS

## 2017-09-27 PROCEDURE — 85610 PROTHROMBIN TIME: CPT

## 2017-09-27 PROCEDURE — 85025 COMPLETE CBC W/AUTO DIFF WBC: CPT

## 2017-09-27 PROCEDURE — 36561 INSERT TUNNELED CV CATH: CPT

## 2017-09-27 PROCEDURE — C1769 GUIDE WIRE: HCPCS

## 2017-09-27 PROCEDURE — 99153 MOD SED SAME PHYS/QHP EA: CPT

## 2017-09-27 PROCEDURE — 99152 MOD SED SAME PHYS/QHP 5/>YRS: CPT

## 2017-09-27 PROCEDURE — 76937 US GUIDE VASCULAR ACCESS: CPT

## 2017-09-27 PROCEDURE — 36415 COLL VENOUS BLD VENIPUNCTURE: CPT

## 2017-09-27 NOTE — RAD
Procedure: Ultrasound and fluoroscopically guided placement of right internal

jugular power port..  9/27/2017 11:10 AM



Clinical Indication:  Need for long-term central venous access



Sedation: Conscious sedation was administered for 30 minutes.  The patient was

monitored by a qualified independent observer throughout the time of sedation.

 Please refer to the medical record for exact doses of medications utilized to

achieve moderate sedation.



Fluoroscopy time:  0.7 minutes

Dose area product: 0.7 Gycm2



Consent: The procedure was explained in its entirety to the patient or the

patients designated representative by a member of the treatment team,

including a discussion of the risks, benefits and commonly accepted

alternatives to the procedure, as well as the expected consequences of no

therapy whatsoever.   Discussion of the risks included, but was not limited

to, those that are most frequent and those that are rare but possibly severe

or life-threatening, as well as the possibility of unforeseen complications







Technique and Findings:  



 All elements of maximal sterile barrier technique including the use of a cap,

mask, sterile gown, sterile gloves, large sterile sheet, appropriate hand

hygiene, and 2% chlorhexidine for cutaneous antisepsis (or acceptable

alternative antiseptic per current guidelines) were followed for this

procedure. Following informed consent, and a timeout procedure, the patient

was prepped and draped in the usual sterile fashion.  



Ultrasound interrogation of the right neck revealed patency and

compressibility of the right internal jugular vein.  A 21-gauge micropuncture

was then used to gain access to this vein under ultrasound guidance.  A hard

copy ultrasound image was recorded.  The needle was exchanged over a wire for

a sheath.



 A 1 inch incision was made several centimeters inferior to the sternotomy

site. A catheter was tunneled from this site dermatotomy site in the neck.

Catheter was advanced through peel-away sheath such that its tip was in the

proximal right atrium with the patient supine. The catheter was trimmed to

length and connected to the port reservoir. The port was found to flush and

aspirate normally. The wound was closed in layers using 4-0 Vicryl suture.

Sterile dressings were applied.





Impression: Successful ultrasound and fluoroscopically guided placement of a

right internal jugular PowerPort

## 2017-09-27 NOTE — PDOC
MODERATE SEDATION ASSESSMENT


RISKS/ALTERNATIVES


Risks/Alternatives


Risks and alternatives of this type of sedation and procedure discussed with:


RISK/ALTERNATIVES:  Patient





H & P ON CHART


H & P


H & P on chart and reviewed for co-morbid conditions and appropriate labs.


H&P ON CHART:  Yes





PREGNANCY STATUS


PREG STATUS ASSESSED:  Yes





MEDS/ALLERGIES REVIEWED


Meds/Allergies Reviewed


Medications and Allergies including time and route of recently administered 

narcotics and sedatives.


MEDS/ALLERGIES REVIEWED:  Yes





ASA RATING


ASA RATING:  II





AIRWAY ASSESSMENT


Airway Assessment


Airway patency, oral function limitations, presence  of caps, crowns, dentures, 

partials, and ability to extend neck assessed.


AIRWAY ASSESSMENT:  Yes





MALLAMPATI SCORE


MALLAMPATI SCORE:  II





PRE-SEDATION ASSESSMENT


PRE-SEDATION ASSESSMENT:  Yes











ANA HEARD MD Sep 27, 2017 09:15

## 2018-03-01 ENCOUNTER — HOSPITAL ENCOUNTER (INPATIENT)
Dept: HOSPITAL 63 - ER | Age: 23
LOS: 7 days | Discharge: HOME | DRG: 871 | End: 2018-03-08
Attending: INTERNAL MEDICINE | Admitting: INTERNAL MEDICINE
Payer: OTHER GOVERNMENT

## 2018-03-01 VITALS — WEIGHT: 111.44 LBS | BODY MASS INDEX: 17.7 KG/M2 | HEIGHT: 66.5 IN

## 2018-03-01 DIAGNOSIS — R79.1: ICD-10-CM

## 2018-03-01 DIAGNOSIS — J45.909: ICD-10-CM

## 2018-03-01 DIAGNOSIS — N31.9: ICD-10-CM

## 2018-03-01 DIAGNOSIS — Z86.14: ICD-10-CM

## 2018-03-01 DIAGNOSIS — E06.3: ICD-10-CM

## 2018-03-01 DIAGNOSIS — B95.62: ICD-10-CM

## 2018-03-01 DIAGNOSIS — Z79.51: ICD-10-CM

## 2018-03-01 DIAGNOSIS — A41.59: Primary | ICD-10-CM

## 2018-03-01 DIAGNOSIS — J96.01: ICD-10-CM

## 2018-03-01 DIAGNOSIS — K22.2: ICD-10-CM

## 2018-03-01 DIAGNOSIS — Z99.3: ICD-10-CM

## 2018-03-01 DIAGNOSIS — Z87.440: ICD-10-CM

## 2018-03-01 DIAGNOSIS — Z87.442: ICD-10-CM

## 2018-03-01 DIAGNOSIS — Z87.820: ICD-10-CM

## 2018-03-01 DIAGNOSIS — R91.1: ICD-10-CM

## 2018-03-01 DIAGNOSIS — Z93.1: ICD-10-CM

## 2018-03-01 DIAGNOSIS — E87.70: ICD-10-CM

## 2018-03-01 DIAGNOSIS — E86.1: ICD-10-CM

## 2018-03-01 DIAGNOSIS — E27.1: ICD-10-CM

## 2018-03-01 DIAGNOSIS — K21.9: ICD-10-CM

## 2018-03-01 DIAGNOSIS — N10: ICD-10-CM

## 2018-03-01 DIAGNOSIS — E87.6: ICD-10-CM

## 2018-03-01 DIAGNOSIS — Z79.899: ICD-10-CM

## 2018-03-01 DIAGNOSIS — G57.93: ICD-10-CM

## 2018-03-01 DIAGNOSIS — Z88.1: ICD-10-CM

## 2018-03-01 DIAGNOSIS — E83.42: ICD-10-CM

## 2018-03-01 DIAGNOSIS — Z86.718: ICD-10-CM

## 2018-03-01 DIAGNOSIS — N85.4: ICD-10-CM

## 2018-03-01 DIAGNOSIS — J18.1: ICD-10-CM

## 2018-03-01 LAB
% BANDS: 7 % (ref 0–9)
% LYMPHS: 24 % (ref 24–48)
% SEGS: 69 % (ref 35–66)
ALBUMIN SERPL-MCNC: 3.5 G/DL (ref 3.4–5)
ALP SERPL-CCNC: 64 U/L (ref 46–116)
ALT SERPL-CCNC: 14 U/L (ref 14–59)
AMORPH SED URNS QL MICRO: PRESENT /HPF
ANION GAP SERPL CALC-SCNC: 12 MMOL/L (ref 6–14)
APTT PPP: (no result) S
AST SERPL-CCNC: 14 U/L (ref 15–37)
BACTERIA #/AREA URNS HPF: (no result) /HPF
BASOPHILS # BLD AUTO: 0 X10^3/UL (ref 0–0.2)
BASOPHILS NFR BLD: 1 % (ref 0–3)
BILIRUB DIRECT SERPL-MCNC: 0.1 MG/DL (ref 0–0.2)
BILIRUB SERPL-MCNC: 0.6 MG/DL (ref 0.2–1)
BILIRUB UR QL STRIP: (no result)
CA-I SERPL ISE-MCNC: 9 MG/DL (ref 7–20)
CALCIUM SERPL-MCNC: 8.8 MG/DL (ref 8.5–10.1)
CHLORIDE SERPL-SCNC: 104 MMOL/L (ref 98–107)
CO2 SERPL-SCNC: 22 MMOL/L (ref 21–32)
CREAT SERPL-MCNC: 0.6 MG/DL (ref 0.6–1)
EOSINOPHIL NFR BLD: 0 % (ref 0–3)
EOSINOPHIL NFR BLD: 0 X10^3/UL (ref 0–0.7)
ERYTHROCYTE [DISTWIDTH] IN BLOOD BY AUTOMATED COUNT: 13.3 % (ref 11.5–14.5)
FIBRINOGEN PPP-MCNC: (no result) MG/DL
GFR SERPLBLD BASED ON 1.73 SQ M-ARVRAT: 125 ML/MIN
GLUCOSE SERPL-MCNC: 98 MG/DL (ref 70–99)
GLUCOSE UR STRIP-MCNC: (no result) MG/DL
HCT VFR BLD CALC: 39.9 % (ref 36–47)
HGB BLD-MCNC: 13.6 G/DL (ref 12–15.5)
LIPASE: 128 U/L (ref 73–393)
LYMPHOCYTES # BLD: 0.7 X10^3/UL (ref 1–4.8)
LYMPHOCYTES NFR BLD AUTO: 26 % (ref 24–48)
MCH RBC QN AUTO: 30 PG (ref 25–35)
MCHC RBC AUTO-ENTMCNC: 34 G/DL (ref 31–37)
MCV RBC AUTO: 88 FL (ref 79–100)
MONO #: 0.1 X10^3/UL (ref 0–1.1)
MONOCYTES NFR BLD: 3 % (ref 0–9)
NEUT #: 1.8 X10^3UL (ref 1.8–7.7)
NEUTROPHILS NFR BLD AUTO: 70 % (ref 31–73)
NITRITE UR QL STRIP: (no result)
PLATELET # BLD AUTO: 196 X10^3/UL (ref 140–400)
PLATELET # BLD EST: ADEQUATE 10*3/UL
POTASSIUM SERPL-SCNC: 3 MMOL/L (ref 3.5–5.1)
PROT SERPL-MCNC: 7.7 G/DL (ref 6.4–8.2)
RBC # BLD AUTO: 4.56 X10^6/UL (ref 3.5–5.4)
RBC #/AREA URNS HPF: (no result) /HPF (ref 0–2)
SODIUM SERPL-SCNC: 138 MMOL/L (ref 136–145)
SP GR UR STRIP: 1.01
SQUAMOUS #/AREA URNS LPF: (no result) /LPF
UROBILINOGEN UR-MCNC: 0.2 MG/DL
WBC # BLD AUTO: 2.6 X10^3/UL (ref 4–11)
WBC #/AREA URNS HPF: (no result) /HPF (ref 0–4)

## 2018-03-01 PROCEDURE — 85379 FIBRIN DEGRADATION QUANT: CPT

## 2018-03-01 PROCEDURE — S0028 INJECTION, FAMOTIDINE, 20 MG: HCPCS

## 2018-03-01 PROCEDURE — 87641 MR-STAPH DNA AMP PROBE: CPT

## 2018-03-01 PROCEDURE — 83735 ASSAY OF MAGNESIUM: CPT

## 2018-03-01 PROCEDURE — 71275 CT ANGIOGRAPHY CHEST: CPT

## 2018-03-01 PROCEDURE — 96365 THER/PROPH/DIAG IV INF INIT: CPT

## 2018-03-01 PROCEDURE — 83605 ASSAY OF LACTIC ACID: CPT

## 2018-03-01 PROCEDURE — 85025 COMPLETE CBC W/AUTO DIFF WBC: CPT

## 2018-03-01 PROCEDURE — 87804 INFLUENZA ASSAY W/OPTIC: CPT

## 2018-03-01 PROCEDURE — 80076 HEPATIC FUNCTION PANEL: CPT

## 2018-03-01 PROCEDURE — 81025 URINE PREGNANCY TEST: CPT

## 2018-03-01 PROCEDURE — 85007 BL SMEAR W/DIFF WBC COUNT: CPT

## 2018-03-01 PROCEDURE — 87186 SC STD MICRODIL/AGAR DIL: CPT

## 2018-03-01 PROCEDURE — 85027 COMPLETE CBC AUTOMATED: CPT

## 2018-03-01 PROCEDURE — 36415 COLL VENOUS BLD VENIPUNCTURE: CPT

## 2018-03-01 PROCEDURE — 94640 AIRWAY INHALATION TREATMENT: CPT

## 2018-03-01 PROCEDURE — 96375 TX/PRO/DX INJ NEW DRUG ADDON: CPT

## 2018-03-01 PROCEDURE — 80202 ASSAY OF VANCOMYCIN: CPT

## 2018-03-01 PROCEDURE — 82803 BLOOD GASES ANY COMBINATION: CPT

## 2018-03-01 PROCEDURE — 80053 COMPREHEN METABOLIC PANEL: CPT

## 2018-03-01 PROCEDURE — 96374 THER/PROPH/DIAG INJ IV PUSH: CPT

## 2018-03-01 PROCEDURE — 87040 BLOOD CULTURE FOR BACTERIA: CPT

## 2018-03-01 PROCEDURE — 71045 X-RAY EXAM CHEST 1 VIEW: CPT

## 2018-03-01 PROCEDURE — 87086 URINE CULTURE/COLONY COUNT: CPT

## 2018-03-01 PROCEDURE — 93970 EXTREMITY STUDY: CPT

## 2018-03-01 PROCEDURE — 81001 URINALYSIS AUTO W/SCOPE: CPT

## 2018-03-01 PROCEDURE — 83690 ASSAY OF LIPASE: CPT

## 2018-03-01 PROCEDURE — 87205 SMEAR GRAM STAIN: CPT

## 2018-03-01 PROCEDURE — 80048 BASIC METABOLIC PNL TOTAL CA: CPT

## 2018-03-01 PROCEDURE — 74176 CT ABD & PELVIS W/O CONTRAST: CPT

## 2018-03-01 PROCEDURE — 94644 CONT INHLJ TX 1ST HOUR: CPT

## 2018-03-01 NOTE — RAD
Indication: Left flank pain. History of kidney stone.

 

TECHNIQUE: CT abdomen and pelvis without IV contrast with multiplanar 

reformats.

 

COMPARISON: None

 

FINDINGS:

Limited study due to lack of IV contrast. Heart is normal in size. No 

pericardial or pleural effusion. Clear lung bases. Noncontrast appearance 

of the Liver, spleen, gallbladder, pancreas, adrenals are within normal 

limits. No nephrolithiasis or hydronephrosis. No enlarged retroperitoneal 

or pelvic adenopathy. No bowel obstruction. G-tube noted. Appendix not 

visualized. Anteverted uterus. Bladder show no radiopaque stones. Right 

gluteal soft tissue spinal cord stimulator noted with its leads in the 

left presacral soft tissue. No free intraperitoneal air. No suspicious 

bony lesion.

 

IMPRESSION:

Limited study due to lack of IV contrast.

1. No nephrolithiasis or hydronephrosis.

2. No bowel obstruction.

 

Electronically signed by: Davidson Wray DO (3/1/2018 10:37 PM) [facility]

## 2018-03-01 NOTE — PHYS DOC
General


Chief Complaint:  RAPID HEART RATE


Stated Complaint:  FAST HEART RATE,LIPS TINGLY,THINKS SEPTIC


Time Seen by MD:  20:39


Source:  patient, family


Exam Limitations:  no limitations


Problems:  





History of Present Illness


Initial Comments


22-year-old female brought to the ED by her mother for possible sepsis.


Patient states that she developed left flank pain during the night last night, 

she awoke this morning with severe left-sided flank pain. She's had urinary 

frequency and thirst, throughout the day she has become achy all over and 

nauseous with face and lip tingling. Mom states this is how she typically 

presents with sepsis.


Patient has neurogenic bladder mom caths, also history of kidney stones and DVT 

among others.


Patient has immunodeficiency is treated with Privigen mom says she is had 

sepsis 3 times this past year. Due to her immunodeficiency patient typically 

does not manifest a fever, ED vitals: 99.2, 116, 20, 108/56, 97% room air.


Patient denies cough, sore throat, dyspnea, nasal discharge, hematuria, 

diarrhea.


Timing/Duration:  24 hours


Severity:  moderate


Modifying Factors:  improves with other


Associated Symptoms:  nausea/vomiting, other


Allergies:  


Coded Allergies:  


     erythromycin base (Verified  Allergy, Intermediate, 16)


     I S O L A T I O N *CONTACT* (Verified  Allergy, Unknown, 16)


 +nasal MRSA 16





Past Medical History


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 

immunodeficiency, neurogenic bladder, sepsis/bacteremia, Escherichia coli UTIs, 

lower extremity neuropathy and motor dysfunction, [place]'s disease, kidney 

stones, DVT, MRSA)


Surgical History:  other





Family History


Significant Family History:  no pertinent family hx





Social History


Smoker:  non-smoker


Alcohol:  none


Drugs:  none





Review of Systems


Constitutional:  chills, denies diaphoresis, denies fever, malaise, weakness


EENTM:  denies eye pain, denies ear pain, denies nose pain, denies nose 

congestion, denies throat pain


Respiratory:  denies cough, denies shortness of breath, denies wheezing


Cardiovascular:  denies chest pain, palpitations, denies syncope


Gastrointestinal:  abdominal pain, denies diarrhea, nausea


Genitourinary:  see HPI, denies dysuria, frequency, denies hematuria


Musculoskeletal:  see HPI, back pain (left flank), denies joint swelling, 

muscle pain, denies neck pain


Psychiatric/Neurological:  see HPI, denies headache


Hematologic/Lymphatic:  see HPI, blood clots, denies easy bleeding, denies easy 

bruising





Physical Exam


General Appearance:  no apparent distress (pale, ill-appearing)


Ear, Nose, Throat:  hearing grossly normal, normal ENT inspection (dry membranes

), normal pharynx


Neck:  non-tender, supple


Respiratory:  normal breath sounds, no respiratory distress


Cardiovascular:  normal peripheral pulses, tachycardia


Gastrointestinal:  normal bowel sounds, non tender, soft


Back:  no vertebral tenderness, CVA tenderness (L)


Extremities:  normal range of motion, non-tender, normal inspection


Neurologic/Psychiatric:  CNs II-XII nml as tested, no motor/sensory deficits, 

alert, oriented x 3, depressed affect


Skin:  pallor (poor turgor)





Orders, Labs, Meds


Urine preg neg





UA: 5-10 WBCs, positive nitrites, Rocephin 1 g IV given





WBC 2.6, potassium 3.0, lactic acid 1.6, d-dimer 6.9





CT abdomen to rule out kidney stones.





PATIENT: DENNISE PATE ACCOUNT: [id number] MRN#: [id number]


: 1995 LOCATION: ER AGE: 22


SEX: F EXAM DT: 18 ACCESSION#: [id number]


STATUS: REG ER ORD. PHYSICIAN: PADMINI CARMONA DO 


REASON: L flank pain h/o stones


PROCEDURE: CT ABDOMEN PELVIS WO CONTRAST





Indication: Left flank pain. History of kidney stone.


 


TECHNIQUE: CT abdomen and pelvis without IV contrast with multiplanar 


reformats.


 


COMPARISON: None


 


FINDINGS:


Limited study due to lack of IV contrast. Heart is normal in size. No 


pericardial or pleural effusion. Clear lung bases. Noncontrast appearance 


of the Liver, spleen, gallbladder, pancreas, adrenals are within normal 


limits. No nephrolithiasis or hydronephrosis. No enlarged retroperitoneal 


or pelvic adenopathy. No bowel obstruction. G-tube noted. Appendix not 


visualized. Anteverted uterus. Bladder show no radiopaque stones. Right 


gluteal soft tissue spinal cord stimulator noted with its leads in the 


left presacral soft tissue. No free intraperitoneal air. No suspicious 


bony lesion.


 


IMPRESSION:


Limited study due to lack of IV contrast.


1. No nephrolithiasis or hydronephrosis.


2. No bowel obstruction.


 


Electronically signed by: Davidson Wray DO (3/1/2018 10:37 PM) [facility]














DICTATED AND SIGNED BY:     DAVIDSON WRAY DO


DATE:     18





CC: LIZ HICKMAN MD; PADMINI CARMONA DO ~





CTA of the chest with tachycardia and elevated d-dimer to rule out PE








PATIENT: DENNISE PATE ACCOUNT: [id number] MRN#: [id number]


: 1995 LOCATION: ER AGE: 22


SEX: F EXAM DT: 18 ACCESSION#: [id number]


STATUS: REG ER ORD. PHYSICIAN: PADMINI CARMONA DO 


REASON: sob, tachycardia, elev d-dimer


PROCEDURE: CT ANGIOGRAPHY CHEST





CT ANGIOGRAPHY CHEST dated 3/2/2018 1:13 AM


 


Indication: Elevated d-dimer, tachycardiaOmni 300, 75ml IV. SOB, Elevated 


d-dimer, tachycardia. Hx DVT. Hx powerport in chest. Previous angio test 


2016 .


 


Comparison: 2016


 


Technique: Contiguous axial imaging the chest performed following the 


intravenous administration of 75 cc Omnipaque 300. Study performed as 


dedicated PE protocol with thin cut coronal MIPS 3-D reconstruction.


One or more of the following individualized dose reduction techniques were


utilized for this examination:  1. Automated exposure control  2. 


Adjustment of the mA and/or kV according to patient size  3. Use of 


iterative reconstruction technique


 


Findings: 


Contrast bolus is adequate. No evidence of central, lobar or segmental 


pulmonary embolus. Subsegmental branches not well evaluated based on 


technique.


 


Heart size within normal limits. No pericardial effusion. No mediastinal 


or axillary lymphadenopathy. There is a borderline enlarged right hilar 


lymph node that measures 12 mm short axis. Mild wall thickening of the 


thoracic esophagus with fluid filling the lumen. Thyroid gland 


unremarkable.


 


Central airways are patent. There is mild diffuse bronchial wall 


thickening. Prominent reticular nodular markings throughout both lungs, 


left greater than right.   Noncalcified pulmonary nodule in the left lower


lobe on image 78 measures 6 mm. Left lower lobe noncalcified pulmonary 


nodule on image 67 measures 7 mm. Numerous additional noncalcified 


subpleural nodules in the bilateral lower lobes. There is also a vague 


nodularity of the left upper lobe. No pleural effusion.


 


Limited images of upper abdomen unremarkable. No acute bony abnormality.


 


IMPRESSION:


1. No evidence of central, lobar or segmental pulmonary embolus.


2. Prominent reticular nodular markings throughout both lungs, left 


greater than right, new from prior study. This is nonspecific but could be


related to bronchiolitis or other atypical infection. Given the thickened 


fluid-filled thoracic esophagus, aspiration is another consideration 


3. More dominant noncalcified pulmonary nodules in the left lower lobe are


most likely related to the same process. Recommend short-term follow-up 


exam after treatment to ensure resolution.


4. Borderline enlarged right hilar lymph node, nonspecific.


 


Electronically signed by: Robbi Gleason MD (3/2/2018 2:53 AM) 


[facility]














DICTATED AND SIGNED BY:     ROBBI GLEASON MD


DATE:     18





CC: LIZ HICKMAN MD; PADMINI CARMONA DO ~








0325: Despite 3 L normal saline IV boluses patient remains tachycardic in the 

115 to 120 bpm range.








ED course:


22-year-old immunocompromised female to the emergency department with symptoms 

consistent with left ureterolithiasis or pyelonephritis and vital signs 

indicative of possible sepsis. CT abdomen pelvis negative for stone and urine 

positive Rocephin 1 g given initially for pyelonephritis. 40 mEq of KCl given 

by mouth due to hypokalemia, and CTA of the chest negative for PE with d-dimer 

6.9. Lactic acid 1.6, patient's blood pressure did drop throughout the ED 

course to 80s over 50s. She has received 3 L of normal saline intravenously and 

blood pressure has stabilized currently 112/58, she remains tachycardic. I 

discussed the need for inpatient admission and IV antibiotics, adding 

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 

syndrome). Will also need lower extremity Dopplers to rule out DVT.





0337: Patient discussed with Dr. Be who accepts inpatient ICU admission.











Impressions:


SIRS/sepsis


Left pyelonephritis


Hypokalemia


Hypovolemia


Elevated d-dimer


Immunocompromised


Departure


Disposition:   ADMITTED AS INPATIENT


Condition:  STABLE











PADMINI CARMONA DO Mar 1, 2018 21:45

## 2018-03-02 VITALS — DIASTOLIC BLOOD PRESSURE: 53 MMHG | SYSTOLIC BLOOD PRESSURE: 92 MMHG

## 2018-03-02 VITALS — DIASTOLIC BLOOD PRESSURE: 59 MMHG | SYSTOLIC BLOOD PRESSURE: 104 MMHG

## 2018-03-02 VITALS — DIASTOLIC BLOOD PRESSURE: 56 MMHG | SYSTOLIC BLOOD PRESSURE: 100 MMHG

## 2018-03-02 VITALS — DIASTOLIC BLOOD PRESSURE: 66 MMHG | SYSTOLIC BLOOD PRESSURE: 106 MMHG

## 2018-03-02 VITALS — SYSTOLIC BLOOD PRESSURE: 98 MMHG | DIASTOLIC BLOOD PRESSURE: 59 MMHG

## 2018-03-02 VITALS — DIASTOLIC BLOOD PRESSURE: 66 MMHG | SYSTOLIC BLOOD PRESSURE: 107 MMHG

## 2018-03-02 VITALS — DIASTOLIC BLOOD PRESSURE: 70 MMHG | SYSTOLIC BLOOD PRESSURE: 112 MMHG

## 2018-03-02 VITALS — DIASTOLIC BLOOD PRESSURE: 65 MMHG | SYSTOLIC BLOOD PRESSURE: 105 MMHG

## 2018-03-02 VITALS — DIASTOLIC BLOOD PRESSURE: 58 MMHG | SYSTOLIC BLOOD PRESSURE: 90 MMHG

## 2018-03-02 VITALS — SYSTOLIC BLOOD PRESSURE: 96 MMHG | DIASTOLIC BLOOD PRESSURE: 60 MMHG

## 2018-03-02 VITALS — SYSTOLIC BLOOD PRESSURE: 112 MMHG | DIASTOLIC BLOOD PRESSURE: 70 MMHG

## 2018-03-02 VITALS — DIASTOLIC BLOOD PRESSURE: 66 MMHG | SYSTOLIC BLOOD PRESSURE: 102 MMHG

## 2018-03-02 VITALS — DIASTOLIC BLOOD PRESSURE: 74 MMHG | SYSTOLIC BLOOD PRESSURE: 118 MMHG

## 2018-03-02 VITALS — DIASTOLIC BLOOD PRESSURE: 66 MMHG | SYSTOLIC BLOOD PRESSURE: 104 MMHG

## 2018-03-02 VITALS — DIASTOLIC BLOOD PRESSURE: 48 MMHG | SYSTOLIC BLOOD PRESSURE: 91 MMHG

## 2018-03-02 VITALS — SYSTOLIC BLOOD PRESSURE: 125 MMHG | DIASTOLIC BLOOD PRESSURE: 70 MMHG

## 2018-03-02 VITALS — DIASTOLIC BLOOD PRESSURE: 53 MMHG | SYSTOLIC BLOOD PRESSURE: 102 MMHG

## 2018-03-02 VITALS — SYSTOLIC BLOOD PRESSURE: 104 MMHG | DIASTOLIC BLOOD PRESSURE: 59 MMHG

## 2018-03-02 LAB
% BANDS: 14 % (ref 0–9)
% LYMPHS: 15 % (ref 24–48)
% MONOS: 3 % (ref 0–10)
% SEGS: 68 % (ref 35–66)
ALBUMIN SERPL-MCNC: 2.5 G/DL (ref 3.4–5)
ALBUMIN/GLOB SERPL: 0.7 {RATIO} (ref 1–1.7)
ALP SERPL-CCNC: 42 U/L (ref 46–116)
ALT SERPL-CCNC: 10 U/L (ref 14–59)
ANION GAP SERPL CALC-SCNC: 10 MMOL/L (ref 6–14)
AST SERPL-CCNC: 13 U/L (ref 15–37)
BASOPHILS # BLD AUTO: 0 X10^3/UL (ref 0–0.2)
BASOPHILS NFR BLD: 0 % (ref 0–3)
BILIRUB SERPL-MCNC: 0.4 MG/DL (ref 0.2–1)
BUN/CREAT SERPL: 9 (ref 6–20)
CA-I SERPL ISE-MCNC: 6 MG/DL (ref 7–20)
CALCIUM SERPL-MCNC: 7.3 MG/DL (ref 8.5–10.1)
CHLORIDE SERPL-SCNC: 108 MMOL/L (ref 98–107)
CO2 SERPL-SCNC: 20 MMOL/L (ref 21–32)
CREAT SERPL-MCNC: 0.7 MG/DL (ref 0.6–1)
EOSINOPHIL NFR BLD: 0 % (ref 0–3)
EOSINOPHIL NFR BLD: 0 X10^3/UL (ref 0–0.7)
ERYTHROCYTE [DISTWIDTH] IN BLOOD BY AUTOMATED COUNT: 13.2 % (ref 11.5–14.5)
GFR SERPLBLD BASED ON 1.73 SQ M-ARVRAT: 104.6 ML/MIN
GLOBULIN SER-MCNC: 3.4 G/DL (ref 2.2–3.8)
GLUCOSE SERPL-MCNC: 92 MG/DL (ref 70–99)
HCT VFR BLD CALC: 30.3 % (ref 36–47)
HGB BLD-MCNC: 10.3 G/DL (ref 12–15.5)
INFLUENZA A PATIENT: NEGATIVE
INFLUENZA B PATIENT: NEGATIVE
LYMPHOCYTES # BLD: 0.6 X10^3/UL (ref 1–4.8)
LYMPHOCYTES NFR BLD AUTO: 6 % (ref 24–48)
MCH RBC QN AUTO: 30 PG (ref 25–35)
MCHC RBC AUTO-ENTMCNC: 34 G/DL (ref 31–37)
MCV RBC AUTO: 89 FL (ref 79–100)
MONO #: 0.2 X10^3/UL (ref 0–1.1)
MONOCYTES NFR BLD: 2 % (ref 0–9)
NEUT #: 8.6 X10^3UL (ref 1.8–7.7)
NEUTROPHILS NFR BLD AUTO: 91 % (ref 31–73)
PLATELET # BLD AUTO: 125 X10^3/UL (ref 140–400)
PLATELET # BLD EST: ADEQUATE 10*3/UL
POTASSIUM SERPL-SCNC: 4 MMOL/L (ref 3.5–5.1)
PROT SERPL-MCNC: 5.9 G/DL (ref 6.4–8.2)
RBC # BLD AUTO: 3.42 X10^6/UL (ref 3.5–5.4)
SODIUM SERPL-SCNC: 138 MMOL/L (ref 136–145)
WBC # BLD AUTO: 9.4 X10^3/UL (ref 4–11)

## 2018-03-02 RX ADMIN — VANCOMYCIN HYDROCHLORIDE SCH MLS/HR: 750 INJECTION, POWDER, LYOPHILIZED, FOR SOLUTION INTRAVENOUS at 14:15

## 2018-03-02 RX ADMIN — PROMETHAZINE HYDROCHLORIDE PRN MLS/HR: 25 INJECTION INTRAMUSCULAR; INTRAVENOUS at 18:58

## 2018-03-02 RX ADMIN — HYDROMORPHONE HYDROCHLORIDE PRN MG: 1 INJECTION, SOLUTION INTRAMUSCULAR; INTRAVENOUS; SUBCUTANEOUS at 17:41

## 2018-03-02 RX ADMIN — POTASSIUM CHLORIDE SCH MEQ: 1500 TABLET, EXTENDED RELEASE ORAL at 16:00

## 2018-03-02 RX ADMIN — FAMOTIDINE SCH MG: 10 INJECTION, SOLUTION INTRAVENOUS at 21:07

## 2018-03-02 RX ADMIN — PROMETHAZINE HYDROCHLORIDE PRN MG: 25 TABLET ORAL at 14:14

## 2018-03-02 RX ADMIN — VANCOMYCIN HYDROCHLORIDE SCH MLS/HR: 750 INJECTION, POWDER, LYOPHILIZED, FOR SOLUTION INTRAVENOUS at 21:08

## 2018-03-02 RX ADMIN — VANCOMYCIN HYDROCHLORIDE PRN EACH: 1 INJECTION, POWDER, LYOPHILIZED, FOR SOLUTION INTRAVENOUS at 15:49

## 2018-03-02 RX ADMIN — VANCOMYCIN HYDROCHLORIDE PRN EACH: 1 INJECTION, POWDER, LYOPHILIZED, FOR SOLUTION INTRAVENOUS at 06:15

## 2018-03-02 RX ADMIN — SODIUM CHLORIDE AND POTASSIUM CHLORIDE SCH MLS/HR: .9; .15 SOLUTION INTRAVENOUS at 17:00

## 2018-03-02 RX ADMIN — FAMOTIDINE SCH MG: 10 INJECTION, SOLUTION INTRAVENOUS at 09:31

## 2018-03-02 RX ADMIN — VANCOMYCIN HYDROCHLORIDE PRN EACH: 1 INJECTION, POWDER, LYOPHILIZED, FOR SOLUTION INTRAVENOUS at 15:41

## 2018-03-02 RX ADMIN — ONDANSETRON PRN MG: 2 INJECTION INTRAMUSCULAR; INTRAVENOUS at 21:07

## 2018-03-02 RX ADMIN — HYDROMORPHONE HYDROCHLORIDE PRN MG: 1 INJECTION, SOLUTION INTRAMUSCULAR; INTRAVENOUS; SUBCUTANEOUS at 21:07

## 2018-03-02 RX ADMIN — ONDANSETRON PRN MG: 2 INJECTION INTRAMUSCULAR; INTRAVENOUS at 09:31

## 2018-03-02 RX ADMIN — Medication SCH CAP: at 09:00

## 2018-03-02 RX ADMIN — SODIUM CHLORIDE AND POTASSIUM CHLORIDE SCH MLS/HR: .9; .15 SOLUTION INTRAVENOUS at 07:31

## 2018-03-02 RX ADMIN — BUDESONIDE SCH MG: 0.5 SUSPENSION RESPIRATORY (INHALATION) at 20:00

## 2018-03-02 RX ADMIN — SODIUM CHLORIDE AND POTASSIUM CHLORIDE SCH MLS/HR: .9; .15 SOLUTION INTRAVENOUS at 11:39

## 2018-03-02 RX ADMIN — SODIUM CHLORIDE AND POTASSIUM CHLORIDE SCH MLS/HR: .9; .15 SOLUTION INTRAVENOUS at 05:54

## 2018-03-02 RX ADMIN — PIPERACILLIN SODIUM AND TAZOBACTAM SODIUM SCH MLS/HR: 3; .375 INJECTION, POWDER, LYOPHILIZED, FOR SOLUTION INTRAVENOUS at 16:57

## 2018-03-02 RX ADMIN — HYDROCORTISONE SODIUM SUCCINATE SCH MG: 100 INJECTION, POWDER, FOR SOLUTION INTRAMUSCULAR; INTRAVENOUS at 21:07

## 2018-03-02 RX ADMIN — HYDROMORPHONE HYDROCHLORIDE PRN MG: 1 INJECTION, SOLUTION INTRAMUSCULAR; INTRAVENOUS; SUBCUTANEOUS at 10:10

## 2018-03-02 RX ADMIN — ALBUTEROL SULFATE SCH MG: 108 AEROSOL, METERED RESPIRATORY (INHALATION) at 16:57

## 2018-03-02 RX ADMIN — HYDROMORPHONE HYDROCHLORIDE PRN MG: 1 INJECTION, SOLUTION INTRAMUSCULAR; INTRAVENOUS; SUBCUTANEOUS at 14:14

## 2018-03-02 RX ADMIN — VANCOMYCIN HYDROCHLORIDE PRN EACH: 1 INJECTION, POWDER, LYOPHILIZED, FOR SOLUTION INTRAVENOUS at 06:35

## 2018-03-02 RX ADMIN — PIPERACILLIN SODIUM AND TAZOBACTAM SODIUM SCH MLS/HR: 3; .375 INJECTION, POWDER, LYOPHILIZED, FOR SOLUTION INTRAVENOUS at 11:39

## 2018-03-02 RX ADMIN — METOPROLOL TARTRATE SCH MG: 25 TABLET, FILM COATED ORAL at 21:00

## 2018-03-02 RX ADMIN — ONDANSETRON PRN MG: 2 INJECTION INTRAMUSCULAR; INTRAVENOUS at 05:54

## 2018-03-02 RX ADMIN — Medication SCH CAP: at 21:00

## 2018-03-02 RX ADMIN — ALBUTEROL SULFATE SCH MG: 108 AEROSOL, METERED RESPIRATORY (INHALATION) at 20:00

## 2018-03-02 RX ADMIN — POTASSIUM CHLORIDE SCH MEQ: 1500 TABLET, EXTENDED RELEASE ORAL at 08:00

## 2018-03-02 NOTE — RAD
Bilateral lower extremity venous ultrasound, 3/2/2018:



History: Elevated d-dimer



Duplex evaluation of the deep veins in the lower extremities was performed

including grayscale, color-flow and spectral Doppler analysis. The femoral and

popliteal veins demonstrate normal compressibility and normal responses to

distal augmentation maneuvers.  Color imaging of those vessels shows no

evidence of intraluminal clot.  The visualized deep veins in both calves are

patent. 



IMPRESSION:   There is no sonographic evidence of deep vein thrombosis in

either lower extremity.

## 2018-03-02 NOTE — NUR
Jocy Vazquez a 23 y/o female was admitted by Dr Be, ICU status dx of SIRS, hypovolemia, 
hypokalemia, pyelonephritis.  Pt and mother were given written copies of hospital polices 
and procedures. Pt and mother oriented to unit, room. Admission assessment and process 
completed. Reviewed poc with pt and mother, both in agreement. Will proceed with poc.

## 2018-03-02 NOTE — RAD
CT ANGIOGRAPHY CHEST dated 3/2/2018 1:13 AM

 

Indication: Elevated d-dimer, tachycardiaOmni 300, 75ml IV. SOB, Elevated 

d-dimer, tachycardia. Hx DVT. Hx powerport in chest. Previous angio test 

5/4/2016 .

 

Comparison: 5/4/2016

 

Technique: Contiguous axial imaging the chest performed following the 

intravenous administration of 75 cc Omnipaque 300. Study performed as 

dedicated PE protocol with thin cut coronal MIPS 3-D reconstruction.

One or more of the following individualized dose reduction techniques were

utilized for this examination:  1. Automated exposure control  2. 

Adjustment of the mA and/or kV according to patient size  3. Use of 

iterative reconstruction technique

 

Findings: 

Contrast bolus is adequate. No evidence of central, lobar or segmental 

pulmonary embolus. Subsegmental branches not well evaluated based on 

technique.

 

Heart size within normal limits. No pericardial effusion. No mediastinal 

or axillary lymphadenopathy. There is a borderline enlarged right hilar 

lymph node that measures 12 mm short axis. Mild wall thickening of the 

thoracic esophagus with fluid filling the lumen. Thyroid gland 

unremarkable.

 

Central airways are patent. There is mild diffuse bronchial wall 

thickening. Prominent reticular nodular markings throughout both lungs, 

left greater than right.   Noncalcified pulmonary nodule in the left lower

lobe on image 78 measures 6 mm. Left lower lobe noncalcified pulmonary 

nodule on image 67 measures 7 mm. Numerous additional noncalcified 

subpleural nodules in the bilateral lower lobes. There is also a vague 

nodularity of the left upper lobe. No pleural effusion.

 

Limited images of upper abdomen unremarkable. No acute bony abnormality.

 

IMPRESSION:

1. No evidence of central, lobar or segmental pulmonary embolus.

2. Prominent reticular nodular markings throughout both lungs, left 

greater than right, new from prior study. This is nonspecific but could be

related to bronchiolitis or other atypical infection. Given the thickened 

fluid-filled thoracic esophagus, aspiration is another consideration 

3. More dominant noncalcified pulmonary nodules in the left lower lobe are

most likely related to the same process. Recommend short-term follow-up 

exam after treatment to ensure resolution.

4. Borderline enlarged right hilar lymph node, nonspecific.

 

Electronically signed by: Robbi Gleason MD (3/2/2018 2:53 AM) 

[facility]

## 2018-03-02 NOTE — HP
ADMIT DATE:  03/02/2018



HISTORY OF PRESENT ILLNESS:  The patient is a 22-year-old  female

patient who was brought to the Emergency Room by her mother for possible sepsis.

 The patient stated that she developed left flank pain during the night last

night and awoke this morning with severe left-sided flank pain.  She has had

urinary frequency and thirst throughout the day.  She has become achy all over

and nauseous with face and lip tingling.  She stated this is how she usually

typically presents with sepsis.  The patient has neurogenic bladder and her mom

catheterize her.  She apparently has history of kidney stones and DVT among

others.  She has also history of immunodeficiency treated with Privigen and the

last time she received her injection was last Wednesday.  The patient denied any

cough, sore throat, dyspnea, nasal discharge, hematuria, or diarrhea.  The

patient was investigated in the Emergency Room and her urinalysis showed only

5-10 wbc's but too many bacteria, was sent for culture and sensitivity.  Her

chemistry showed that her lactic acid went up slightly higher at 2.1.  She has

hypomagnesemia and her white cell count initially was low at 2.6.  She has had a

CT scan of the abdomen and pelvis, which basically showed that her heart is

normal in size, no pericardial or pleural effusion, clear lung bases. 

Non-contrasted appearance of the liver, spleen, gallbladder, pancreas, and

adrenals are within normal limits.  No nephrolithiasis or hydronephrosis, no

enlarged peritoneal or pelvic adenopathy.  No bowel obstruction.  G-tube noted. 

Appendix not visualized, anteverted uterus.  Bladder swab, no radiopaque stones.

 Right gluteal soft tissue spinal cord stimulator noted with its leads in the

left presacral soft tissue.  No free intraperitoneal air.  No suspicious bony

lesions and the impression is that the patient has no nephrolithiasis or

hydronephrosis, no bowel obstruction.  Given that she has a history of DVT, she

has had venous Doppler ultrasound of both lower extremities, which showed there

is no sonographic evidence of deep vein thrombosis in either lower extremity. 

Her CT angio was done for elevated D-dimer and history of DVT showed that she

has no evidence of central lobar or segmental pulmonary emboli.  She has

prominent reticular nodular markings throughout both lungs, left greater than

right, new from prior study.  This is nonspecific, but could be related to

bronchiolitis or other atypical infection given the thickened fluid filled

thoracic esophagus.  Aspiration is another consideration.  More dominant

calcified pulmonary nodule in the left lower lobe are most likely related to the

same process and she has borderline enlarged right hilar lymph nodes,

nonspecific.  Her mother stated that she did have esophageal stricture that was

dilated about a year ago by gastroenterologist at [facility].  In any case, the patient was admitted with sepsis and was started on IV

vancomycin as well as Zosyn.  I will probably add Levaquin to cover atypical

bacteria.  We will cover her also with the stress dose of steroids as she is

known to have [place]'s disease.



PAST MEDICAL HISTORY:  Significant for:

1.  Immunodeficiency syndrome diagnosed about 6 years ago.  She is followed by

Dr. Bowman.

2.  She has traumatic brain injury.

3.  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 

She is normally wheelchair bound.

4.  She has neurogenic bladder requiring straight catheterization.

5.  Hashimoto's thyroiditis.

6.  Adrenal insufficiency.

7.  Iron deficiency anemia for which she received iron infusion intravenously. 

She also has B12 deficiency anemia.  She had a history of septic ascending

cholangitis, multiple bacterial infection, gastroesophageal reflux disease, DVT,

nephrolithiasis, and bronchial asthma.  She is also known to have esophageal

stricture that required dilatation.



PAST SURGICAL HISTORY:  Significant for Nissen fundoplication x 2, percutaneous

endoscopic gastrostomy tube placement, nerve stimulator, Port-A-Cath placement,

tonsillectomy, adenoidectomy, and esophageal stricture.



FAMILY HISTORY:  Unremarkable.  She has 2 brothers who are healthy and her

parents are healthy.



SOCIAL HISTORY:  She does not smoke, drink alcohol, or recreational drugs.  She

lives with her parents.



ALLERGIES:  She is allergic to ERYTHROMYCIN.



MEDICATIONS:  She is currently on following medications:  She is on albuterol

sulfate 2 puffs every 4 hours as needed, Symbicort 2 puffs twice a day, Nexium

40 mg noon.  She is on hydrocodone/APAP 5/325 one to two tablets every 4-6

hours, linaclotide or Linzess 290 mcg p.o. daily p.r.n. for constipation,

metoclopramide 10 mg 3 times a day, metoprolol tartrate 25 mg p.o. b.i.d.,

ondansetron 8 mg every 4 hours p.r.n. for nausea and vomiting, potassium

chloride 20 mEq 3 times a day, promethazine 25 mg every 4 hours as needed for

nausea and vomiting, tramadol 50 mg every 6 hours as needed.



REVIEW OF SYSTEMS:  As per history of present illness.



PHYSICAL EXAMINATION:

GENERAL:  On arrival to the Emergency Room, the patient looked pale, cachectic,

but not jaundiced, cyanosed from thyromegaly.  No jugular venous distension.  No

lower limb edema.

VITAL SIGNS:  Her heart rate was 116, blood pressure was 104/59, her temperature

was 99.2, respiratory rate 20, and oxygen saturation was 97% on room air.

HEAD, EYES, EARS, NOSE, AND THROAT:  Showed normocephalic, atraumatic.

NECK:  Supple.

HEART:  Showed normal first and second heart sounds.  No gallop, rub, or murmur.

CHEST:  Clear to auscultation.  No crepitation or rhonchi.

ABDOMEN:  Scaphoid, soft, nontender.  No guarding or rigidity.  No organomegaly.

 All hernial orifices intact.  Bowel sounds normal.

NEUROLOGIC:  She is awake, alert, responding appropriately.  All cranial nerves

intact.  She has left upper and bilateral lower extremity weakness.  She has

neurogenic bladder requiring straight catheterization.  She is able to move her

right upper extremity without difficulty.



LABORATORY DATA:  Her lab work on arrival to the Emergency Room showed that her

white cell count was 2600, hemoglobin 13.6, hematocrit 39.9, MCV 88, and

platelet count of 196,000.  Her D-dimer was high at 6.9 mg/dL.  Her chemistry on

arrival showed a serum sodium of 138, potassium 3, chloride 104, bicarbonate 22,

anion gap of 12, BUN 9, creatinine 0.6.  Estimated GFR was 125 mL per minute. 

Her glucose was 98, calcium was 8.8.  Total bilirubin, AST, ALT, alkaline

phosphatase were normal.  Her total protein was 7.7, albumin 3.5 and serum

lipase was 250.  Her urinalysis showed the urine was straw colored, hazy with a

pH of 5.5, specific gravity 1.015.  There is large amount of protein.  The urine

was negative for glucose, negative for ketones, just small amount of blood,

positive for nitrite and negative for leukocyte esterase, 1-2 rbc's, 5-10 wbc's,

many bacteria.  The urine pregnancy test was negative.  Her influenza A and B

were negative.  As stated, her CT scan of the abdomen and pelvis was basically

unremarkable and showed no evidence of nephrolithiasis or hydronephrosis.  No

bowel obstruction.  CT angio showed no evidence of pulmonary emboli, however,

did show prominent reticular nodular markings throughout both lungs, left

greater than right, new from previous study.  This is nonspecific, but could be

related to bronchiolitis or other atypical infection given the thickened fluid

filled thoracic esophagus.  Aspiration is another consideration.  Borderline

enlarged hilar lymph nodes.  Given her history of DVT and also high D-dimer, she

underwent venous ultrasound of both lower extremities which showed no

sonographic evidence of deep vein thrombosis in either lower extremity.



PLAN:  My plan is to continue with IV fluid, continue with IV antibiotic. 

Continue to monitor her electrolytes as she came in with hypokalemia.  We will

start her on stress dose of steroids and add Levaquin and await the results of

culture and sensitivity.  There are multiple possible sources of infection

including aspiration pneumonia, community-acquired pneumonia, and urosepsis due

to urinary tract infection.





______________________________

MADISON MACKAY MD



DR:  IVANA/clare  JOB#:  [id number] / [id number]

DD:  03/02/2018 14:20  DT:  03/02/2018 15:37

## 2018-03-02 NOTE — NUR
Pharmacy Vancomycin Dosing Note



S:Consulted to monitor and dose vancomycin started 03/02/18.



O:DENNISE PATE is a 22 year old F with 

Sepsis SIRS .



Height: 5 feet, 6 inches

Weight: 44.6 kg

Ideal Body Weight: 59.30 

Adjusted Body Weight: 53.38 

Dosing Weight: Adjusted



Other Antibiotics: 

ZOSYN 3.375 Q6H



LABS:

Last BUN: 9 

Last Creatinine: 0.6 

Creatinine Clearance: 103 

Last WBC: 2.6 

Last Platelets: 196 

Tmax (past 24 hours): 



Microbiology: 



I/O: 



Drug Levels:

Last  level:  on  at 

Last dose given 03/02/18 at 0614 



Vancomycin Dosing:

Loading Dose: 1000 mg x1

Dosing Weight: Adjusted

Target Trough: 15-20





A: Based on: WT AND CRCL





P: 1. Begin Vancomycin 750 mg IV q8h

   2. Follow up Trough level on 03/03/18 at 0530 

   3. Pharmacy will continue to monitor, follow and adjust therapy as needed.

 

 YOUSUF PATRICIO RPH,  03/02/18 0635

-------------------------------------------------------------------------------

Signed:    03/02/18 at 0636 by YOUSUF PATRICIO RPH PHA

-------------------------------------------------------------------------------

## 2018-03-02 NOTE — NUR
IP: patient has long hx of MRSA, requires contact precautions until 2 negative results 7 
days apart.

## 2018-03-03 VITALS — SYSTOLIC BLOOD PRESSURE: 108 MMHG | DIASTOLIC BLOOD PRESSURE: 67 MMHG

## 2018-03-03 VITALS — SYSTOLIC BLOOD PRESSURE: 86 MMHG | DIASTOLIC BLOOD PRESSURE: 52 MMHG

## 2018-03-03 VITALS — DIASTOLIC BLOOD PRESSURE: 67 MMHG | SYSTOLIC BLOOD PRESSURE: 107 MMHG

## 2018-03-03 VITALS — SYSTOLIC BLOOD PRESSURE: 109 MMHG | DIASTOLIC BLOOD PRESSURE: 61 MMHG

## 2018-03-03 VITALS — SYSTOLIC BLOOD PRESSURE: 106 MMHG | DIASTOLIC BLOOD PRESSURE: 70 MMHG

## 2018-03-03 VITALS — DIASTOLIC BLOOD PRESSURE: 74 MMHG | SYSTOLIC BLOOD PRESSURE: 108 MMHG

## 2018-03-03 VITALS — DIASTOLIC BLOOD PRESSURE: 57 MMHG | SYSTOLIC BLOOD PRESSURE: 101 MMHG

## 2018-03-03 VITALS — DIASTOLIC BLOOD PRESSURE: 72 MMHG | SYSTOLIC BLOOD PRESSURE: 109 MMHG

## 2018-03-03 VITALS — SYSTOLIC BLOOD PRESSURE: 101 MMHG | DIASTOLIC BLOOD PRESSURE: 59 MMHG

## 2018-03-03 VITALS — DIASTOLIC BLOOD PRESSURE: 70 MMHG | SYSTOLIC BLOOD PRESSURE: 132 MMHG

## 2018-03-03 VITALS — DIASTOLIC BLOOD PRESSURE: 58 MMHG | SYSTOLIC BLOOD PRESSURE: 109 MMHG

## 2018-03-03 VITALS — SYSTOLIC BLOOD PRESSURE: 101 MMHG | DIASTOLIC BLOOD PRESSURE: 57 MMHG

## 2018-03-03 VITALS — SYSTOLIC BLOOD PRESSURE: 122 MMHG | DIASTOLIC BLOOD PRESSURE: 79 MMHG

## 2018-03-03 VITALS — DIASTOLIC BLOOD PRESSURE: 60 MMHG | SYSTOLIC BLOOD PRESSURE: 98 MMHG

## 2018-03-03 VITALS — SYSTOLIC BLOOD PRESSURE: 107 MMHG | DIASTOLIC BLOOD PRESSURE: 69 MMHG

## 2018-03-03 VITALS — DIASTOLIC BLOOD PRESSURE: 85 MMHG | SYSTOLIC BLOOD PRESSURE: 118 MMHG

## 2018-03-03 LAB
ALBUMIN SERPL-MCNC: 2.7 G/DL (ref 3.4–5)
ALBUMIN/GLOB SERPL: 0.7 {RATIO} (ref 1–1.7)
ALP SERPL-CCNC: 42 U/L (ref 46–116)
ALT SERPL-CCNC: 11 U/L (ref 14–59)
ANION GAP SERPL CALC-SCNC: 10 MMOL/L (ref 6–14)
AST SERPL-CCNC: 12 U/L (ref 15–37)
BASOPHILS # BLD AUTO: 0 X10^3/UL (ref 0–0.2)
BASOPHILS NFR BLD: 0 % (ref 0–3)
BILIRUB SERPL-MCNC: 0.3 MG/DL (ref 0.2–1)
BUN/CREAT SERPL: 10 (ref 6–20)
CA-I SERPL ISE-MCNC: 5 MG/DL (ref 7–20)
CALCIUM SERPL-MCNC: 8.3 MG/DL (ref 8.5–10.1)
CHLORIDE SERPL-SCNC: 104 MMOL/L (ref 98–107)
CO2 SERPL-SCNC: 21 MMOL/L (ref 21–32)
CREAT SERPL-MCNC: 0.5 MG/DL (ref 0.6–1)
EOSINOPHIL NFR BLD: 0 % (ref 0–3)
EOSINOPHIL NFR BLD: 0 X10^3/UL (ref 0–0.7)
ERYTHROCYTE [DISTWIDTH] IN BLOOD BY AUTOMATED COUNT: 13.2 % (ref 11.5–14.5)
GFR SERPLBLD BASED ON 1.73 SQ M-ARVRAT: 154.3 ML/MIN
GLOBULIN SER-MCNC: 3.9 G/DL (ref 2.2–3.8)
GLUCOSE SERPL-MCNC: 98 MG/DL (ref 70–99)
HCT VFR BLD CALC: 32.1 % (ref 36–47)
HGB BLD-MCNC: 10.8 G/DL (ref 12–15.5)
LYMPHOCYTES # BLD: 0.4 X10^3/UL (ref 1–4.8)
LYMPHOCYTES NFR BLD AUTO: 11 % (ref 24–48)
MCH RBC QN AUTO: 30 PG (ref 25–35)
MCHC RBC AUTO-ENTMCNC: 34 G/DL (ref 31–37)
MCV RBC AUTO: 89 FL (ref 79–100)
MONO #: 0.1 X10^3/UL (ref 0–1.1)
MONOCYTES NFR BLD: 3 % (ref 0–9)
NEUT #: 3.5 X10^3UL (ref 1.8–7.7)
NEUTROPHILS NFR BLD AUTO: 86 % (ref 31–73)
PLATELET # BLD AUTO: 124 X10^3/UL (ref 140–400)
POTASSIUM SERPL-SCNC: 4.1 MMOL/L (ref 3.5–5.1)
PROT SERPL-MCNC: 6.6 G/DL (ref 6.4–8.2)
RBC # BLD AUTO: 3.62 X10^6/UL (ref 3.5–5.4)
SODIUM SERPL-SCNC: 135 MMOL/L (ref 136–145)
VANC TR: 7.8 MCG/ML (ref 10–20)
WBC # BLD AUTO: 4.1 X10^3/UL (ref 4–11)

## 2018-03-03 RX ADMIN — PROMETHAZINE HYDROCHLORIDE PRN MG: 25 TABLET ORAL at 21:12

## 2018-03-03 RX ADMIN — ALBUTEROL SULFATE SCH MG: 108 AEROSOL, METERED RESPIRATORY (INHALATION) at 05:05

## 2018-03-03 RX ADMIN — VANCOMYCIN HYDROCHLORIDE SCH MLS/HR: 1 INJECTION, POWDER, LYOPHILIZED, FOR SOLUTION INTRAVENOUS at 22:09

## 2018-03-03 RX ADMIN — VANCOMYCIN HYDROCHLORIDE SCH MLS/HR: 750 INJECTION, POWDER, LYOPHILIZED, FOR SOLUTION INTRAVENOUS at 06:42

## 2018-03-03 RX ADMIN — HYDROMORPHONE HYDROCHLORIDE PRN MG: 1 INJECTION, SOLUTION INTRAMUSCULAR; INTRAVENOUS; SUBCUTANEOUS at 00:03

## 2018-03-03 RX ADMIN — HYDROMORPHONE HYDROCHLORIDE PRN MG: 1 INJECTION, SOLUTION INTRAMUSCULAR; INTRAVENOUS; SUBCUTANEOUS at 15:28

## 2018-03-03 RX ADMIN — BUDESONIDE SCH MG: 0.5 SUSPENSION RESPIRATORY (INHALATION) at 20:19

## 2018-03-03 RX ADMIN — FAMOTIDINE SCH MG: 10 INJECTION, SOLUTION INTRAVENOUS at 21:12

## 2018-03-03 RX ADMIN — FAMOTIDINE SCH MG: 10 INJECTION, SOLUTION INTRAVENOUS at 08:33

## 2018-03-03 RX ADMIN — VANCOMYCIN HYDROCHLORIDE SCH MLS/HR: 1 INJECTION, POWDER, LYOPHILIZED, FOR SOLUTION INTRAVENOUS at 14:43

## 2018-03-03 RX ADMIN — VANCOMYCIN HYDROCHLORIDE PRN EACH: 1 INJECTION, POWDER, LYOPHILIZED, FOR SOLUTION INTRAVENOUS at 08:22

## 2018-03-03 RX ADMIN — PIPERACILLIN SODIUM AND TAZOBACTAM SODIUM SCH MLS/HR: 3; .375 INJECTION, POWDER, LYOPHILIZED, FOR SOLUTION INTRAVENOUS at 18:19

## 2018-03-03 RX ADMIN — HYDROCORTISONE SODIUM SUCCINATE SCH MG: 100 INJECTION, POWDER, FOR SOLUTION INTRAMUSCULAR; INTRAVENOUS at 21:12

## 2018-03-03 RX ADMIN — ENOXAPARIN SODIUM SCH MG: 100 INJECTION SUBCUTANEOUS at 05:39

## 2018-03-03 RX ADMIN — METOPROLOL TARTRATE SCH MG: 25 TABLET, FILM COATED ORAL at 11:04

## 2018-03-03 RX ADMIN — PROMETHAZINE HYDROCHLORIDE PRN MLS/HR: 25 INJECTION INTRAMUSCULAR; INTRAVENOUS at 14:43

## 2018-03-03 RX ADMIN — PROMETHAZINE HYDROCHLORIDE PRN MLS/HR: 25 INJECTION INTRAMUSCULAR; INTRAVENOUS at 08:33

## 2018-03-03 RX ADMIN — ALBUTEROL SULFATE SCH MG: 108 AEROSOL, METERED RESPIRATORY (INHALATION) at 15:17

## 2018-03-03 RX ADMIN — METOPROLOL TARTRATE SCH MG: 25 TABLET, FILM COATED ORAL at 21:13

## 2018-03-03 RX ADMIN — SODIUM CHLORIDE AND POTASSIUM CHLORIDE SCH MLS/HR: .9; .15 SOLUTION INTRAVENOUS at 04:11

## 2018-03-03 RX ADMIN — HYDROMORPHONE HYDROCHLORIDE PRN MG: 1 INJECTION, SOLUTION INTRAMUSCULAR; INTRAVENOUS; SUBCUTANEOUS at 03:11

## 2018-03-03 RX ADMIN — HYDROMORPHONE HYDROCHLORIDE PRN MG: 1 INJECTION, SOLUTION INTRAMUSCULAR; INTRAVENOUS; SUBCUTANEOUS at 08:33

## 2018-03-03 RX ADMIN — PROMETHAZINE HYDROCHLORIDE PRN MLS/HR: 25 INJECTION INTRAMUSCULAR; INTRAVENOUS at 01:19

## 2018-03-03 RX ADMIN — HYDROMORPHONE HYDROCHLORIDE PRN MG: 1 INJECTION, SOLUTION INTRAMUSCULAR; INTRAVENOUS; SUBCUTANEOUS at 05:39

## 2018-03-03 RX ADMIN — HYDROMORPHONE HYDROCHLORIDE PRN MG: 1 INJECTION, SOLUTION INTRAMUSCULAR; INTRAVENOUS; SUBCUTANEOUS at 22:10

## 2018-03-03 RX ADMIN — SODIUM CHLORIDE AND POTASSIUM CHLORIDE SCH MLS/HR: .9; .15 SOLUTION INTRAVENOUS at 14:26

## 2018-03-03 RX ADMIN — ALBUTEROL SULFATE SCH MG: 108 AEROSOL, METERED RESPIRATORY (INHALATION) at 09:43

## 2018-03-03 RX ADMIN — PROMETHAZINE HYDROCHLORIDE PRN MLS/HR: 25 INJECTION INTRAMUSCULAR; INTRAVENOUS at 22:37

## 2018-03-03 RX ADMIN — PIPERACILLIN SODIUM AND TAZOBACTAM SODIUM SCH MLS/HR: 3; .375 INJECTION, POWDER, LYOPHILIZED, FOR SOLUTION INTRAVENOUS at 05:40

## 2018-03-03 RX ADMIN — ALBUTEROL SULFATE SCH MG: 108 AEROSOL, METERED RESPIRATORY (INHALATION) at 20:19

## 2018-03-03 RX ADMIN — PIPERACILLIN SODIUM AND TAZOBACTAM SODIUM SCH MLS/HR: 3; .375 INJECTION, POWDER, LYOPHILIZED, FOR SOLUTION INTRAVENOUS at 12:20

## 2018-03-03 RX ADMIN — HYDROMORPHONE HYDROCHLORIDE PRN MG: 1 INJECTION, SOLUTION INTRAMUSCULAR; INTRAVENOUS; SUBCUTANEOUS at 19:04

## 2018-03-03 RX ADMIN — HYDROCORTISONE SODIUM SUCCINATE SCH MG: 100 INJECTION, POWDER, FOR SOLUTION INTRAMUSCULAR; INTRAVENOUS at 05:39

## 2018-03-03 RX ADMIN — BUDESONIDE SCH MG: 0.5 SUSPENSION RESPIRATORY (INHALATION) at 09:43

## 2018-03-03 RX ADMIN — PIPERACILLIN SODIUM AND TAZOBACTAM SODIUM SCH MLS/HR: 3; .375 INJECTION, POWDER, LYOPHILIZED, FOR SOLUTION INTRAVENOUS at 00:03

## 2018-03-03 RX ADMIN — HYDROCORTISONE SODIUM SUCCINATE SCH MG: 100 INJECTION, POWDER, FOR SOLUTION INTRAMUSCULAR; INTRAVENOUS at 14:26

## 2018-03-03 RX ADMIN — PANTOPRAZOLE SODIUM SCH MG: 40 TABLET, DELAYED RELEASE ORAL at 12:20

## 2018-03-03 RX ADMIN — HYDROMORPHONE HYDROCHLORIDE PRN MG: 1 INJECTION, SOLUTION INTRAMUSCULAR; INTRAVENOUS; SUBCUTANEOUS at 12:20

## 2018-03-03 RX ADMIN — SODIUM CHLORIDE AND POTASSIUM CHLORIDE SCH MLS/HR: .9; .15 SOLUTION INTRAVENOUS at 06:00

## 2018-03-03 NOTE — PN
DATE:  03/03/2018



SUBJECTIVE:  The patient is resting slightly propped up in bed, in no apparent

distress.  She is awake, alert, feeling generally better, although she has

itching, although no obvious rash.



OBJECTIVE:

GENERAL:  When I examined her, she was pale, but no jaundice, cyanosis,

lymphadenopathy or thyromegaly.  No jugular venous distension.  No lower limb

edema.

VITAL SIGNS:  Her heart rate was 74, blood pressure was 101/59, temperature was

98.6, respiratory rate was 16, and oxygen saturation was 98%.

HEAD, EYES, EARS, NOSE AND THROAT:  Showed normocephalic, atraumatic.

NECK:  Supple.

HEART:  Showed normal first and second heart sounds with no gallop, rub or

murmur.

CHEST:  Clear to auscultation.  No crepitation or rhonchi.

ABDOMEN:  Distended, soft, nontender.

NEUROLOGIC:  She is awake, alert, responding appropriately.  She moves her upper

extremities, her right upper extremity to much good extent than the left upper

extremity.  She has paraplegia.  She was mostly bedbound, chair bound.



Her intake was 4750, output was 3700.



LABORATORY DATA:  This morning, her serum sodium was 135, potassium 4.1,

chloride 104, bicarbonate 21, anion gap of 10, BUN 5, creatinine 0.5, estimated

GFR was 154 mL per minute.  Her glucose was 98, calcium was 8.3.  Total

bilirubin, AST, ALT, alkaline phosphatase were normal.  Total protein 6.6,

albumin 2.7.  Her white cell count was 4100, hemoglobin 11, hematocrit 32, MCV

89 and platelet count of 124,000.  Her vancomycin trough level was 7.8.  Her

urine culture showed gram-negative rods, greater than 100,000 colony forming

units per mL.  Her blood culture showed gram-positive cocci in chains, seen in 2

out of 2 bottles.



ASSESSMENT:

1.  Sepsis with possible multiple potential sources of infection including her

UTI, aspiration pneumonia.  She has a Port-A-Cath also, with a possible

potential source of infection.

2.  Hypomagnesemia.

3.  Esophageal stricture, possible aspiration pneumonia.

4.  Adrenal insufficiency, on stress dose of steroids.



PLAN:  My plan is to continue with all the antibiotics and await the

identification and sensitivity tomorrow.  We will replenish her magnesium today

and once we have the identity and sensitivity, we will deescalate her

antibiotic.





______________________________

MADISON MACKAY MD



DR:  IVANA/clare  JOB#:  [id number] / [id number]

DD:  03/03/2018 14:44  DT:  03/03/2018 19:37

## 2018-03-03 NOTE — NUR
Pharmacy Vancomycin Dosing Note



S:Consulted to monitor and dose vancomycin started 03/02/18.



O:DENNISE PATE is a 22 year old F with 

Sepsis SIRS .



Height: 5 feet, 6.5 inches

Weight: 52.131572 kg

Ideal Body Weight: 60.45 

Adjusted Body Weight: 57.15 

Dosing Weight: Adjusted



Other Antibiotics: 

ZOSYN 3.375 Q6H

LEVAQUIN



LABS:

Last BUN: 5 

Last Creatinine: 0.5 

Creatinine Clearance: 145.49 

Last WBC: 4.1 

Last Platelets: 124 

 



Drug Levels:

Last Trough level: 7.8  on 03/03/18 at 0530 

Last dose given 03/03/18 at 0600 



Vancomycin Dosing:

Loading Dose: 1000 mg x1

Dosing Weight: Adjusted

Target Trough: 15-20





A: Based on: Trough and increased renal function





P: 1. Begin Vancomycin 1000 mg IV q8h

   2. Follow up Trough level on 03/04/18 at 1330 

   3. Pharmacy will continue to monitor, follow and adjust therapy as needed.

 

 MALIK VILLELA,  03/03/18 0806

## 2018-03-04 VITALS — DIASTOLIC BLOOD PRESSURE: 85 MMHG | SYSTOLIC BLOOD PRESSURE: 121 MMHG

## 2018-03-04 VITALS — SYSTOLIC BLOOD PRESSURE: 134 MMHG | DIASTOLIC BLOOD PRESSURE: 84 MMHG

## 2018-03-04 VITALS — SYSTOLIC BLOOD PRESSURE: 118 MMHG | DIASTOLIC BLOOD PRESSURE: 79 MMHG

## 2018-03-04 VITALS — DIASTOLIC BLOOD PRESSURE: 94 MMHG | SYSTOLIC BLOOD PRESSURE: 139 MMHG

## 2018-03-04 VITALS — DIASTOLIC BLOOD PRESSURE: 84 MMHG | SYSTOLIC BLOOD PRESSURE: 123 MMHG

## 2018-03-04 VITALS — SYSTOLIC BLOOD PRESSURE: 110 MMHG | DIASTOLIC BLOOD PRESSURE: 69 MMHG

## 2018-03-04 VITALS — DIASTOLIC BLOOD PRESSURE: 91 MMHG | SYSTOLIC BLOOD PRESSURE: 134 MMHG

## 2018-03-04 VITALS — SYSTOLIC BLOOD PRESSURE: 146 MMHG | DIASTOLIC BLOOD PRESSURE: 99 MMHG

## 2018-03-04 VITALS — SYSTOLIC BLOOD PRESSURE: 128 MMHG | DIASTOLIC BLOOD PRESSURE: 83 MMHG

## 2018-03-04 LAB
ANION GAP SERPL CALC-SCNC: 6 MMOL/L (ref 6–14)
ANION GAP SERPL CALC-SCNC: 8 MMOL/L (ref 6–14)
CA-I SERPL ISE-MCNC: 3 MG/DL (ref 7–20)
CA-I SERPL ISE-MCNC: 3 MG/DL (ref 7–20)
CALCIUM SERPL-MCNC: 8.2 MG/DL (ref 8.5–10.1)
CALCIUM SERPL-MCNC: 8.3 MG/DL (ref 8.5–10.1)
CHLORIDE SERPL-SCNC: 108 MMOL/L (ref 98–107)
CHLORIDE SERPL-SCNC: 110 MMOL/L (ref 98–107)
CO2 SERPL-SCNC: 25 MMOL/L (ref 21–32)
CO2 SERPL-SCNC: 25 MMOL/L (ref 21–32)
CREAT SERPL-MCNC: 0.4 MG/DL (ref 0.6–1)
CREAT SERPL-MCNC: 0.5 MG/DL (ref 0.6–1)
ERYTHROCYTE [DISTWIDTH] IN BLOOD BY AUTOMATED COUNT: 12.8 % (ref 11.5–14.5)
GFR SERPLBLD BASED ON 1.73 SQ M-ARVRAT: 154.3 ML/MIN
GFR SERPLBLD BASED ON 1.73 SQ M-ARVRAT: 199.6 ML/MIN
GLUCOSE SERPL-MCNC: 110 MG/DL (ref 70–99)
GLUCOSE SERPL-MCNC: 119 MG/DL (ref 70–99)
HCT VFR BLD CALC: 28.9 % (ref 36–47)
HGB BLD-MCNC: 9.9 G/DL (ref 12–15.5)
MAGNESIUM SERPL-MCNC: 1.9 MG/DL (ref 1.8–2.4)
MCH RBC QN AUTO: 30 PG (ref 25–35)
MCHC RBC AUTO-ENTMCNC: 34 G/DL (ref 31–37)
MCV RBC AUTO: 87 FL (ref 79–100)
PLATELET # BLD AUTO: 142 X10^3/UL (ref 140–400)
POTASSIUM SERPL-SCNC: 2.9 MMOL/L (ref 3.5–5.1)
POTASSIUM SERPL-SCNC: 3.9 MMOL/L (ref 3.5–5.1)
RBC # BLD AUTO: 3.31 X10^6/UL (ref 3.5–5.4)
SODIUM SERPL-SCNC: 141 MMOL/L (ref 136–145)
SODIUM SERPL-SCNC: 141 MMOL/L (ref 136–145)
VANC TR: 11.7 MCG/ML (ref 10–20)
WBC # BLD AUTO: 4.7 X10^3/UL (ref 4–11)

## 2018-03-04 RX ADMIN — BUDESONIDE SCH MG: 0.5 SUSPENSION RESPIRATORY (INHALATION) at 20:10

## 2018-03-04 RX ADMIN — POTASSIUM CHLORIDE SCH MLS/HR: 200 INJECTION, SOLUTION INTRAVENOUS at 12:09

## 2018-03-04 RX ADMIN — PROMETHAZINE HYDROCHLORIDE PRN MLS/HR: 25 INJECTION INTRAMUSCULAR; INTRAVENOUS at 18:06

## 2018-03-04 RX ADMIN — SODIUM CHLORIDE AND POTASSIUM CHLORIDE SCH MLS/HR: .9; .15 SOLUTION INTRAVENOUS at 08:41

## 2018-03-04 RX ADMIN — SODIUM CHLORIDE AND POTASSIUM CHLORIDE SCH MLS/HR: 9; 2.98 INJECTION, SOLUTION INTRAVENOUS at 10:03

## 2018-03-04 RX ADMIN — HYDROMORPHONE HYDROCHLORIDE PRN MG: 2 INJECTION, SOLUTION INTRAMUSCULAR; INTRAVENOUS; SUBCUTANEOUS at 12:00

## 2018-03-04 RX ADMIN — BUDESONIDE SCH MG: 0.5 SUSPENSION RESPIRATORY (INHALATION) at 09:12

## 2018-03-04 RX ADMIN — METOPROLOL TARTRATE SCH MG: 25 TABLET, FILM COATED ORAL at 21:22

## 2018-03-04 RX ADMIN — PIPERACILLIN SODIUM AND TAZOBACTAM SODIUM SCH MLS/HR: 3; .375 INJECTION, POWDER, LYOPHILIZED, FOR SOLUTION INTRAVENOUS at 18:30

## 2018-03-04 RX ADMIN — VANCOMYCIN HYDROCHLORIDE SCH MLS/HR: 1 INJECTION, POWDER, LYOPHILIZED, FOR SOLUTION INTRAVENOUS at 06:40

## 2018-03-04 RX ADMIN — PROMETHAZINE HYDROCHLORIDE PRN MLS/HR: 25 INJECTION INTRAMUSCULAR; INTRAVENOUS at 04:45

## 2018-03-04 RX ADMIN — VANCOMYCIN HYDROCHLORIDE PRN EACH: 1 INJECTION, POWDER, LYOPHILIZED, FOR SOLUTION INTRAVENOUS at 15:53

## 2018-03-04 RX ADMIN — PROMETHAZINE HYDROCHLORIDE PRN MLS/HR: 25 INJECTION INTRAMUSCULAR; INTRAVENOUS at 10:45

## 2018-03-04 RX ADMIN — POTASSIUM CHLORIDE SCH MLS/HR: 200 INJECTION, SOLUTION INTRAVENOUS at 10:02

## 2018-03-04 RX ADMIN — PIPERACILLIN SODIUM AND TAZOBACTAM SODIUM SCH MLS/HR: 3; .375 INJECTION, POWDER, LYOPHILIZED, FOR SOLUTION INTRAVENOUS at 00:00

## 2018-03-04 RX ADMIN — ALBUTEROL SULFATE SCH MG: 108 AEROSOL, METERED RESPIRATORY (INHALATION) at 15:07

## 2018-03-04 RX ADMIN — ENOXAPARIN SODIUM SCH MG: 100 INJECTION SUBCUTANEOUS at 05:58

## 2018-03-04 RX ADMIN — SODIUM CHLORIDE AND POTASSIUM CHLORIDE SCH MLS/HR: 9; 2.98 INJECTION, SOLUTION INTRAVENOUS at 18:06

## 2018-03-04 RX ADMIN — PIPERACILLIN SODIUM AND TAZOBACTAM SODIUM SCH MLS/HR: 3; .375 INJECTION, POWDER, LYOPHILIZED, FOR SOLUTION INTRAVENOUS at 12:00

## 2018-03-04 RX ADMIN — HYDROCORTISONE SODIUM SUCCINATE SCH MG: 100 INJECTION, POWDER, FOR SOLUTION INTRAMUSCULAR; INTRAVENOUS at 21:20

## 2018-03-04 RX ADMIN — HYDROCORTISONE SODIUM SUCCINATE SCH MG: 100 INJECTION, POWDER, FOR SOLUTION INTRAMUSCULAR; INTRAVENOUS at 13:59

## 2018-03-04 RX ADMIN — HYDROMORPHONE HYDROCHLORIDE PRN MG: 2 INJECTION, SOLUTION INTRAMUSCULAR; INTRAVENOUS; SUBCUTANEOUS at 08:44

## 2018-03-04 RX ADMIN — HYDROMORPHONE HYDROCHLORIDE PRN MG: 1 INJECTION, SOLUTION INTRAMUSCULAR; INTRAVENOUS; SUBCUTANEOUS at 02:03

## 2018-03-04 RX ADMIN — HYDROMORPHONE HYDROCHLORIDE PRN MG: 1 INJECTION, SOLUTION INTRAMUSCULAR; INTRAVENOUS; SUBCUTANEOUS at 05:59

## 2018-03-04 RX ADMIN — ALBUTEROL SULFATE SCH MG: 108 AEROSOL, METERED RESPIRATORY (INHALATION) at 20:10

## 2018-03-04 RX ADMIN — HYDROMORPHONE HYDROCHLORIDE PRN MG: 2 INJECTION, SOLUTION INTRAMUSCULAR; INTRAVENOUS; SUBCUTANEOUS at 18:07

## 2018-03-04 RX ADMIN — PIPERACILLIN SODIUM AND TAZOBACTAM SODIUM SCH MLS/HR: 3; .375 INJECTION, POWDER, LYOPHILIZED, FOR SOLUTION INTRAVENOUS at 05:58

## 2018-03-04 RX ADMIN — HYDROCORTISONE SODIUM SUCCINATE SCH MG: 100 INJECTION, POWDER, FOR SOLUTION INTRAMUSCULAR; INTRAVENOUS at 05:58

## 2018-03-04 RX ADMIN — VANCOMYCIN HYDROCHLORIDE SCH MLS/HR: 1 INJECTION, POWDER, LYOPHILIZED, FOR SOLUTION INTRAVENOUS at 21:22

## 2018-03-04 RX ADMIN — ALBUTEROL SULFATE SCH MG: 108 AEROSOL, METERED RESPIRATORY (INHALATION) at 05:40

## 2018-03-04 RX ADMIN — ALBUTEROL SULFATE SCH MG: 108 AEROSOL, METERED RESPIRATORY (INHALATION) at 09:12

## 2018-03-04 RX ADMIN — FAMOTIDINE SCH MG: 10 INJECTION, SOLUTION INTRAVENOUS at 21:21

## 2018-03-04 RX ADMIN — SODIUM CHLORIDE AND POTASSIUM CHLORIDE SCH MLS/HR: .9; .15 SOLUTION INTRAVENOUS at 14:21

## 2018-03-04 RX ADMIN — PANTOPRAZOLE SODIUM SCH MG: 40 TABLET, DELAYED RELEASE ORAL at 12:01

## 2018-03-04 RX ADMIN — HYDROMORPHONE HYDROCHLORIDE PRN MG: 2 INJECTION, SOLUTION INTRAMUSCULAR; INTRAVENOUS; SUBCUTANEOUS at 14:52

## 2018-03-04 RX ADMIN — HYDROMORPHONE HYDROCHLORIDE PRN MG: 2 INJECTION, SOLUTION INTRAMUSCULAR; INTRAVENOUS; SUBCUTANEOUS at 21:26

## 2018-03-04 RX ADMIN — FAMOTIDINE SCH MG: 10 INJECTION, SOLUTION INTRAVENOUS at 08:41

## 2018-03-04 RX ADMIN — VANCOMYCIN HYDROCHLORIDE SCH MLS/HR: 1 INJECTION, POWDER, LYOPHILIZED, FOR SOLUTION INTRAVENOUS at 15:57

## 2018-03-04 RX ADMIN — METOPROLOL TARTRATE SCH MG: 25 TABLET, FILM COATED ORAL at 09:46

## 2018-03-04 NOTE — NUR
Pharmacy Vancomycin Dosing Note



S:Consulted to monitor and dose vancomycin started 03/02/18.



O:DENNISE PATE is a 22 year old F with 

Sepsis SIRS .



Height: 5 feet, 6.5 inches

Weight: 52.206144 kg

Ideal Body Weight: 198.45 

Adjusted Body Weight: 139.95 

Dosing Weight: Adjusted



Other Antibiotics: 

ZOSYN 3.375 Q6H

LEVAQUIN



LABS:

Last BUN: 3 

Last Creatinine: 0.4 

Creatinine Clearance: 146.83 

Last WBC: 4.7 

Last Platelets: 142 

Tmax (past 24 hours): 



Microbiology: 



I/O: 



Drug Levels:

Last Trough level: 11.7  on 03/04/18 at 1330 

Last dose given 03/04/18 at 0600 



Vancomycin Dosing:

Loading Dose: 1000 mg x1

Dosing Weight: Adjusted

Target Trough: 15-20





A: Based on: Trough





P: 1. Continue Vancomycin 1000 mg IV q8h

   2. Follow up Trough level on 03/05/18 at 2130 

   3. Pharmacy will continue to monitor, follow and adjust therapy as needed.

 

 MALIK VILLELA,  03/04/18 2401

## 2018-03-04 NOTE — PN
DATE:  03/04/2018



SUBJECTIVE:  The patient is resting, slightly propped up in bed, awake, alert,

continued to complain of back pain and able to lie flat on her back, continued

to have difficulty swallowing; however, she is afebrile and otherwise

hemodynamically stable.  Nursing staff were concerned about her hypokalemia as

her potassium dropped down to 2.9.



OBJECTIVE:

GENERAL:  When I examined her, she looked pale, but no jaundice, cyanosis, or

thyromegaly.  No jugular venous distension.  No limb edema.

VITAL SIGNS:  Her heart rate was 73, blood pressure 128/83, temperature was

98.8, respiratory rate was 16, and oxygen saturation was 98% on room air.

HEAD, EYES, EARS, NOSE AND THROAT:  Showed normocephalic, atraumatic.

NECK:  Supple.

HEART:  Showed normal first and second heart sounds.  No gallop, rub or murmur.

CHEST:  Clear to auscultation.  No crepitation or rhonchi.

ABDOMEN:  Distended, soft, nontender.

NEUROLOGIC:  She is awake, alert, responding appropriately.  All cranial nerves

intact.  She moves the right upper extremity to a greater extent than her left

upper extremity.  She has paraplegia.  She is mostly bedbound, chair bound.  

She has neurogenic bladder requiring indwelling Carr catheter.  Her intake was

900, output was 2300.



LABORATORY DATA:  Showed a white cell count of 4700, hemoglobin 10, hematocrit

29, MCV 87, and platelet count of 142,000.  Her chemistry showed a serum sodium

141, potassium 2.9, chloride 108, bicarbonate 25, anion gap of 8, BUN 3,

creatinine 0.4, estimated GFR was 199 mL per minute.  Her glucose was 110,

calcium was 8.2, magnesium was 1.9.  Her urine culture has grown more than

100,000 colony forming units per mL of gram-negative rods.  The identification

and sensitivity is still pending at the time of this dictation.  Her blood

culture has grown gram-positive cocci in chains, seen in 2/2.  Again, the

identification and sensitivity is still pending.



ASSESSMENT:

1.  Sepsis with multiple possible potential source of infection including her

urinary tract infection, aspiration pneumonia, she has a Port-A-Cath that was a

potential also source of infection.  CT scan showed fluid in her esophagus and

her mom stated that she has esophageal stricture.

2.  She has already grown gram-positive cocci and gram-negative rods. 

Unfortunately, the identification and sensitivity is still pending at the time

of this dictation,  

3.  Hypomagnesemia, resolved.

4.  Adrenal insufficiency, on stress dose of steroids.

5.  Neurogenic bladder, requiring indwelling Carr catheter.  

6.  She has history of deep venous thrombosis; however, Doppler ultrasound and a

CT angio revealed no evidence of deep venous thrombosis or pulmonary embolism.



PLAN:

1.  My plan is to continue with antibiotic in the form of vancomycin, Zosyn and

Levaquin.

2.  Continue with deep venous thrombosis prophylaxis in the form of Lovenox 40

mg subq once a day.  Continue with pain medication in the form of hydromorphone.

3.  Continue with the hydrocortisone for her stress dose of [place]'s disease.  

4.  Continue with albuterol sulfate as well as budesonide.  She is known to have

bronchial asthma.  

5.  Once we have the identification and sensitivity, antibiotics can be

deescalated.





______________________________

MADISON MACKAY MD



DR:  IVANA/clare  JOB#:  [id number] / [id number]

DD:  03/04/2018 13:21  DT:  03/04/2018 14:36

## 2018-03-05 VITALS — SYSTOLIC BLOOD PRESSURE: 128 MMHG | DIASTOLIC BLOOD PRESSURE: 91 MMHG

## 2018-03-05 VITALS — SYSTOLIC BLOOD PRESSURE: 138 MMHG | DIASTOLIC BLOOD PRESSURE: 88 MMHG

## 2018-03-05 VITALS — SYSTOLIC BLOOD PRESSURE: 151 MMHG | DIASTOLIC BLOOD PRESSURE: 110 MMHG

## 2018-03-05 VITALS — DIASTOLIC BLOOD PRESSURE: 82 MMHG | SYSTOLIC BLOOD PRESSURE: 121 MMHG

## 2018-03-05 VITALS — DIASTOLIC BLOOD PRESSURE: 99 MMHG | SYSTOLIC BLOOD PRESSURE: 140 MMHG

## 2018-03-05 VITALS — SYSTOLIC BLOOD PRESSURE: 141 MMHG | DIASTOLIC BLOOD PRESSURE: 98 MMHG

## 2018-03-05 LAB
ANION GAP SERPL CALC-SCNC: 7 MMOL/L (ref 6–14)
BGAS PCO2: 38 MMHG (ref 35–45)
BGAS PH: 7.41 (ref 7.35–7.45)
BGAS PO2: 56 MMHG (ref 80–100)
CA-I SERPL ISE-MCNC: 4 MG/DL (ref 7–20)
CALCIUM SERPL-MCNC: 8.3 MG/DL (ref 8.5–10.1)
CHLORIDE SERPL-SCNC: 110 MMOL/L (ref 98–107)
CO2 SERPL-SCNC: 26 MMOL/L (ref 21–32)
CREAT SERPL-MCNC: 0.5 MG/DL (ref 0.6–1)
DELTA BASE BGAS: 0 MMOL/L (ref 0–3)
ERYTHROCYTE [DISTWIDTH] IN BLOOD BY AUTOMATED COUNT: 13.3 % (ref 11.5–14.5)
GFR SERPLBLD BASED ON 1.73 SQ M-ARVRAT: 154.3 ML/MIN
GLUCOSE SERPL-MCNC: 103 MG/DL (ref 70–99)
HCT VFR BLD CALC: 29.7 % (ref 36–47)
HGB BLD-MCNC: 10 G/DL (ref 12–15.5)
MCH RBC QN AUTO: 30 PG (ref 25–35)
MCHC RBC AUTO-ENTMCNC: 34 G/DL (ref 31–37)
MCV RBC AUTO: 88 FL (ref 79–100)
O2 SAT BGAS: 88 % (ref 92–99)
O2/TOTAL GAS SETTING VFR VENT: 40 %
PLATELET # BLD AUTO: 164 X10^3/UL (ref 140–400)
POTASSIUM SERPL-SCNC: 3.8 MMOL/L (ref 3.5–5.1)
RBC # BLD AUTO: 3.38 X10^6/UL (ref 3.5–5.4)
SODIUM SERPL-SCNC: 143 MMOL/L (ref 136–145)
VANC TR: 17.4 MCG/ML (ref 10–20)
WBC # BLD AUTO: 5.6 X10^3/UL (ref 4–11)

## 2018-03-05 RX ADMIN — PIPERACILLIN SODIUM AND TAZOBACTAM SODIUM SCH MLS/HR: 3; .375 INJECTION, POWDER, LYOPHILIZED, FOR SOLUTION INTRAVENOUS at 05:53

## 2018-03-05 RX ADMIN — PIPERACILLIN SODIUM AND TAZOBACTAM SODIUM SCH MLS/HR: 3; .375 INJECTION, POWDER, LYOPHILIZED, FOR SOLUTION INTRAVENOUS at 00:13

## 2018-03-05 RX ADMIN — PROMETHAZINE HYDROCHLORIDE PRN MLS/HR: 25 INJECTION INTRAMUSCULAR; INTRAVENOUS at 18:09

## 2018-03-05 RX ADMIN — HYDROMORPHONE HYDROCHLORIDE PRN MG: 2 INJECTION, SOLUTION INTRAMUSCULAR; INTRAVENOUS; SUBCUTANEOUS at 17:07

## 2018-03-05 RX ADMIN — PANTOPRAZOLE SODIUM SCH MG: 40 TABLET, DELAYED RELEASE ORAL at 12:06

## 2018-03-05 RX ADMIN — VANCOMYCIN HYDROCHLORIDE SCH MLS/HR: 1 INJECTION, POWDER, LYOPHILIZED, FOR SOLUTION INTRAVENOUS at 07:23

## 2018-03-05 RX ADMIN — BUDESONIDE SCH MG: 0.5 SUSPENSION RESPIRATORY (INHALATION) at 09:24

## 2018-03-05 RX ADMIN — VANCOMYCIN HYDROCHLORIDE SCH MLS/HR: 1 INJECTION, POWDER, LYOPHILIZED, FOR SOLUTION INTRAVENOUS at 22:38

## 2018-03-05 RX ADMIN — HYDROMORPHONE HYDROCHLORIDE PRN MG: 2 INJECTION, SOLUTION INTRAMUSCULAR; INTRAVENOUS; SUBCUTANEOUS at 00:13

## 2018-03-05 RX ADMIN — METOPROLOL TARTRATE SCH MG: 25 TABLET, FILM COATED ORAL at 21:16

## 2018-03-05 RX ADMIN — ENOXAPARIN SODIUM SCH MG: 100 INJECTION SUBCUTANEOUS at 05:51

## 2018-03-05 RX ADMIN — HYDROCORTISONE SODIUM SUCCINATE SCH MG: 100 INJECTION, POWDER, FOR SOLUTION INTRAMUSCULAR; INTRAVENOUS at 05:51

## 2018-03-05 RX ADMIN — HYDROMORPHONE HYDROCHLORIDE PRN MG: 2 INJECTION, SOLUTION INTRAMUSCULAR; INTRAVENOUS; SUBCUTANEOUS at 03:24

## 2018-03-05 RX ADMIN — PIPERACILLIN SODIUM AND TAZOBACTAM SODIUM SCH MLS/HR: 3; .375 INJECTION, POWDER, LYOPHILIZED, FOR SOLUTION INTRAVENOUS at 17:06

## 2018-03-05 RX ADMIN — PIPERACILLIN SODIUM AND TAZOBACTAM SODIUM SCH MLS/HR: 3; .375 INJECTION, POWDER, LYOPHILIZED, FOR SOLUTION INTRAVENOUS at 12:07

## 2018-03-05 RX ADMIN — HYDROMORPHONE HYDROCHLORIDE PRN MG: 2 INJECTION, SOLUTION INTRAMUSCULAR; INTRAVENOUS; SUBCUTANEOUS at 22:41

## 2018-03-05 RX ADMIN — ALBUTEROL SULFATE SCH MG: 108 AEROSOL, METERED RESPIRATORY (INHALATION) at 11:20

## 2018-03-05 RX ADMIN — ALBUTEROL SULFATE SCH MG: 108 AEROSOL, METERED RESPIRATORY (INHALATION) at 16:11

## 2018-03-05 RX ADMIN — SODIUM CHLORIDE AND POTASSIUM CHLORIDE SCH MLS/HR: 9; 2.98 INJECTION, SOLUTION INTRAVENOUS at 09:25

## 2018-03-05 RX ADMIN — METOPROLOL TARTRATE SCH MG: 25 TABLET, FILM COATED ORAL at 09:23

## 2018-03-05 RX ADMIN — ALBUTEROL SULFATE SCH MG: 108 AEROSOL, METERED RESPIRATORY (INHALATION) at 21:20

## 2018-03-05 RX ADMIN — PROMETHAZINE HYDROCHLORIDE PRN MLS/HR: 25 INJECTION INTRAMUSCULAR; INTRAVENOUS at 05:53

## 2018-03-05 RX ADMIN — BUDESONIDE SCH MG: 0.5 SUSPENSION RESPIRATORY (INHALATION) at 21:20

## 2018-03-05 RX ADMIN — FAMOTIDINE SCH MG: 10 INJECTION, SOLUTION INTRAVENOUS at 21:17

## 2018-03-05 RX ADMIN — SODIUM CHLORIDE AND POTASSIUM CHLORIDE SCH MLS/HR: 9; 2.98 INJECTION, SOLUTION INTRAVENOUS at 09:24

## 2018-03-05 RX ADMIN — PROMETHAZINE HYDROCHLORIDE PRN MLS/HR: 25 INJECTION INTRAMUSCULAR; INTRAVENOUS at 00:14

## 2018-03-05 RX ADMIN — ALBUTEROL SULFATE SCH MG: 108 AEROSOL, METERED RESPIRATORY (INHALATION) at 05:47

## 2018-03-05 RX ADMIN — HYDROMORPHONE HYDROCHLORIDE PRN MG: 2 INJECTION, SOLUTION INTRAMUSCULAR; INTRAVENOUS; SUBCUTANEOUS at 05:52

## 2018-03-05 RX ADMIN — HYDROCORTISONE SODIUM SUCCINATE SCH MG: 100 INJECTION, POWDER, FOR SOLUTION INTRAMUSCULAR; INTRAVENOUS at 13:57

## 2018-03-05 RX ADMIN — HYDROMORPHONE HYDROCHLORIDE PRN MG: 2 INJECTION, SOLUTION INTRAMUSCULAR; INTRAVENOUS; SUBCUTANEOUS at 12:14

## 2018-03-05 RX ADMIN — HYDROCORTISONE SODIUM SUCCINATE SCH MG: 100 INJECTION, POWDER, FOR SOLUTION INTRAMUSCULAR; INTRAVENOUS at 22:41

## 2018-03-05 RX ADMIN — VANCOMYCIN HYDROCHLORIDE SCH MLS/HR: 1 INJECTION, POWDER, LYOPHILIZED, FOR SOLUTION INTRAVENOUS at 13:58

## 2018-03-05 RX ADMIN — FAMOTIDINE SCH MG: 10 INJECTION, SOLUTION INTRAVENOUS at 09:23

## 2018-03-05 NOTE — RAD
Portable AP upright view CXR:



Clinical indications: Low O2 saturation.  Difficulty breathing.  It started

this morning.



Comparison: September 7, 2017.



Findings: Infiltrate is present within the left lung base.  Kerley B-lines are

seen within the right lateral costophrenic angle.  Typically seen with

interstitial pulmonary edema.  No pleural effusion or pneumothorax is seen. 

Right IJ Port-A-Cath is in place and the tip is seen within the lower SVC

above the level of the right atrium.  The heart size, pulmonary vasculature,

mediastinum and both kim are unremarkable. 



Impression: Left lung base infiltrate.



Kerley B lines are seen within the right lateral costophrenic angle typically

noted with interstitial pulmonary edema..

## 2018-03-06 VITALS — SYSTOLIC BLOOD PRESSURE: 119 MMHG | DIASTOLIC BLOOD PRESSURE: 87 MMHG

## 2018-03-06 VITALS — SYSTOLIC BLOOD PRESSURE: 128 MMHG | DIASTOLIC BLOOD PRESSURE: 97 MMHG

## 2018-03-06 VITALS — SYSTOLIC BLOOD PRESSURE: 129 MMHG | DIASTOLIC BLOOD PRESSURE: 89 MMHG

## 2018-03-06 VITALS — DIASTOLIC BLOOD PRESSURE: 97 MMHG | SYSTOLIC BLOOD PRESSURE: 148 MMHG

## 2018-03-06 VITALS — DIASTOLIC BLOOD PRESSURE: 96 MMHG | SYSTOLIC BLOOD PRESSURE: 134 MMHG

## 2018-03-06 VITALS — SYSTOLIC BLOOD PRESSURE: 105 MMHG | DIASTOLIC BLOOD PRESSURE: 75 MMHG

## 2018-03-06 LAB
ALBUMIN SERPL-MCNC: 2.6 G/DL (ref 3.4–5)
ALBUMIN/GLOB SERPL: 0.7 {RATIO} (ref 1–1.7)
ALP SERPL-CCNC: 39 U/L (ref 46–116)
ALT SERPL-CCNC: 70 U/L (ref 14–59)
ANION GAP SERPL CALC-SCNC: 7 MMOL/L (ref 6–14)
AST SERPL-CCNC: 50 U/L (ref 15–37)
BASOPHILS # BLD AUTO: 0 X10^3/UL (ref 0–0.2)
BASOPHILS NFR BLD: 0 % (ref 0–3)
BILIRUB SERPL-MCNC: 0.3 MG/DL (ref 0.2–1)
BUN/CREAT SERPL: 10 (ref 6–20)
CA-I SERPL ISE-MCNC: 6 MG/DL (ref 7–20)
CALCIUM SERPL-MCNC: 8.3 MG/DL (ref 8.5–10.1)
CHLORIDE SERPL-SCNC: 103 MMOL/L (ref 98–107)
CO2 SERPL-SCNC: 32 MMOL/L (ref 21–32)
CREAT SERPL-MCNC: 0.6 MG/DL (ref 0.6–1)
EOSINOPHIL NFR BLD: 0 % (ref 0–3)
EOSINOPHIL NFR BLD: 0 X10^3/UL (ref 0–0.7)
ERYTHROCYTE [DISTWIDTH] IN BLOOD BY AUTOMATED COUNT: 13.1 % (ref 11.5–14.5)
GFR SERPLBLD BASED ON 1.73 SQ M-ARVRAT: 125 ML/MIN
GLOBULIN SER-MCNC: 3.6 G/DL (ref 2.2–3.8)
GLUCOSE SERPL-MCNC: 106 MG/DL (ref 70–99)
HCT VFR BLD CALC: 32.2 % (ref 36–47)
HGB BLD-MCNC: 11.1 G/DL (ref 12–15.5)
LYMPHOCYTES # BLD: 0.8 X10^3/UL (ref 1–4.8)
LYMPHOCYTES NFR BLD AUTO: 13 % (ref 24–48)
MAGNESIUM SERPL-MCNC: 1.7 MG/DL (ref 1.8–2.4)
MCH RBC QN AUTO: 30 PG (ref 25–35)
MCHC RBC AUTO-ENTMCNC: 34 G/DL (ref 31–37)
MCV RBC AUTO: 87 FL (ref 79–100)
MONO #: 0.6 X10^3/UL (ref 0–1.1)
MONOCYTES NFR BLD: 10 % (ref 0–9)
NEUT #: 5 X10^3UL (ref 1.8–7.7)
NEUTROPHILS NFR BLD AUTO: 78 % (ref 31–73)
PLATELET # BLD AUTO: 186 X10^3/UL (ref 140–400)
POTASSIUM SERPL-SCNC: 2.6 MMOL/L (ref 3.5–5.1)
PROT SERPL-MCNC: 6.2 G/DL (ref 6.4–8.2)
RBC # BLD AUTO: 3.72 X10^6/UL (ref 3.5–5.4)
SODIUM SERPL-SCNC: 142 MMOL/L (ref 136–145)
WBC # BLD AUTO: 6.4 X10^3/UL (ref 4–11)

## 2018-03-06 RX ADMIN — PROMETHAZINE HYDROCHLORIDE PRN MLS/HR: 25 INJECTION INTRAMUSCULAR; INTRAVENOUS at 01:33

## 2018-03-06 RX ADMIN — VANCOMYCIN HYDROCHLORIDE SCH MLS/HR: 1 INJECTION, POWDER, LYOPHILIZED, FOR SOLUTION INTRAVENOUS at 14:18

## 2018-03-06 RX ADMIN — ALBUTEROL SULFATE SCH MG: 108 AEROSOL, METERED RESPIRATORY (INHALATION) at 05:49

## 2018-03-06 RX ADMIN — HYDROMORPHONE HYDROCHLORIDE PRN MG: 2 INJECTION, SOLUTION INTRAMUSCULAR; INTRAVENOUS; SUBCUTANEOUS at 11:30

## 2018-03-06 RX ADMIN — VANCOMYCIN HYDROCHLORIDE SCH MLS/HR: 1 INJECTION, POWDER, LYOPHILIZED, FOR SOLUTION INTRAVENOUS at 05:39

## 2018-03-06 RX ADMIN — HYDROMORPHONE HYDROCHLORIDE PRN MG: 2 INJECTION, SOLUTION INTRAMUSCULAR; INTRAVENOUS; SUBCUTANEOUS at 19:35

## 2018-03-06 RX ADMIN — ALBUTEROL SULFATE SCH MG: 108 AEROSOL, METERED RESPIRATORY (INHALATION) at 09:14

## 2018-03-06 RX ADMIN — PANTOPRAZOLE SODIUM SCH MG: 40 TABLET, DELAYED RELEASE ORAL at 12:30

## 2018-03-06 RX ADMIN — VANCOMYCIN HYDROCHLORIDE SCH MLS/HR: 1 INJECTION, POWDER, LYOPHILIZED, FOR SOLUTION INTRAVENOUS at 21:56

## 2018-03-06 RX ADMIN — ENOXAPARIN SODIUM SCH MG: 100 INJECTION SUBCUTANEOUS at 05:33

## 2018-03-06 RX ADMIN — FAMOTIDINE SCH MG: 10 INJECTION, SOLUTION INTRAVENOUS at 20:53

## 2018-03-06 RX ADMIN — FAMOTIDINE SCH MG: 10 INJECTION, SOLUTION INTRAVENOUS at 07:51

## 2018-03-06 RX ADMIN — PIPERACILLIN SODIUM AND TAZOBACTAM SODIUM SCH MLS/HR: 3; .375 INJECTION, POWDER, LYOPHILIZED, FOR SOLUTION INTRAVENOUS at 18:12

## 2018-03-06 RX ADMIN — PIPERACILLIN SODIUM AND TAZOBACTAM SODIUM SCH MLS/HR: 3; .375 INJECTION, POWDER, LYOPHILIZED, FOR SOLUTION INTRAVENOUS at 12:30

## 2018-03-06 RX ADMIN — BUDESONIDE SCH MG: 0.5 SUSPENSION RESPIRATORY (INHALATION) at 21:35

## 2018-03-06 RX ADMIN — POTASSIUM CHLORIDE SCH MLS/HR: 200 INJECTION, SOLUTION INTRAVENOUS at 10:15

## 2018-03-06 RX ADMIN — HYDROCORTISONE SODIUM SUCCINATE SCH MG: 100 INJECTION, POWDER, FOR SOLUTION INTRAMUSCULAR; INTRAVENOUS at 21:55

## 2018-03-06 RX ADMIN — METOPROLOL TARTRATE SCH MG: 25 TABLET, FILM COATED ORAL at 11:30

## 2018-03-06 RX ADMIN — HYDROCORTISONE SODIUM SUCCINATE SCH MG: 100 INJECTION, POWDER, FOR SOLUTION INTRAMUSCULAR; INTRAVENOUS at 05:32

## 2018-03-06 RX ADMIN — PROMETHAZINE HYDROCHLORIDE PRN MLS/HR: 25 INJECTION INTRAMUSCULAR; INTRAVENOUS at 19:35

## 2018-03-06 RX ADMIN — HYDROMORPHONE HYDROCHLORIDE PRN MG: 2 INJECTION, SOLUTION INTRAMUSCULAR; INTRAVENOUS; SUBCUTANEOUS at 05:54

## 2018-03-06 RX ADMIN — PIPERACILLIN SODIUM AND TAZOBACTAM SODIUM SCH MLS/HR: 3; .375 INJECTION, POWDER, LYOPHILIZED, FOR SOLUTION INTRAVENOUS at 05:09

## 2018-03-06 RX ADMIN — METOPROLOL TARTRATE SCH MG: 25 TABLET, FILM COATED ORAL at 20:52

## 2018-03-06 RX ADMIN — PIPERACILLIN SODIUM AND TAZOBACTAM SODIUM SCH MLS/HR: 3; .375 INJECTION, POWDER, LYOPHILIZED, FOR SOLUTION INTRAVENOUS at 00:16

## 2018-03-06 RX ADMIN — PROMETHAZINE HYDROCHLORIDE PRN MLS/HR: 25 INJECTION INTRAMUSCULAR; INTRAVENOUS at 07:51

## 2018-03-06 RX ADMIN — HYDROMORPHONE HYDROCHLORIDE PRN MG: 2 INJECTION, SOLUTION INTRAMUSCULAR; INTRAVENOUS; SUBCUTANEOUS at 16:55

## 2018-03-06 RX ADMIN — ALBUTEROL SULFATE SCH MG: 108 AEROSOL, METERED RESPIRATORY (INHALATION) at 14:56

## 2018-03-06 RX ADMIN — POTASSIUM CHLORIDE SCH MLS/HR: 200 INJECTION, SOLUTION INTRAVENOUS at 08:08

## 2018-03-06 RX ADMIN — HYDROCORTISONE SODIUM SUCCINATE SCH MG: 100 INJECTION, POWDER, FOR SOLUTION INTRAMUSCULAR; INTRAVENOUS at 14:17

## 2018-03-06 RX ADMIN — ALBUTEROL SULFATE SCH MG: 108 AEROSOL, METERED RESPIRATORY (INHALATION) at 21:35

## 2018-03-06 RX ADMIN — BUDESONIDE SCH MG: 0.5 SUSPENSION RESPIRATORY (INHALATION) at 09:14

## 2018-03-06 RX ADMIN — PIPERACILLIN SODIUM AND TAZOBACTAM SODIUM SCH MLS/HR: 3; .375 INJECTION, POWDER, LYOPHILIZED, FOR SOLUTION INTRAVENOUS at 23:59

## 2018-03-06 NOTE — PN
DATE:  03/05/2018



CURRENT PROBLEMS:

1.  Sepsis with urinary tract infection.

2.  Sepsis with left upper lobe pneumonia.

3.  Sepsis with Gram-positive bacteremia, but this may be a contaminant.

4.  Hypomagnesemia.

5.  Adrenal insufficiency.

6.  Neurogenic bladder with Carr.

7.  Abdominal pain.

8.  Fluid overload with pulmonary edema.

9.  Acute hypoxic respiratory failure.

10.  Deep vein thrombosis prophylaxis, on Lovenox.

11.  MRSA screen positive.



SUBJECTIVE:  This is a 22-year-old female who was admitted for sepsis.  This

morning she became acutely short of breath.  Her saturations were down in the

80s and chest x-ray showing pulmonary edema.  She was given 20 mg of Lasix as

well as 1-hour breathing treatment and recovered very nicely, sats are back up

now.  She did have some good output from the Lasix and will need to cut back on

her IV fluids.  Labs, spoke to me regarding the gram-positive cocci in her blood

cultures and this may be a contaminant, but we will pursue identification.



OBJECTIVE:

VITAL SIGNS:  Blood pressure 121/82, pulse 75, respirations 14, temperature

99.1, pulse ox is 95% on room air.

GENERAL:  Her color is pale.  She looks a little puffy.  She is up 8 pounds.

LUNGS:  With crackles in the left base.

CARDIOVASCULAR:  Regular rhythm and rate.

ABDOMEN:  Soft, mildly tender in the lower quadrants.

EXTREMITIES:  Without edema.  Poor muscle tone and development noted.



LABORATORY DATA:  Today, white blood cell count is 5.6, hemoglobin 10,

hematocrit 29.7.  ABG this morning at 6:20, her pO2 was 56 with a sat of 88. 

Other values were normal.  BMP very delusional appearing BUN of 4, creatinine of

0.5, potassium 3.8.  MRSA screen is positive.



PLAN:  Monitor for fluid overload.  Await the identification on the blood

cultures, but may be a contaminant.  Continue the IV antibiotics for now.  They

give her another dose of Lasix later on today and monitor closely.





______________________________

LIZ OVALLES DO



DR:  SAAD/clare  JOB#:  [id number] / [id number]

DD:  03/05/2018 13:51  DT:  03/06/2018 02:01

## 2018-03-06 NOTE — PN
DATE:  03/06/2018



CURRENT PROBLEMS:

1. Sepsis with urinary tract infection, gram-negative.

2. Sepsis with left upper lobe pneumonia, probably aspiration.

3. Sepsis with Gram-positive bacteremia growing out Strep viridans.

4. Hypomagnesemia.

5. Adrenal insufficiency.

6. Neurogenic bladder with a Carr catheter.

7. Abdominal pain.

8. Fluid overload, pulmonary edema.  Good response to Lasix.

9. Acute hypoxic respiratory failure, resolved.

10. Deep vein thrombosis prophylaxis.

11.  Methicillin-resistant Staphylococcus aureus screen positive.



The patient is doing better today, did very well with the Lasix IV, a amount of

apparently very dilute urine over 6000 mL.  Much less short of breath now.  The

patient is still on antibiotics.  Discontinue the IV fluids.  She is drinking

some, but probably has an esophageal stricture and will need an EGD, which

mother plans on having done as an outpatient.



OBJECTIVE:

VITAL SIGNS:  Blood pressure 128/97, pulse 66, respirations 13, pulse ox 96% on

room air.

GENERAL:  Puffiness has improved in her face.  Color is pale.

HEENT:  Tongue moist.

NECK:  Supple.

LUNGS:  Clear without crackles or wheezes.

CARDIOVASCULAR:  Regular rhythm and rate.

ABDOMEN:  Soft, mild midepigastric tenderness.

GENITOURINARY:  Carr catheter draining clear yellow urine.



LABORATORY DATA:  Today, CBC is unremarkable.  Chemistry:  Potassium was 2.6. 

Liver function tests slightly elevated.  Albumin 2.6.



PLAN:  Move toward discharge, wait for the sensitivity on the culture.





______________________________

LIZ OVALLES DO



DR:  SAAD/clare  JOB#:  [id number] / [id number]

DD:  03/06/2018 13:59  DT:  03/06/2018 18:40

## 2018-03-07 VITALS — DIASTOLIC BLOOD PRESSURE: 80 MMHG | SYSTOLIC BLOOD PRESSURE: 127 MMHG

## 2018-03-07 VITALS — SYSTOLIC BLOOD PRESSURE: 116 MMHG | DIASTOLIC BLOOD PRESSURE: 74 MMHG

## 2018-03-07 VITALS — SYSTOLIC BLOOD PRESSURE: 131 MMHG | DIASTOLIC BLOOD PRESSURE: 88 MMHG

## 2018-03-07 VITALS — DIASTOLIC BLOOD PRESSURE: 86 MMHG | SYSTOLIC BLOOD PRESSURE: 126 MMHG

## 2018-03-07 VITALS — DIASTOLIC BLOOD PRESSURE: 76 MMHG | SYSTOLIC BLOOD PRESSURE: 112 MMHG

## 2018-03-07 VITALS — DIASTOLIC BLOOD PRESSURE: 92 MMHG | SYSTOLIC BLOOD PRESSURE: 134 MMHG

## 2018-03-07 LAB
ALBUMIN SERPL-MCNC: 2.5 G/DL (ref 3.4–5)
ALBUMIN/GLOB SERPL: 0.7 {RATIO} (ref 1–1.7)
ALP SERPL-CCNC: 36 U/L (ref 46–116)
ALT SERPL-CCNC: 51 U/L (ref 14–59)
ANION GAP SERPL CALC-SCNC: 6 MMOL/L (ref 6–14)
ANION GAP SERPL CALC-SCNC: 7 MMOL/L (ref 6–14)
ANION GAP SERPL CALC-SCNC: 7 MMOL/L (ref 6–14)
AST SERPL-CCNC: 25 U/L (ref 15–37)
BASOPHILS # BLD AUTO: 0 X10^3/UL (ref 0–0.2)
BASOPHILS NFR BLD: 0 % (ref 0–3)
BILIRUB SERPL-MCNC: 0.3 MG/DL (ref 0.2–1)
BUN/CREAT SERPL: 9 (ref 6–20)
CA-I SERPL ISE-MCNC: 6 MG/DL (ref 7–20)
CA-I SERPL ISE-MCNC: 7 MG/DL (ref 7–20)
CA-I SERPL ISE-MCNC: 9 MG/DL (ref 7–20)
CALCIUM SERPL-MCNC: 8 MG/DL (ref 8.5–10.1)
CALCIUM SERPL-MCNC: 8 MG/DL (ref 8.5–10.1)
CALCIUM SERPL-MCNC: 8.3 MG/DL (ref 8.5–10.1)
CHLORIDE SERPL-SCNC: 102 MMOL/L (ref 98–107)
CO2 SERPL-SCNC: 30 MMOL/L (ref 21–32)
CO2 SERPL-SCNC: 32 MMOL/L (ref 21–32)
CO2 SERPL-SCNC: 32 MMOL/L (ref 21–32)
CREAT SERPL-MCNC: 0.6 MG/DL (ref 0.6–1)
CREAT SERPL-MCNC: 0.7 MG/DL (ref 0.6–1)
CREAT SERPL-MCNC: 0.9 MG/DL (ref 0.6–1)
EOSINOPHIL NFR BLD: 0 % (ref 0–3)
EOSINOPHIL NFR BLD: 0 X10^3/UL (ref 0–0.7)
ERYTHROCYTE [DISTWIDTH] IN BLOOD BY AUTOMATED COUNT: 13.2 % (ref 11.5–14.5)
GFR SERPLBLD BASED ON 1.73 SQ M-ARVRAT: 104.6 ML/MIN
GFR SERPLBLD BASED ON 1.73 SQ M-ARVRAT: 125 ML/MIN
GFR SERPLBLD BASED ON 1.73 SQ M-ARVRAT: 78.3 ML/MIN
GLOBULIN SER-MCNC: 3.5 G/DL (ref 2.2–3.8)
GLUCOSE SERPL-MCNC: 101 MG/DL (ref 70–99)
GLUCOSE SERPL-MCNC: 108 MG/DL (ref 70–99)
GLUCOSE SERPL-MCNC: 166 MG/DL (ref 70–99)
HCT VFR BLD CALC: 32.2 % (ref 36–47)
HGB BLD-MCNC: 11.2 G/DL (ref 12–15.5)
LYMPHOCYTES # BLD: 0.9 X10^3/UL (ref 1–4.8)
LYMPHOCYTES NFR BLD AUTO: 16 % (ref 24–48)
MAGNESIUM SERPL-MCNC: 1.8 MG/DL (ref 1.8–2.4)
MCH RBC QN AUTO: 30 PG (ref 25–35)
MCHC RBC AUTO-ENTMCNC: 35 G/DL (ref 31–37)
MCV RBC AUTO: 86 FL (ref 79–100)
MONO #: 0.6 X10^3/UL (ref 0–1.1)
MONOCYTES NFR BLD: 11 % (ref 0–9)
NEUT #: 3.9 X10^3UL (ref 1.8–7.7)
NEUTROPHILS NFR BLD AUTO: 73 % (ref 31–73)
PLATELET # BLD AUTO: 171 X10^3/UL (ref 140–400)
POTASSIUM SERPL-SCNC: 2.2 MMOL/L (ref 3.5–5.1)
POTASSIUM SERPL-SCNC: 2.3 MMOL/L (ref 3.5–5.1)
POTASSIUM SERPL-SCNC: 2.4 MMOL/L (ref 3.5–5.1)
PROT SERPL-MCNC: 6 G/DL (ref 6.4–8.2)
RBC # BLD AUTO: 3.74 X10^6/UL (ref 3.5–5.4)
SODIUM SERPL-SCNC: 139 MMOL/L (ref 136–145)
SODIUM SERPL-SCNC: 140 MMOL/L (ref 136–145)
SODIUM SERPL-SCNC: 141 MMOL/L (ref 136–145)
WBC # BLD AUTO: 5.4 X10^3/UL (ref 4–11)

## 2018-03-07 RX ADMIN — METOPROLOL TARTRATE SCH MG: 25 TABLET, FILM COATED ORAL at 07:54

## 2018-03-07 RX ADMIN — POTASSIUM CHLORIDE SCH MLS/HR: 200 INJECTION, SOLUTION INTRAVENOUS at 17:07

## 2018-03-07 RX ADMIN — PROMETHAZINE HYDROCHLORIDE PRN MG: 25 TABLET ORAL at 14:26

## 2018-03-07 RX ADMIN — PANTOPRAZOLE SODIUM SCH MG: 40 TABLET, DELAYED RELEASE ORAL at 14:10

## 2018-03-07 RX ADMIN — PROMETHAZINE HYDROCHLORIDE PRN MG: 25 TABLET ORAL at 20:37

## 2018-03-07 RX ADMIN — FAMOTIDINE SCH MG: 10 INJECTION, SOLUTION INTRAVENOUS at 07:55

## 2018-03-07 RX ADMIN — VANCOMYCIN HYDROCHLORIDE SCH MLS/HR: 1 INJECTION, POWDER, LYOPHILIZED, FOR SOLUTION INTRAVENOUS at 05:54

## 2018-03-07 RX ADMIN — HYDROCORTISONE SODIUM SUCCINATE SCH MG: 100 INJECTION, POWDER, FOR SOLUTION INTRAMUSCULAR; INTRAVENOUS at 05:53

## 2018-03-07 RX ADMIN — ALBUTEROL SULFATE SCH MG: 108 AEROSOL, METERED RESPIRATORY (INHALATION) at 09:23

## 2018-03-07 RX ADMIN — PIPERACILLIN SODIUM AND TAZOBACTAM SODIUM SCH MLS/HR: 3; .375 INJECTION, POWDER, LYOPHILIZED, FOR SOLUTION INTRAVENOUS at 05:17

## 2018-03-07 RX ADMIN — ALBUTEROL SULFATE SCH MG: 108 AEROSOL, METERED RESPIRATORY (INHALATION) at 05:45

## 2018-03-07 RX ADMIN — HYDROMORPHONE HYDROCHLORIDE PRN MG: 2 INJECTION, SOLUTION INTRAMUSCULAR; INTRAVENOUS; SUBCUTANEOUS at 14:26

## 2018-03-07 RX ADMIN — METOPROLOL TARTRATE SCH MG: 25 TABLET, FILM COATED ORAL at 20:38

## 2018-03-07 RX ADMIN — ENOXAPARIN SODIUM SCH MG: 100 INJECTION SUBCUTANEOUS at 05:54

## 2018-03-07 RX ADMIN — PROMETHAZINE HYDROCHLORIDE PRN MG: 25 TABLET ORAL at 07:54

## 2018-03-07 RX ADMIN — HYDROMORPHONE HYDROCHLORIDE PRN MG: 2 INJECTION, SOLUTION INTRAMUSCULAR; INTRAVENOUS; SUBCUTANEOUS at 04:04

## 2018-03-07 RX ADMIN — BUDESONIDE SCH MG: 0.5 SUSPENSION RESPIRATORY (INHALATION) at 09:23

## 2018-03-07 RX ADMIN — HYDROCORTISONE SODIUM SUCCINATE SCH MG: 100 INJECTION, POWDER, FOR SOLUTION INTRAMUSCULAR; INTRAVENOUS at 20:38

## 2018-03-07 RX ADMIN — HYDROCORTISONE SODIUM SUCCINATE SCH MG: 100 INJECTION, POWDER, FOR SOLUTION INTRAMUSCULAR; INTRAVENOUS at 14:10

## 2018-03-07 RX ADMIN — FAMOTIDINE SCH MG: 10 INJECTION, SOLUTION INTRAVENOUS at 20:38

## 2018-03-07 RX ADMIN — POTASSIUM CHLORIDE SCH MLS/HR: 200 INJECTION, SOLUTION INTRAVENOUS at 15:21

## 2018-03-07 NOTE — NUR
Pt Potassium critically low at 2.3, plan is to place with IV potassium then recheck. 
Physician and patient aware of plan of care, will continue to monitor.

## 2018-03-07 NOTE — PN
DATE:  03/07/2018



CURRENT PROBLEMS:

1.  Sepsis with urinary tract infection, gram-negative.

2.  Sepsis with left upper lobe pneumonia.

3.  Gram-positive blood culture; however, most likely contaminant.

4.  Hypomagnesemia.

5.  Severe hypokalemia.

6.  Neurogenic bladder.

7.  Abdominal pain.

8.  Fluid overload, good response to Lasix.

9.  Acute hypoxic respiratory failure, resolved.

10.  Deep vein thrombosis prophylaxis.

11.  MRSA positive screen.



SUBJECTIVE:  The patient is doing a little bit better except her potassium.  She

is really asymptomatic and not particularly complaining of any cramping.  She

received 120 mEq p.o., which increased her potassium from 2.2 to 2.3.  We will

go ahead and start IV.  Discussed with the , the gram-positive cocci

and the microbiology states that there are at least 5 different organisms,

mostly respiratory and most likely contaminant and not recommended to pursue

sensitivities.  So, some of her antibiotics will be discontinued.  Overall, we

are moving toward discharge.



PHYSICAL EXAMINATION:

VITAL SIGNS:  Blood pressure 126/86, pulse is 70, temperature is 97.5, pulse ox

97% on room air.  Her weight today is 110 pounds, up less than a pound and she

had lost about 5 pounds from the Lasix.

GENERAL:  Color is still pale.

HEENT:  Tongue is moist.

NECK:  Supple.

LUNGS:  Clear.  No wheezes or rhonchi.

CARDIOVASCULAR:  Regular rhythm and rate.

ABDOMEN:  Soft, mild epigastric tenderness.

EXTREMITIES:  Without ankle edema.



LABORATORY DATA:  As stated, potassium 2.3.



PLAN:  Continue with potassium protocol, try to adding potassium rich foods as

well.  Also, p.o. intake has been very poor since admission, noted the low

albumin also.  Mother can resume the tube feedings if she would like and will

work toward discharge tomorrow.





______________________________

LIZ OVALLES DO



DR:  SAAD/clare  JOB#:  [id number] / [id number]

DD:  03/07/2018 15:00  DT:  03/07/2018 21:48

## 2018-03-07 NOTE — NUR
Pt sitting up in bed this morning, seems to be doing much better today. Potassium critically 
low at 2.2, plan is to replace with oral potassium and then recheck labs. Pt breathing 
treatments discontinued and changed to PRN. Lungs clear to auscultate, no soa or dyspnea 
noted. Pt able to tolerate food today and plans to try lunch to increase potassium level.

## 2018-03-08 VITALS — SYSTOLIC BLOOD PRESSURE: 131 MMHG | DIASTOLIC BLOOD PRESSURE: 83 MMHG

## 2018-03-08 VITALS — DIASTOLIC BLOOD PRESSURE: 53 MMHG | SYSTOLIC BLOOD PRESSURE: 101 MMHG

## 2018-03-08 LAB
ALBUMIN SERPL-MCNC: 2.4 G/DL (ref 3.4–5)
ALBUMIN/GLOB SERPL: 0.7 {RATIO} (ref 1–1.7)
ALP SERPL-CCNC: 32 U/L (ref 46–116)
ALT SERPL-CCNC: 34 U/L (ref 14–59)
ANION GAP SERPL CALC-SCNC: 4 MMOL/L (ref 6–14)
ANION GAP SERPL CALC-SCNC: 4 MMOL/L (ref 6–14)
AST SERPL-CCNC: 16 U/L (ref 15–37)
BILIRUB SERPL-MCNC: 0.3 MG/DL (ref 0.2–1)
BUN/CREAT SERPL: 13 (ref 6–20)
CA-I SERPL ISE-MCNC: 8 MG/DL (ref 7–20)
CA-I SERPL ISE-MCNC: 9 MG/DL (ref 7–20)
CALCIUM SERPL-MCNC: 7.9 MG/DL (ref 8.5–10.1)
CALCIUM SERPL-MCNC: 8 MG/DL (ref 8.5–10.1)
CHLORIDE SERPL-SCNC: 102 MMOL/L (ref 98–107)
CHLORIDE SERPL-SCNC: 104 MMOL/L (ref 98–107)
CO2 SERPL-SCNC: 31 MMOL/L (ref 21–32)
CO2 SERPL-SCNC: 31 MMOL/L (ref 21–32)
CREAT SERPL-MCNC: 0.6 MG/DL (ref 0.6–1)
CREAT SERPL-MCNC: 0.6 MG/DL (ref 0.6–1)
GFR SERPLBLD BASED ON 1.73 SQ M-ARVRAT: 125 ML/MIN
GFR SERPLBLD BASED ON 1.73 SQ M-ARVRAT: 125 ML/MIN
GLOBULIN SER-MCNC: 3.3 G/DL (ref 2.2–3.8)
GLUCOSE SERPL-MCNC: 101 MG/DL (ref 70–99)
GLUCOSE SERPL-MCNC: 127 MG/DL (ref 70–99)
MAGNESIUM SERPL-MCNC: 1.7 MG/DL (ref 1.8–2.4)
POTASSIUM SERPL-SCNC: 2.9 MMOL/L (ref 3.5–5.1)
POTASSIUM SERPL-SCNC: 3.3 MMOL/L (ref 3.5–5.1)
PROT SERPL-MCNC: 5.7 G/DL (ref 6.4–8.2)
SODIUM SERPL-SCNC: 137 MMOL/L (ref 136–145)
SODIUM SERPL-SCNC: 139 MMOL/L (ref 136–145)

## 2018-03-08 RX ADMIN — HYDROCORTISONE SODIUM SUCCINATE SCH MG: 100 INJECTION, POWDER, FOR SOLUTION INTRAMUSCULAR; INTRAVENOUS at 05:47

## 2018-03-08 RX ADMIN — FAMOTIDINE SCH MG: 10 INJECTION, SOLUTION INTRAVENOUS at 07:43

## 2018-03-08 RX ADMIN — METOPROLOL TARTRATE SCH MG: 25 TABLET, FILM COATED ORAL at 07:42

## 2018-03-08 RX ADMIN — PANTOPRAZOLE SODIUM SCH MG: 40 TABLET, DELAYED RELEASE ORAL at 13:59

## 2018-03-08 RX ADMIN — KETOROLAC TROMETHAMINE PRN MG: 30 INJECTION, SOLUTION INTRAMUSCULAR at 06:02

## 2018-03-08 RX ADMIN — ENOXAPARIN SODIUM SCH MG: 100 INJECTION SUBCUTANEOUS at 05:47

## 2018-03-08 RX ADMIN — HYDROCORTISONE SODIUM SUCCINATE SCH MG: 100 INJECTION, POWDER, FOR SOLUTION INTRAMUSCULAR; INTRAVENOUS at 13:59

## 2018-03-08 RX ADMIN — KETOROLAC TROMETHAMINE PRN MG: 30 INJECTION, SOLUTION INTRAMUSCULAR at 14:14

## 2018-03-08 NOTE — NUR
Discharge paperwork given to mother, patient and mother understand POC. Pt to come to 
hospital tomorrow for outpatient blood work. Portacath to remain accessed due to having 
blood draw tomorrow. Carr discontinued at this time, pt does straight catheterization at 
home by mother. Pt VSS prior to discharge. Levaquin and evening medication given at this 
time, understands that they will be on Levaquin for 6 more days. Also understands that they 
will need to take Potassium TID for supplement. Pt understands POC at this time.

## 2018-03-08 NOTE — NUR
Pt discharged at this time, ambulated off unit via wheelchair to parents vehicle. Pt and 
mother understand POC, will be up to hospital tomorrow for outpatient labs. Belongings left 
with patient at this time.

## 2018-03-08 NOTE — PDOC
SUBJECTIVE:


SEEN ON 3/ 8


DOING BETTER. WANTS TO GO HOME.  POTASSIUM UP TO 2.9 THIS MORNING.  CONTINUING 

WITH REPLACEMENT





OBJECTIVE:


Problems:


Problems


Medical Problems:


(1) Pyelonephritis


Status: Acute  





(2) SIRS (systemic inflammatory response syndrome)


Status: Acute  








CURRENT PROBLEMS:


1.  Sepsis with urinary tract infection, gram-negative.


2.  Sepsis with left upper lobe pneumonia.


3.  Gram-positive blood culture; however, most likely contaminant.


4.  Hypomagnesemia.


5.  Severe hypokalemia.


6.  Neurogenic bladder.


7.  Abdominal pain.


8.  Fluid overload, good response to Lasix.


9.  Acute hypoxic respiratory failure, resolved.


10.  Deep vein thrombosis prophylaxis.


11.  MRSA positive screen.


Vital Signs:





Vital Signs








  Date Time  Temp Pulse Resp B/P (MAP) Pulse Ox O2 Delivery O2 Flow Rate FiO2


 


3/8/18 08:16      Room Air  


 


3/8/18 07:42  70  131/80    


 


3/8/18 06:10 98.4  18  98   


 


3/7/18 14:26       3.0 








I & O











Intake and Output 


 


 3/8/18





 07:00


 


Intake Total 2385 ml


 


Output Total 2325 ml


 


Balance 60 ml


 


 


 


Intake Oral 1060 ml


 


IV Total 1325 ml


 


Output Urine Total 2325 ml








Labs:





Laboratory Tests








Test


  3/7/18


05:30 3/7/18


14:30 3/7/18


20:40 3/8/18


06:05


 


White Blood Count


  5.4 x10^3/uL


(4.0-11.0) 


  


  


 


 


Red Blood Count


  3.74 x10^6/uL


(3.50-5.40) 


  


  


 


 


Hemoglobin


  11.2 g/dL


(12.0-15.5) 


  


  


 


 


Hematocrit


  32.2 %


(36.0-47.0) 


  


  


 


 


Mean Corpuscular Volume 86 fL ()    


 


Mean Corpuscular Hemoglobin 30 pg (25-35)    


 


Mean Corpuscular Hemoglobin


Concent 35 g/dL


(31-37) 


  


  


 


 


Red Cell Distribution Width


  13.2 %


(11.5-14.5) 


  


  


 


 


Platelet Count


  171 x10^3/uL


(140-400) 


  


  


 


 


Neutrophils (%) (Auto) 73 % (31-73)    


 


Lymphocytes (%) (Auto) 16 % (24-48)    


 


Monocytes (%) (Auto) 11 % (0-9)    


 


Eosinophils (%) (Auto) 0 % (0-3)    


 


Basophils (%) (Auto) 0 % (0-3)    


 


Neutrophils # (Auto)


  3.9 x10^3uL


(1.8-7.7) 


  


  


 


 


Lymphocytes # (Auto)


  0.9 x10^3/uL


(1.0-4.8) 


  


  


 


 


Monocytes # (Auto)


  0.6 x10^3/uL


(0.0-1.1) 


  


  


 


 


Eosinophils # (Auto)


  0.0 x10^3/uL


(0.0-0.7) 


  


  


 


 


Basophils # (Auto)


  0.0 x10^3/uL


(0.0-0.2) 


  


  


 


 


Sodium Level


  141 mmol/L


(136-145) 140 mmol/L


(136-145) 139 mmol/L


(136-145) 139 mmol/L


(136-145)


 


Potassium Level


  2.2 mmol/L


(3.5-5.1) 2.3 mmol/L


(3.5-5.1) 2.4 mmol/L


(3.5-5.1) 2.9 mmol/L


(3.5-5.1)


 


Chloride Level


  102 mmol/L


() 102 mmol/L


() 102 mmol/L


() 104 mmol/L


()


 


Carbon Dioxide Level


  32 mmol/L


(21-32) 32 mmol/L


(21-32) 30 mmol/L


(21-32) 31 mmol/L


(21-32)


 


Anion Gap 7 (6-14)  6 (6-14)  7 (6-14)  4 (6-14) 


 


Blood Urea Nitrogen 6 mg/dL (7-20)  7 mg/dL (7-20)  9 mg/dL (7-20)  8 mg/dL (7-

20) 


 


Creatinine


  0.7 mg/dL


(0.6-1.0) 0.6 mg/dL


(0.6-1.0) 0.9 mg/dL


(0.6-1.0) 0.6 mg/dL


(0.6-1.0)


 


Estimated GFR


(Cockcroft-Gault) 104.6 


  125.0 


  78.3 


  125.0 


 


 


BUN/Creatinine Ratio 9 (6-20)    13 (6-20) 


 


Glucose Level


  101 mg/dL


(70-99) 108 mg/dL


(70-99) 166 mg/dL


(70-99) 101 mg/dL


(70-99)


 


Calcium Level


  8.0 mg/dL


(8.5-10.1) 8.0 mg/dL


(8.5-10.1) 8.3 mg/dL


(8.5-10.1) 7.9 mg/dL


(8.5-10.1)


 


Magnesium Level


  1.8 mg/dL


(1.8-2.4) 


  


  1.7 mg/dL


(1.8-2.4)


 


Total Bilirubin


  0.3 mg/dL


(0.2-1.0) 


  


  0.3 mg/dL


(0.2-1.0)


 


Aspartate Amino Transf


(AST/SGOT) 25 U/L (15-37) 


  


  


  16 U/L (15-37) 


 


 


Alanine Aminotransferase


(ALT/SGPT) 51 U/L (14-59) 


  


  


  34 U/L (14-59) 


 


 


Alkaline Phosphatase


  36 U/L


() 


  


  32 U/L


()


 


Total Protein


  6.0 g/dL


(6.4-8.2) 


  


  5.7 g/dL


(6.4-8.2)


 


Albumin


  2.5 g/dL


(3.4-5.0) 


  


  2.4 g/dL


(3.4-5.0)


 


Albumin/Globulin Ratio 0.7 (1.0-1.7)    0.7 (1.0-1.7) 








Physical Exam:


PALE


TONGUE MOIST, THROAT CLEAR


LUNGS CTA


CV RRR


ABDOMEN FLAT AND SOFT, NON TENDER TODAY


EXTREMITIES WITHOUT EDEMA.





ASSESSMENT:


AS ABOVE





PLAN:


WILL CONTINUE TO REPLACE POTASSIUM.  IF WE CAN GET IT INTO THE NORMAL RANGE 

WILL DISCHARGE TO HOME.











LIZ OVALLES DO Mar 8, 2018 10:43

## 2018-03-08 NOTE — NUR
Potassium 2.9 this AM, Dr. Tracey notified. Orders to do IV K+ 40MEQ, will recheck once 
completed. Plan is to discharge today if potassium level starts to become within normal 
limits. Pt is hoping to go home as she is feeling much better. Lungs CTA, no soa or dyspnea 
noted. VSS. No complaints of pain or discomfort noted. Will continue to monitor.

## 2018-03-09 NOTE — PDOC3
Discharge Summary


Visit Information


Date of Admission:  Mar 2, 2018


Date of Discharge:  Mar 8, 2018


Final Diagnosis


Problems


Medical Problems:


(1) Pyelonephritis


Status: Acute  





(2) SIRS (systemic inflammatory response syndrome)


Status: Acute  





 Problems:


(1) Pyelonephritis


Status: Acute  





(2) SIRS (systemic inflammatory response syndrome)


Status: Acute  








CURRENT PROBLEMS:


1.  Sepsis with urinary tract infection, gram-negative.


2.  Sepsis with left upper lobe pneumonia.


3.  Gram-positive blood culture; however, most likely contaminant.


4.  Hypomagnesemia.


5.  Severe hypokalemia.


6.  Neurogenic bladder.


7.  Abdominal pain.


8.  Fluid overload, good response to Lasix.


9.  Acute hypoxic respiratory failure, resolved.


10.  Deep vein thrombosis prophylaxis.


11.  MRSA positive screen.


Problems:  





Brief Hospital Course


Allergies





 Allergies








Coded Allergies Type Severity Reaction Last Updated Verified


 


  erythromycin base Allergy Intermediate  5/4/16 Yes


 


  I S O L A T I O N *CONTACT* Allergy Unknown  5/5/16 Yes








Vital Signs





Vital Signs








  Date Time  Temp Pulse Resp B/P (MAP) Pulse Ox O2 Delivery O2 Flow Rate FiO2


 


3/8/18 14:41 98.6 70 16 101/53 (69) 97 Room Air  


 


3/7/18 14:26       3.0 








Lab Results





Laboratory Tests








Test


  3/7/18


14:30 3/7/18


20:40 3/8/18


06:05 3/8/18


12:47


 


Sodium Level


  140 mmol/L


(136-145) 139 mmol/L


(136-145) 139 mmol/L


(136-145) 137 mmol/L


(136-145)


 


Potassium Level


  2.3 mmol/L


(3.5-5.1) 2.4 mmol/L


(3.5-5.1) 2.9 mmol/L


(3.5-5.1) 3.3 mmol/L


(3.5-5.1)


 


Chloride Level


  102 mmol/L


() 102 mmol/L


() 104 mmol/L


() 102 mmol/L


()


 


Carbon Dioxide Level


  32 mmol/L


(21-32) 30 mmol/L


(21-32) 31 mmol/L


(21-32) 31 mmol/L


(21-32)


 


Anion Gap 6 (6-14)  7 (6-14)  4 (6-14)  4 (6-14) 


 


Blood Urea Nitrogen 7 mg/dL (7-20)  9 mg/dL (7-20)  8 mg/dL (7-20)  9 mg/dL (7-

20) 


 


Creatinine


  0.6 mg/dL


(0.6-1.0) 0.9 mg/dL


(0.6-1.0) 0.6 mg/dL


(0.6-1.0) 0.6 mg/dL


(0.6-1.0)


 


Estimated GFR


(Cockcroft-Gault) 125.0 


  78.3 


  125.0 


  125.0 


 


 


Glucose Level


  108 mg/dL


(70-99) 166 mg/dL


(70-99) 101 mg/dL


(70-99) 127 mg/dL


(70-99)


 


Calcium Level


  8.0 mg/dL


(8.5-10.1) 8.3 mg/dL


(8.5-10.1) 7.9 mg/dL


(8.5-10.1) 8.0 mg/dL


(8.5-10.1)


 


BUN/Creatinine Ratio   13 (6-20)  


 


Magnesium Level


  


  


  1.7 mg/dL


(1.8-2.4) 


 


 


Total Bilirubin


  


  


  0.3 mg/dL


(0.2-1.0) 


 


 


Aspartate Amino Transf


(AST/SGOT) 


  


  16 U/L (15-37) 


  


 


 


Alanine Aminotransferase


(ALT/SGPT) 


  


  34 U/L (14-59) 


  


 


 


Alkaline Phosphatase


  


  


  32 U/L


() 


 


 


Total Protein


  


  


  5.7 g/dL


(6.4-8.2) 


 


 


Albumin


  


  


  2.4 g/dL


(3.4-5.0) 


 


 


Albumin/Globulin Ratio   0.7 (1.0-1.7)  








Brief Hospital Course


Ms. Vazquez  is a 22 old [sex] who presented with [ ]


          ADM Status: ADM IN             ADM Date: 03/02/18  


 








ADMIT DATE:  03/02/2018





HISTORY OF PRESENT ILLNESS:  The patient is a 22-year-old  female


patient who was brought to the Emergency Room by her mother for possible sepsis.


 The patient stated that she developed left flank pain during the night last


night and awoke this morning with severe left-sided flank pain.  She has had


urinary frequency and thirst throughout the day.  She has become achy all over


and nauseous with face and lip tingling.  She stated this is how she usually


typically presents with sepsis.  The patient has neurogenic bladder and her mom


catheterize her.  She apparently has history of kidney stones and DVT among


others.  She has also history of immunodeficiency treated with Privigen and the


last time she received her injection was last Wednesday.  The patient denied any


cough, sore throat, dyspnea, nasal discharge, hematuria, or diarrhea.  The


patient was investigated in the Emergency Room and her urinalysis showed only


5-10 wbc's but too many bacteria, was sent for culture and sensitivity.  Her


chemistry showed that her lactic acid went up slightly higher at 2.1.  She has


hypomagnesemia and her white cell count initially was low at 2.6.  She has had a


CT scan of the abdomen and pelvis, which basically showed that her heart is


normal in size, no pericardial or pleural effusion, clear lung bases. 


Non-contrasted appearance of the liver, spleen, gallbladder, pancreas, and


adrenals are within normal limits.  No nephrolithiasis or hydronephrosis, no


enlarged peritoneal or pelvic adenopathy.  No bowel obstruction.  G-tube noted. 


Appendix not visualized, anteverted uterus.  Bladder swab, no radiopaque stones.


 Right gluteal soft tissue spinal cord stimulator noted with its leads in the


left presacral soft tissue.  No free intraperitoneal air.  No suspicious bony


lesions and the impression is that the patient has no nephrolithiasis or


hydronephrosis, no bowel obstruction.  Given that she has a history of DVT, she


has had venous Doppler ultrasound of both lower extremities, which showed there


is no sonographic evidence of deep vein thrombosis in either lower extremity. 


Her CT angio was done for elevated D-dimer and history of DVT showed that she


has no evidence of central lobar or segmental pulmonary emboli.  She has


prominent reticular nodular markings throughout both lungs, left greater than


right, new from prior study.  This is nonspecific, but could be related to


bronchiolitis or other atypical infection given the thickened fluid filled


thoracic esophagus.  Aspiration is another consideration.  More dominant


calcified pulmonary nodule in the left lower lobe are most likely related to the


same process and she has borderline enlarged right hilar lymph nodes,


nonspecific.  Her mother stated that she did have esophageal stricture that was


dilated about a year ago by gastroenterologist at [facility].  In any case, the patient was admitted with sepsis and was started on IV


vancomycin as well as Zosyn.  I will probably add Levaquin to cover atypical


bacteria.  We will cover her also with the stress dose of steroids as she is


known to have Lyon's disease.


bLOOD CULTURES FELT TO BE A CONTAMINANT, SSTABITHA WAS TREAATED WITH ONE DOSE OF 

LASIX FOR ACUTE FLUID OVERLOAD AND HYPOXIA TO WHICH SHE RESPONDED NICELY, 

HOWEVER SHE DEVELOPED HYPOKALEMIA WHICH TOOK A LONG TIME TO CORRECT. 


SHE WAS IN IMPROVED ENOUGH CONDITION TO BE DISCHARGED ON 3/8 IN THE CARE OF HER 

MOTHER.





Discharge Information


Condition at Discharge:  Improved, Stable


Disposition/Orders:  D/C to Home


Dischare Medications





Current Medications


Ceftriaxone Sodium 1 gm/ Sodium Chloride 50 ml @  100 mls/hr 1X  ONCE IV ;  

Start 3/1/18 at 21:15;  Stop 3/1/18 at 21:44;  Status UNV


Morphine Sulfate (Morphine 4mg Syringe) 4 mg PRN Q15MIN  PRN IV/SQ PAIN GREATER 

THAN 3/10 Last administered on 3/1/18at 21:47;  Start 3/1/18 at 21:15;  Stop 3/2

/18 at 21:14;  Status DC


Sodium Chloride 500 ml @  1,000 mls/hr Q30M IV  Last administered on 3/1/18at 21

:11;  Start 3/1/18 at 21:11;  Stop 3/1/18 at 21:40;  Status DC


Ondansetron HCl (Zofran) 4 mg 1X  ONCE IV  Last administered on 3/1/18at 21:50;

  Start 3/1/18 at 21:30;  Stop 3/1/18 at 21:31;  Status DC


Ketorolac Tromethamine (Toradol) 30 mg 1X  ONCE IV  Last administered on 3/1/

18at 21:45;  Start 3/1/18 at 21:30;  Stop 3/1/18 at 21:31;  Status DC


Ceftriaxone Sodium (Rocephin) 1 gm 1X  ONCE IVP  Last administered on 3/1/18at 

21:30;  Start 3/1/18 at 21:30;  Stop 3/1/18 at 21:31;  Status DC


Potassium Chloride (Klor-Con) 40 meq 1X  ONCE PO  Last administered on 3/1/18at 

23:01;  Start 3/1/18 at 23:00;  Stop 3/1/18 at 23:01;  Status DC


Phenazopyridine HCl (Pyridium) 100 mg 1X  ONCE PO  Last administered on 3/1/

18at 23:01;  Start 3/1/18 at 23:15;  Stop 3/1/18 at 23:16;  Status DC


Sodium Chloride 1,000 ml @  1,000 mls/hr Q1H IV  Last administered on 3/1/18at 

23:36;  Start 3/1/18 at 23:30;  Stop 3/2/18 at 00:29;  Status DC


Acetaminophen/ Hydrocodone Bitart (Lortab 5/325) 1 tab 1X  ONCE PO  Last 

administered on 3/2/18at 00:43;  Start 3/2/18 at 01:00;  Stop 3/2/18 at 01:01;  

Status DC


Ondansetron HCl (Zofran) 4 mg 1X  ONCE IV ;  Start 3/2/18 at 01:00;  Stop 3/2/

18 at 01:01;  Status DC


Iohexol (Omnipaque 300 Mg/ml) 75 ml 1X  ONCE IV  Last administered on 3/2/18at 

02:01;  Start 3/2/18 at 01:45;  Stop 3/2/18 at 01:46;  Status DC


Info (Do NOT chart on this entry -- for MONITORING) 1 each PRN DAILY  PRN MC 

SEE COMMENTS;  Start 3/2/18 at 01:30;  Stop 3/4/18 at 01:30;  Status DC


Ondansetron HCl (Zofran) 4 mg PRN Q4HRS  PRN IV NAUSEA/VOMITING Last 

administered on 3/2/18at 21:07;  Start 3/2/18 at 03:30;  Stop 3/3/18 at 03:29;  

Status DC


Morphine Sulfate (Morphine 2mg Syringe) 2 mg PRN Q2HR  PRN IV SEVERE PAIN Last 

administered on 3/2/18at 04:35;  Start 3/2/18 at 03:30;  Stop 3/3/18 at 03:29;  

Status DC


Potassium Chloride 20 meq/ Sodium Chloride 1,010 ml @  200 mls/hr Q5H3M IV ;  

Start 3/2/18 at 03:30;  Stop 3/3/18 at 03:29;  Status UNV


Acetaminophen (Tylenol) 650 mg PRN Q4HRS  PRN PO FEVER;  Start 3/2/18 at 03:30;

  Stop 3/3/18 at 03:29;  Status DC


Vancomycin HCl (Vanco Per Pharmacy) 1 each PRN DAILY  PRN MC SEE COMMENTS Last 

administered on 3/4/18at 15:53;  Start 3/2/18 at 03:30;  Stop 3/7/18 at 10:46;  

Status DC


Piperacillin Sod/ Tazobactam Sod (Zosyn Per Pharmacy) 1 each PRN DAILY  PRN MC 

SEE COMMENTS Last administered on 3/4/18at 15:32;  Start 3/2/18 at 03:30;  Stop 

3/7/18 at 10:46;  Status DC


Famotidine (Pepcid Vial) 20 mg BID IVP  Last administered on 3/7/18at 20:38;  

Start 3/2/18 at 09:00;  Stop 3/8/18 at 07:45;  Status DC


Potassium Chloride/Sodium Chloride 1,000 ml @  125 mls/hr Q8H IV  Last 

administered on 3/3/18at 04:11;  Start 3/2/18 at 03:45;  Stop 3/3/18 at 03:44;  

Status DC


Vancomycin HCl 1 gm/Sodium Chloride 250 ml @  250 mls/hr 1X  ONCE IV  Last 

administered on 3/2/18at 06:15;  Start 3/2/18 at 04:00;  Stop 3/2/18 at 04:59;  

Status DC


Piperacillin Sod/ Tazobactam Sod 3.375 gm/Sodium Chloride 50 ml @  100 mls/hr 

1X  ONCE IV  Last administered on 3/2/18at 04:11;  Start 3/2/18 at 04:00;  Stop 

3/2/18 at 04:30;  Status DC


Potassium Chloride (Klor-Con) 20 meq Q8H PO ;  Start 3/2/18 at 08:00;  Stop 3/2/

18 at 17:03;  Status DC


Enoxaparin Sodium (Lovenox 80mg Syringe) 40 mg DAILY06 SQ  Last administered on 

3/2/18at 05:55;  Start 3/2/18 at 06:00;  Stop 3/2/18 at 07:23;  Status DC


Piperacillin Sod/ Tazobactam Sod 3.375 gm/Sodium Chloride 50 ml @  100 mls/hr 

Q6HRS IV  Last administered on 3/7/18at 05:17;  Start 3/2/18 at 12:00;  Stop 3/7

/18 at 10:46;  Status DC


Lactobacillus Rhamnosus (Culturelle) 1 cap BID PO ;  Start 3/2/18 at 09:00;  

Stop 3/3/18 at 08:53;  Status DC


Diphenhydramine HCl (Benadryl) 50 mg STK-MED ONCE .ROUTE ;  Start 3/2/18 at 05:

43;  Stop 3/2/18 at 05:44;  Status DC


Diphenhydramine HCl (Benadryl) 25 mg PRN Q6HRS  PRN IVP ITCHING Last 

administered on 3/8/18at 00:55;  Start 3/2/18 at 06:00;  Stop 3/8/18 at 15:24;  

Status DC


Vancomycin HCl 750 mg/Sodium Chloride 250 ml @  250 mls/hr Q8HRS IV  Last 

administered on 3/3/18at 06:42;  Start 3/2/18 at 14:00;  Stop 3/3/18 at 08:11;  

Status DC


Vancomycin HCl (Vancomycin Trough Level) 1 each 1X  ONCE MC  Last administered 

on 3/3/18at 05:30;  Start 3/3/18 at 05:30;  Stop 3/3/18 at 05:31;  Status DC


Enoxaparin Sodium (Lovenox) 40 mg DAILY06 SQ  Last administered on 3/8/18at 05:

47;  Start 3/3/18 at 06:00;  Stop 3/8/18 at 15:24;  Status DC


Hydromorphone HCl (Dilaudid) 1 mg PRN Q3HRS  PRN IV PAIN Last administered on 3/

4/18at 05:59;  Start 3/2/18 at 10:00;  Stop 3/4/18 at 08:39;  Status DC


Promethazine HCl (Phenergan) 25 mg PRN Q4HRS  PRN PO NAUSEA/VOMITING Last 

administered on 3/7/18at 20:37;  Start 3/2/18 at 14:00;  Stop 3/8/18 at 15:24;  

Status DC


Hydrocortisone Sodium Succinate (Solu-CORTEF) 100 mg Q8HRS IV  Last 

administered on 3/8/18at 13:59;  Start 3/2/18 at 22:00;  Stop 3/8/18 at 15:24;  

Status DC


Levofloxacin/ Dextrose 100 ml @  100 mls/hr Q24H IV  Last administered on 3/7/

18at 14:10;  Start 3/2/18 at 15:00;  Stop 3/8/18 at 08:52;  Status DC


Albuterol Sulfate (Ventolin Hfa) 2 puff PRN Q4HRS  PRN IH SHORTNESS OF BREATH;  

Start 3/2/18 at 14:30;  Stop 3/2/18 at 14:43;  Status DC


Metoclopramide HCl (Reglan) 10 mg PRN TID  PRN PO NAUSEA/VOMITING;  Start 3/2/

18 at 14:30;  Stop 3/8/18 at 15:24;  Status DC


Metoprolol Tartrate (Lopressor) 25 mg BID PO  Last administered on 3/8/18at 07:

42;  Start 3/2/18 at 21:00;  Stop 3/8/18 at 15:24;  Status DC


Non-Formulary Medication 2 puff BID IH ;  Start 3/2/18 at 21:00;  Stop 3/2/18 

at 21:00;  Status DC


Pantoprazole Sodium (Protonix) 40 mg NOON PO  Last administered on 3/8/18at 13:

59;  Start 3/3/18 at 12:00;  Stop 3/8/18 at 15:24;  Status DC


Non-Formulary Medication 290 mcg PRN DAILY  PRN PO CONSTIPATION;  Start 3/2/18 

at 14:30;  Stop 3/8/18 at 15:24;  Status UNV


Potassium Chloride (Klor-Con) 20 meq TID PO ;  Start 3/3/18 at 09:00;  Stop 3/3/

18 at 09:00;  Status DC


Albuterol Sulfate (Ventolin) 2.5 mg PRN Q4HRS  PRN NEB SHORTNESS OF BREATH;  

Start 3/2/18 at 14:45;  Stop 3/8/18 at 15:24;  Status DC


Albuterol Sulfate (Ventolin) 2.5 mg RTQID NEB  Last administered on 3/7/18at 09:

23;  Start 3/2/18 at 16:00;  Stop 3/7/18 at 10:11;  Status DC


Budesonide (Pulmicort) 0.5 mg RTBID NEB  Last administered on 3/7/18at 09:23;  

Start 3/2/18 at 20:00;  Stop 3/7/18 at 10:11;  Status DC


Promethazine HCl 12.5 mg/Sodium Chloride 50.5 ml @  101 mls/hr PRN Q6HRS  PRN 

IV NAUSEA/VOMITING Last administered on 3/6/18at 19:35;  Start 3/2/18 at 17:30;

  Stop 3/7/18 at 10:11;  Status DC


Acetaminophen (Tylenol) 650 mg PRN Q6HRS  PRN WI PAIN / TEMP Last administered 

on 3/2/18at 21:54;  Start 3/2/18 at 21:45;  Stop 3/8/18 at 15:24;  Status DC


Potassium Chloride/Sodium Chloride 1,000 ml @  125 mls/hr Q8H IV  Last 

administered on 3/3/18at 14:26;  Start 3/3/18 at 06:00;  Stop 3/4/18 at 09:46;  

Status DC


Vancomycin HCl 1 gm/Sodium Chloride 250 ml @  250 mls/hr Q8HRS IV  Last 

administered on 3/7/18at 05:54;  Start 3/3/18 at 14:00;  Stop 3/7/18 at 10:46;  

Status DC


Vancomycin HCl (Vancomycin Trough Level) 1 each 1X  ONCE MC  Last administered 

on 3/4/18at 14:00;  Start 3/4/18 at 13:30;  Stop 3/4/18 at 13:31;  Status DC


Magnesium Sulfate 50 ml @ 25 mls/hr 1X  ONCE IV  Last administered on 3/3/18at 

15:28;  Start 3/3/18 at 15:00;  Stop 3/3/18 at 16:59;  Status DC


Promethazine HCl (Phenergan) 25 mg STK-MED ONCE IV ;  Start 3/3/18 at 22:05;  

Stop 3/3/18 at 22:06;  Status DC


Hydromorphone HCl (Dilaudid) 1 mg PRN Q3HRS  PRN IV PAIN Last administered on 3/

5/18at 05:52;  Start 3/4/18 at 08:45;  Stop 3/5/18 at 11:23;  Status DC


Potassium Chloride/Sodium Chloride 1,000 ml @  125 mls/hr Q8H IV  Last 

administered on 3/5/18at 09:24;  Start 3/4/18 at 10:00;  Stop 3/5/18 at 11:23;  

Status DC


Potassium Chloride 100 ml @  50 mls/hr Q1H IV  Last administered on 3/4/18at 12:

09;  Start 3/4/18 at 10:00;  Stop 3/4/18 at 11:59;  Status DC


Vancomycin HCl (Vancomycin Trough Level) 1 each 1X  ONCE MC  Last administered 

on 3/5/18at 21:30;  Start 3/5/18 at 21:30;  Stop 3/5/18 at 21:31;  Status DC


Promethazine HCl (Phenergan) 25 mg STK-MED ONCE IV ;  Start 3/4/18 at 23:43;  

Stop 3/4/18 at 23:44;  Status DC


Albuterol Sulfate (Ventolin) 10 mg 1X  ONCE CONT NEB  Last administered on 3/5/

18at 07:17;  Start 3/5/18 at 07:00;  Stop 3/5/18 at 07:14;  Status DC


Furosemide (Lasix) 20 mg 1X  ONCE IVP  Last administered on 3/5/18at 07:23;  

Start 3/5/18 at 07:00;  Stop 3/5/18 at 07:14;  Status DC


Hydromorphone HCl (Dilaudid) 0.5 mg PRN Q3HRS  PRN IV PAIN Last administered on 

3/7/18at 14:26;  Start 3/5/18 at 11:30;  Stop 3/7/18 at 18:37;  Status DC


Promethazine HCl (Phenergan) 25 mg STK-MED ONCE IV ;  Start 3/6/18 at 01:28;  

Stop 3/6/18 at 01:29;  Status DC


Potassium Chloride 100 ml @  50 mls/hr Q2H IV  Last administered on 3/6/18at 10:

15;  Start 3/6/18 at 07:30;  Stop 3/6/18 at 11:30;  Status DC


Ketorolac Tromethamine (Toradol) 30 mg PRN Q6HRS  PRN IV PAIN Last administered 

on 3/8/18at 14:14;  Start 3/7/18 at 07:15;  Stop 3/8/18 at 15:24;  Status DC


Potassium Chloride (KCl Oral Soln) 40 meq Q2H PEG  Last administered on 3/7/

18at 07:56;  Start 3/7/18 at 07:30;  Stop 3/7/18 at 08:43;  Status DC


Potassium Chloride (Klor-Con) 20 meq 1X  ONCE PO  Last administered on 3/7/18at 

08:53;  Start 3/7/18 at 08:30;  Stop 3/7/18 at 08:31;  Status DC


Potassium Chloride (Klor-Con) 80 meq 1X  ONCE PO  Last administered on 3/7/18at 

09:52;  Start 3/7/18 at 09:00;  Stop 3/7/18 at 09:01;  Status DC


Potassium Chloride 100 ml @  50 mls/hr Q2H IV  Last administered on 3/7/18at 17:

07;  Start 3/7/18 at 15:30;  Stop 3/7/18 at 19:30;  Status DC


Hydromorphone HCl (Dilaudid) 0.5 mg PRN Q4HRS  PRN IV PAIN Last administered on 

3/7/18at 22:59;  Start 3/7/18 at 18:45;  Stop 3/8/18 at 15:24;  Status DC


Potassium Chloride 40 meq/ Dextrose 520 ml @  130 mls/hr 1X  ONCE IV  Last 

administered on 3/7/18at 21:46;  Start 3/7/18 at 22:00;  Stop 3/8/18 at 01:59;  

Status DC


Potassium Chloride (Klor-Con) 40 meq 1X  ONCE PO  Last administered on 3/7/18at 

21:46;  Start 3/7/18 at 21:15;  Stop 3/7/18 at 21:37;  Status DC


Potassium Chloride (Klor-Con) 40 meq 1X  ONCE PO  Last administered on 3/8/18at 

00:10;  Start 3/7/18 at 23:59;  Stop 3/8/18 at 00:00;  Status DC


Magnesium Sulfate 50 ml @ 25 mls/hr 1X  ONCE IV  Last administered on 3/8/18at 

07:43;  Start 3/8/18 at 07:00;  Stop 3/8/18 at 08:59;  Status DC


Potassium Chloride/Dextrose 500 ml @  130 mls/hr 1X  ONCE IV  Last administered 

on 3/8/18at 07:43;  Start 3/8/18 at 07:00;  Stop 3/8/18 at 10:50;  Status DC


Levofloxacin (Levaquin) 500 mg Q24H PO  Last administered on 3/8/18at 13:58;  

Start 3/8/18 at 15:00;  Stop 3/8/18 at 15:24;  Status DC


Potassium Chloride (Klor-Con) 40 meq 1X  ONCE PO  Last administered on 3/8/18at 

13:59;  Start 3/8/18 at 13:30;  Stop 3/8/18 at 13:31;  Status DC





Active Scripts


Active


Levaquin (Levofloxacin) 500 Mg Tablet 1 Tab PO DAILY


Potassium Chloride 20 Meq Tablet.er 20 Meq PO TID 30 Days


Reported


Tramadol Hcl (Tramadol HCl) 50 Mg Tablet 50 Mg PO PRN Q6HRS PRN


Ventolin Hfa Inhaler (Albuterol Sulfate) 18 Gm Hfa.aer.ad 2 Puff IH PRN Q4HRS 

PRN


     USE AS NEEDED


Symbicort 80-4.5 Mcg Inhaler (Budesonide/Formoterol Fumarate) 10.2 Gm 

Hfa.aer.ad 2 Puff IH BID


     RESUME TONIGHT


Norco 5-325 Tablet (Hydrocodone Bit/Acetaminophen) 1 Each Tablet 1-2 Tab PO Q4-

6HRS


     LAST DOSE THIS MORNING


     NEXT DOSE AS NEEDED


Reglan (Metoclopramide Hcl) 10 Mg Tablet 10 Mg PO PRN TID PRN


     USE AS NEEDED


Promethazine Hcl 25 Mg Tablet 25 Mg PO PRN Q4HRS PRN


     LAST DOSE THIS MORNING


     USE AS NEEDED


Zofran Odt (Ondansetron) 8 Mg Tab.rapdis 8 Mg PO PRN Q4HRS PRN


     USE AS NEEDED


Metoprolol Tartrate 25 Mg Tablet 25 Mg PO BID


     LAST DOSE THIS MORNING


     NEXT DOSE TONIGHT


Nexium Capsule (Esomeprazole Magnesium) 40 Mg Capsule.dr 40 Mg PO NOON


     LAST DOSE LUNCHTIME


     NEXT DOSE TOMORROW LUNCHTIME


Linzess (Linaclotide) 290 Mcg Capsule 290 Mcg PO PRN DAILY PRN


     USE AS NEEDED





Patient Instructions


Patient Instuctions


DISCHARGE TO HAVE BLOOD WORK AND K CHECKED TOMORROW.











LIZ OVALLES DO Mar 9, 2018 12:39

## 2018-07-14 ENCOUNTER — HOSPITAL ENCOUNTER (EMERGENCY)
Dept: HOSPITAL 63 - ER | Age: 23
Discharge: HOME | End: 2018-07-14
Payer: OTHER GOVERNMENT

## 2018-07-14 VITALS — BODY MASS INDEX: 15.85 KG/M2 | HEIGHT: 67 IN | WEIGHT: 101 LBS

## 2018-07-14 VITALS — SYSTOLIC BLOOD PRESSURE: 120 MMHG | DIASTOLIC BLOOD PRESSURE: 65 MMHG

## 2018-07-14 DIAGNOSIS — Z87.820: ICD-10-CM

## 2018-07-14 DIAGNOSIS — Z88.1: ICD-10-CM

## 2018-07-14 DIAGNOSIS — J18.9: ICD-10-CM

## 2018-07-14 DIAGNOSIS — J45.909: Primary | ICD-10-CM

## 2018-07-14 DIAGNOSIS — Z91.041: ICD-10-CM

## 2018-07-14 PROCEDURE — 99284 EMERGENCY DEPT VISIT MOD MDM: CPT

## 2018-07-14 PROCEDURE — 71046 X-RAY EXAM CHEST 2 VIEWS: CPT

## 2018-07-14 PROCEDURE — 94640 AIRWAY INHALATION TREATMENT: CPT

## 2018-07-14 PROCEDURE — 96372 THER/PROPH/DIAG INJ SC/IM: CPT

## 2018-07-14 NOTE — RAD
CHEST PA   LATERAL

 

Technique:  PA and lateral views of the chest were obtained.

 

Clinical History: cough, weakness, hx of asthma, shielded

 

Comparison: None.

 

Findings:

 The heart and pulmonary vasculature appear within normal limits. There is

vague linear opacities in the lower lungs. The pleural margins are clear.

There is a right-sided Port-A-Cath.

 

Impression:

 

Vague basal infiltrates could be early pneumonia.

 

Electronically signed by: Zeke Martell III, MD (7/14/2018 2:07 PM) [id number]

## 2018-07-14 NOTE — PHYS DOC
Past History


Past Medical History:  Other


Additional Past Medical Histor:  history of TBI, history of autoimmune disorder.


Past Surgical History:  No Surgical History


Smoking:  Non-smoker


Alcohol Use:  None


Drug Use:  None





Adult General


Chief Complaint


Chief Complaint:  My lungs hurts





[facility]





22-year-old female patient with history of TBI and asthma states she had one 

episode of shortness of breath and pain in her lungs one week ago with 

temperature of 99.9 that resolved with Tylenol. Patient had a temperature of 

99.1 last night and this morning complaining of hurting in posterior chest  

bilaterally with feeling of shortness of breath. Patient rated her pain 8/10 at 

home and denies any pain at this time or shortness of breath. She denies sore 

throat, earache, nasal congestion, sick contact. Most of the history was taking 

from patient mother who is her guardian.





Review of Systems


Review of Systems





Constitutional: Low-grade fever]


Eyes: Denies change in visual acuity, redness, or eye pain []


HENT: Denies nasal congestion or sore throat []


Respiratory: Denies cough, reports shortness of breath []


Cardiovascular: No additional information not addressed in HPI []


GI: Denies abdominal pain, nausea, vomiting, bloody stools or diarrhea []


: Denies dysuria or hematuria []


Musculoskeletal: Denies back pain or joint pain []


Integument: Denies rash or skin lesions []


Neurologic: Denies headache, focal weakness or sensory changes []


Endocrine: Denies polyuria or polydipsia []





All other systems were reviewed and found to be within normal limits, except as 

documented in this note.





Allergies


Allergies





Allergies








Coded Allergies Type Severity Reaction Last Updated Verified


 


  erythromycin base Allergy Intermediate  16 Yes


 


  I S O L A T I O N *CONTACT* Allergy Unknown  16 Yes











Physical Exam


Physical Exam





Constitutional: Well nourished, mild distress, non-toxic appearance. []


HENT: Normocephalic, atraumatic, bilateral external ears normal, oropharynx 

moist, no oral exudates, nose normal. []


Eyes: PERRLA, EOMI, conjunctiva normal, no discharge. [] 


Neck: Normal range of motion, no tenderness, supple, no stridor. [] 


Cardiovascular:Heart rate regular rhythm, no murmur []


Lungs & Thorax:  Bilateral breath sounds clear to auscultation []


Abdomen: Bowel sounds normal, soft, no tenderness, no masses, no pulsatile 

masses. [] 


Skin: Warm, dry, no erythema, no rash. [] 


Back: No tenderness, no CVA tenderness. [] 


Extremities: No tenderness, no cyanosis, no clubbing, paraplegic.


Neurologic: Alert and oriented X 3


Psychologic: Affect normal, judgement normal, mood normal. []





EKG


EKG


[]





Radiology/Procedures


Radiology/Procedures


 [facility] [address], [place] [address] [phone]


 


 IMAGING REPORT





 Signed





PATIENT: DENNISE PATE ACCOUNT: [id number] MRN#: [id number]


: 1995 LOCATION: ER AGE: 22


SEX: F EXAM DT: 18 ACCESSION#: [id number]


STATUS: REG ER ORD. PHYSICIAN: NUNO COLLINS MD 


REASON: shortness of breath


PROCEDURE: CHEST PA & LATERAL





CHEST PA   LATERAL


 


Technique:  PA and lateral views of the chest were obtained.


 


Clinical History: cough, weakness, hx of asthma, shielded


 


Comparison: None.


 


Findings:


 The heart and pulmonary vasculature appear within normal limits. There is


vague linear opacities in the lower lungs. The pleural margins are clear.


There is a right-sided Port-A-Cath.


 


Impression:


 


Vague basal infiltrates could be early pneumonia.


 


Electronically signed by: Alyce Chen III, MD (2018 2:07 PM) [facility]














DICTATED AND SIGNED BY:     ALYCE CHEN III, MD


DATE:     18 4571





CC: NUNO COLLINS MD; LIZ HICKMAN MD ~





Course & Med Decision Making


Course & Med Decision Making


Pertinent  Imaging studies reviewed. (See chart for details)


Evaluation of patient in ER showed 22-year-old male patient with paraplegia and 

asthma brought in because of pain in bilateral lung since last night. She had 

stable vital signs without fever or tachycardia or hypoxia. X-ray was 

questionable for possible pneumonia. Plan discharge patient home with diagnosis 

of asthma exacerbation and any pneumonia and prescription of Augmentin and 

Medrol Dosepak and instruction to follow up with her primary care physician.


[]





Dragon Disclaimer


Dragon Disclaimer


This electronic medical record was generated, in whole or in part, using a 

voice recognition dictation system.





Departure


Departure:


Impression:  


 Primary Impression:  


 Asthma exacerbation


 Additional Impressions:  


 Pneumonia


 History of traumatic brain injury


Disposition:   HOME, SELF-CARE (at 1420)


Condition:  IMPROVED


Referrals:  


LIZ HICKMAN MD (PCP)


Patient Instructions:  Asthma, Adult, Pneumonia, Adult





Additional Instructions:  


Drink plenty of liquids


Follow-up with your primary care physician in 3-5 days


Return to emergency room if not getting better in 24 hours


Scripts


Amoxicillin/Potassium Clav (AUGMENTIN ES-600 SUSPENSION) 600 Mg/5 Ml Susp.recon


6 ML PO BID, #120 ML


   Prov: NUNO COLLINS MD         18 


Methylprednisolone (MEDROL) 4 Mg Tab.ds.pk


1 PKG PO UD, #1 PKG


   Prov: NUNO COLLINS MD         18





Problem Qualifiers











NUNO COLLINS MD 2018 13:25

## 2018-08-17 ENCOUNTER — HOSPITAL ENCOUNTER (OUTPATIENT)
Dept: HOSPITAL 61 - SURG | Age: 23
Discharge: HOME | End: 2018-08-17
Attending: SURGERY
Payer: OTHER GOVERNMENT

## 2018-08-17 VITALS — BODY MASS INDEX: 16.01 KG/M2 | WEIGHT: 102 LBS | HEIGHT: 67 IN

## 2018-08-17 VITALS — DIASTOLIC BLOOD PRESSURE: 63 MMHG | SYSTOLIC BLOOD PRESSURE: 112 MMHG

## 2018-08-17 DIAGNOSIS — K21.9: ICD-10-CM
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
## 2018-08-17 DIAGNOSIS — Z98.890: ICD-10-CM

## 2018-08-17 DIAGNOSIS — T80.212A: Primary | ICD-10-CM

## 2018-08-17 DIAGNOSIS — Z79.01: ICD-10-CM

## 2018-08-17 DIAGNOSIS — Z79.899: ICD-10-CM

## 2018-08-17 DIAGNOSIS — Z88.1: ICD-10-CM

## 2018-08-17 DIAGNOSIS — J45.909: ICD-10-CM

## 2018-08-17 LAB — U PREG PATIENT: NEGATIVE

## 2018-08-17 PROCEDURE — 36590 REMOVAL TUNNELED CV CATH: CPT

## 2018-08-17 PROCEDURE — S0028 INJECTION, FAMOTIDINE, 20 MG: HCPCS

## 2018-08-17 PROCEDURE — 81025 URINE PREGNANCY TEST: CPT

## 2018-08-17 PROCEDURE — 87075 CULTR BACTERIA EXCEPT BLOOD: CPT

## 2018-08-17 PROCEDURE — 87071 CULTURE AEROBIC QUANT OTHER: CPT

## 2018-08-17 NOTE — PDOC
BRIEF OPERATIVE NOTE


Date:  Aug 17, 2018


Pre-Op Diagnosis


power port extrusion


Post-Op Diagnosis


same


Procedure Performed


removal


Surgeon


Lasha


Anesthesia Type:  MAC


Blood Loss


minimal


IV Fluid


300cc


Specimens Obtained


cultures


Findings


no obvious infection


Complications


none


Operative Note


WK # [id number]











DANISH EDWARDS MD Aug 17, 2018 13:20

## 2018-08-17 NOTE — OP
DATE OF SURGERY:  08/17/2018



PREOPERATIVE DIAGNOSIS:  Right upper chest PowerPort extrusion.



POSTOPERATIVE DIAGNOSIS:  Right upper chest PowerPort extrusion.



PROCEDURE:  Removal.



SURGEON:  Mynor Edwards MD



ANESTHESIA:  Local with sedation.



ESTIMATED BLOOD LOSS:  Minimal.



INTRAVENOUS FLUID:  300.



INDICATIONS:  The patient is a 22-year-old with a history of TBI and immune

deficiency disorder.  She has a port on her upper chest that has eroded through

the skin slightly, and she is brought for removal.



DESCRIPTION OF PROCEDURE:  The patient was taken to the OR suite and the right

upper chest prepped and draped in usual sterile fashion.  With IV sedation on

board, the area was infiltrated with 0.5% Marcaine with epinephrine, and the

ulcerative skin was excised in an elliptical fashion.  The port and catheter

were removed intact.  The fibrinous sac that had developed around the reservoir

was excised.  Good hemostasis was present.  The wound was cultured.  It was then

closed loosely with 4-0 nylon stitches and a small Telfa wick placed for

subcutaneous drain.  Sterile dressing applied.  The patient taken to postop area

in stable condition having tolerated the procedure well. 



Preoperatively, she received 50 mg of Benadryl IV and 1 gram of vancomycin IV.

 



______________________________

MYNOR EDWARDS MD



DR:  BG/clare  JOB#:  [id number] / [id number]

DD:  08/17/2018 13:19  DT:  08/17/2018 14:52



ALTAGRACIA Mann MD

## 2018-08-17 NOTE — DISCH
DISCHARGE INSTRUCTIONS


Condition on Discharge


Condition on Discharge:  Stable





Activity After Discharge


Activity Instructions for Disc:  Activity as tolerated, Avoid exertion


Lifting Instructions after Dis:  No heavy lifting, No pulling or pushing





Diet after Discharge


Diet after Discharge:  Regular





Wound Incision Care


Wound/Incision Care:  Ice to area for comfort





Follow-Up


Follow up with:  Lasha Monday











DANISH EDWARDS MD Aug 17, 2018 13:22

## 2018-08-18 ENCOUNTER — HOSPITAL ENCOUNTER (EMERGENCY)
Dept: HOSPITAL 63 - ER | Age: 23
LOS: 1 days | Discharge: HOME | End: 2018-08-19
Payer: OTHER GOVERNMENT

## 2018-08-18 VITALS — WEIGHT: 115.08 LBS | BODY MASS INDEX: 18.06 KG/M2 | HEIGHT: 67 IN

## 2018-08-18 DIAGNOSIS — R07.89: ICD-10-CM

## 2018-08-18 DIAGNOSIS — J45.909: ICD-10-CM

## 2018-08-18 DIAGNOSIS — R91.8: ICD-10-CM

## 2018-08-18 DIAGNOSIS — Z91.041: ICD-10-CM

## 2018-08-18 DIAGNOSIS — Z88.1: ICD-10-CM

## 2018-08-18 DIAGNOSIS — R50.9: Primary | ICD-10-CM

## 2018-08-18 DIAGNOSIS — K21.9: ICD-10-CM

## 2018-08-18 LAB
ANION GAP SERPL CALC-SCNC: 11 MMOL/L (ref 6–14)
APTT PPP: YELLOW S
BACTERIA #/AREA URNS HPF: (no result) /HPF
BASOPHILS # BLD AUTO: 0.1 X10^3/UL (ref 0–0.2)
BASOPHILS NFR BLD: 1 % (ref 0–3)
BILIRUB UR QL STRIP: (no result)
CA-I SERPL ISE-MCNC: 11 MG/DL (ref 7–20)
CALCIUM SERPL-MCNC: 9.6 MG/DL (ref 8.5–10.1)
CHLORIDE SERPL-SCNC: 103 MMOL/L (ref 98–107)
CO2 SERPL-SCNC: 23 MMOL/L (ref 21–32)
CREAT SERPL-MCNC: 0.7 MG/DL (ref 0.6–1)
EOSINOPHIL NFR BLD: 0 % (ref 0–3)
EOSINOPHIL NFR BLD: 0 X10^3/UL (ref 0–0.7)
ERYTHROCYTE [DISTWIDTH] IN BLOOD BY AUTOMATED COUNT: 14.2 % (ref 11.5–14.5)
FIBRINOGEN PPP-MCNC: CLEAR MG/DL
GFR SERPLBLD BASED ON 1.73 SQ M-ARVRAT: 104.6 ML/MIN
GLUCOSE SERPL-MCNC: 91 MG/DL (ref 70–99)
GLUCOSE UR STRIP-MCNC: (no result) MG/DL
HCT VFR BLD CALC: 41.8 % (ref 36–47)
HGB BLD-MCNC: 14.4 G/DL (ref 12–15.5)
LYMPHOCYTES # BLD: 1.6 X10^3/UL (ref 1–4.8)
LYMPHOCYTES NFR BLD AUTO: 18 % (ref 24–48)
MCH RBC QN AUTO: 29 PG (ref 25–35)
MCHC RBC AUTO-ENTMCNC: 34 G/DL (ref 31–37)
MCV RBC AUTO: 86 FL (ref 79–100)
MONO #: 0.5 X10^3/UL (ref 0–1.1)
MONOCYTES NFR BLD: 5 % (ref 0–9)
NEUT #: 7 X10^3UL (ref 1.8–7.7)
NEUTROPHILS NFR BLD AUTO: 76 % (ref 31–73)
NITRITE UR QL STRIP: (no result)
PLATELET # BLD AUTO: 333 X10^3/UL (ref 140–400)
POTASSIUM SERPL-SCNC: 3.9 MMOL/L (ref 3.5–5.1)
RBC # BLD AUTO: 4.89 X10^6/UL (ref 3.5–5.4)
RBC #/AREA URNS HPF: (no result) /HPF (ref 0–2)
SODIUM SERPL-SCNC: 137 MMOL/L (ref 136–145)
SP GR UR STRIP: 1.01
SQUAMOUS #/AREA URNS LPF: (no result) /LPF
UROBILINOGEN UR-MCNC: 0.2 MG/DL
WBC # BLD AUTO: 9.1 X10^3/UL (ref 4–11)
WBC #/AREA URNS HPF: (no result) /HPF (ref 0–4)

## 2018-08-18 PROCEDURE — 81001 URINALYSIS AUTO W/SCOPE: CPT

## 2018-08-18 PROCEDURE — 71045 X-RAY EXAM CHEST 1 VIEW: CPT

## 2018-08-18 PROCEDURE — 36415 COLL VENOUS BLD VENIPUNCTURE: CPT

## 2018-08-18 PROCEDURE — 85025 COMPLETE CBC W/AUTO DIFF WBC: CPT

## 2018-08-18 PROCEDURE — 99285 EMERGENCY DEPT VISIT HI MDM: CPT

## 2018-08-18 PROCEDURE — 80048 BASIC METABOLIC PNL TOTAL CA: CPT

## 2018-08-18 NOTE — PHYS DOC
Past History


Past Medical History:  Asthma, GERD, Other


Additional Past Medical Histor:  history of TBI, history of autoimmune disorder.


Past Surgical History:  No Surgical History


Smoking:  Non-smoker


Alcohol Use:  None


Drug Use:  None





Adult General


Chief Complaint


Chief Complaint:  FEVER





HPI


HPI


22-year-old female presents with fever of 100.5. The patient had a port removed 

from her chest yesterday and wound has had drainage. The patient's mother 

became concerned when she had a fever of 100.5 today. She was worried that it 

might be getting infected so she brought her in for evaluation. The patient has 

a port because she is getting IVIG injections. They will install a new port 

soon as she has further treatment needed. Patient denies chills. She is feeling 

normal for her. She has had some mild tightness in her chest but is unsure if 

that is related to removal of the port as the pain is on the same side. She 

denies shortness of breath or difficulty breathing. She denies dysuria or 

urinary frequency.





Review of Systems


Review of Systems





Constitutional: Denies fever or chills []


Eyes: Denies change in visual acuity, redness, or eye pain []


HENT: Denies nasal congestion or sore throat []


Respiratory: Mild shortness of breath []


Cardiovascular: No additional information not addressed in HPI []


GI: Denies abdominal pain, nausea, vomiting, bloody stools or diarrhea []


: Denies dysuria or hematuria []


Musculoskeletal: Denies back pain or joint pain []


Integument: Denies rash or skin lesions. Concern for skin infection at port 

site. []


Neurologic: Denies headache, focal weakness or sensory changes []


Endocrine: Denies polyuria or polydipsia []





All other systems were reviewed and found to be within normal limits, except as 

documented in this note.





Allergies


Allergies





Allergies








Coded Allergies Type Severity Reaction Last Updated Verified


 


  erythromycin base Allergy Intermediate  5/4/16 Yes


 


  I S O L A T I O N *CONTACT* Allergy Unknown  5/5/16 Yes











Physical Exam


Physical Exam





Constitutional: Well developed, well nourished, no acute distress, non-toxic 

appearance. []


HENT: Normocephalic, atraumatic, bilateral external ears normal, oropharynx 

moist, no oral exudates, nose normal. []


Eyes: PERRLA, EOMI, conjunctiva normal, no discharge. [] 


Neck: Normal range of motion, no tenderness, supple, no stridor. [] 


Cardiovascular:Heart rate regular rhythm, no murmur []


Lungs & Thorax:  Bilateral breath sounds clear to auscultation []


Abdomen: Bowel sounds normal, soft, no tenderness, no masses, no pulsatile 

masses. [] 


Skin: Warm, dry, no erythema, no rash. 2 sutures around a wick where the port 

on the right side of her chest was located. The drainage appears to have been 

serosanguineous. The wound is CDI. There is no sign of infection and in fact 

the site is in excellent condition. No current drainage.[] 


Back: No tenderness, no CVA tenderness. [] 


Extremities: No tenderness, no cyanosis, no clubbing, ROM intact, no edema. [] 


Neurologic: Alert and oriented X 3, normal motor function, normal sensory 

function, no focal deficits noted. []


Psychologic: Affect normal, judgement normal, mood normal. []





EKG


EKG


[]





Radiology/Procedures


Radiology/Procedures


[]


Impressions:


PORTABLE CHEST 1V


 


History: Chest wall pain, right sided.


 


Comparison: July 14, 2018


 


Heart size: Within normal limits.


Yesika/mediastinum: Within normal limits


Lungs: Small opacity identified over the right lower lung, not seen 


previously.


Pleura: No evidence of pleural effusion. 


Pneumothorax: None visualized


Bones: Mild right convexity thoracic scoliosis.


Miscellaneous: None


 


Impression:  Development of a small opacity over the right lower lung. 


Nonspecific, but could represent a small infiltrate. Recommend short-term 


follow-up chest x-ray to document that this resolves.


 


Electronically signed by: Robbi Pina MD (8/18/2018 11:58 PM) [place] [id number]














DICTATED AND SIGNED BY:     ROBBI PINA MD


DATE:     08/18/18 1531





CC: KATHY HANLEY DO; LIZ HICKMAN MD ~





Course & Med Decision Making


Course & Med Decision Making


Pertinent Labs and Imaging studies reviewed. (See chart for details)


The patient's labs are unremarkable. Her urine is unremarkable. Her chest x-ray 

however shows possible small infiltrate in the right lower lobe. Given the 

patient's history of significant infections and rapid development, I will treat 

her with Levofloxacin as she is allergic to erythromycin and recommend short-

term follow-up on Monday. I discussed these results with the patient and her 

mother and they're in agreement with this plan. She is stable for discharge at 

this time.


[]





Dragon Disclaimer


Dragon Disclaimer


This electronic medical record was generated, in whole or in part, using a 

voice recognition dictation system.





Departure


Departure:


Referrals:  


LIZ HICKMAN MD (PCP)


Scripts


Levofloxacin (LEVOFLOXACIN) 750 Mg Tablet


1 TAB PO DAILY, #7 TAB


   Prov: KATHY HANLEY DO         8/19/18











KATHY HANLEY DO Aug 18, 2018 22:20

## 2018-08-19 VITALS — SYSTOLIC BLOOD PRESSURE: 109 MMHG | DIASTOLIC BLOOD PRESSURE: 68 MMHG

## 2018-08-19 NOTE — RAD
PORTABLE CHEST 1V

 

History: Chest wall pain, right sided.

 

Comparison: July 14, 2018

 

Heart size: Within normal limits.

Yesika/mediastinum: Within normal limits

Lungs: Small opacity identified over the right lower lung, not seen 

previously.

Pleura: No evidence of pleural effusion. 

Pneumothorax: None visualized

Bones: Mild right convexity thoracic scoliosis.

Miscellaneous: None

 

Impression:  Development of a small opacity over the right lower lung. 

Nonspecific, but could represent a small infiltrate. Recommend short-term 

follow-up chest x-ray to document that this resolves.

 

Electronically signed by: Robbi Pina MD (8/18/2018 11:58 PM) [place] [id number]

## 2018-08-27 ENCOUNTER — HOSPITAL ENCOUNTER (OUTPATIENT)
Dept: HOSPITAL 61 - SURG | Age: 23
Discharge: HOME | End: 2018-08-27
Attending: SURGERY
Payer: OTHER GOVERNMENT

## 2018-08-27 VITALS — SYSTOLIC BLOOD PRESSURE: 104 MMHG | DIASTOLIC BLOOD PRESSURE: 59 MMHG

## 2018-08-27 DIAGNOSIS — Z98.890: ICD-10-CM

## 2018-08-27 DIAGNOSIS — Z45.2: Primary | ICD-10-CM

## 2018-08-27 DIAGNOSIS — Z88.1: ICD-10-CM

## 2018-08-27 DIAGNOSIS — J45.909: ICD-10-CM

## 2018-08-27 DIAGNOSIS — E06.3: ICD-10-CM

## 2018-08-27 DIAGNOSIS — D84.8: ICD-10-CM

## 2018-08-27 DIAGNOSIS — K21.9: ICD-10-CM

## 2018-08-27 DIAGNOSIS — Z79.899: ICD-10-CM

## 2018-08-27 LAB — U PREG PATIENT: NEGATIVE

## 2018-08-27 PROCEDURE — 71045 X-RAY EXAM CHEST 1 VIEW: CPT

## 2018-08-27 PROCEDURE — 81025 URINE PREGNANCY TEST: CPT

## 2018-08-27 PROCEDURE — 77001 FLUOROGUIDE FOR VEIN DEVICE: CPT

## 2018-08-27 PROCEDURE — C1788 PORT, INDWELLING, IMP: HCPCS

## 2018-08-27 PROCEDURE — 36556 INSERT NON-TUNNEL CV CATH: CPT

## 2018-08-27 PROCEDURE — 36561 INSERT TUNNELED CV CATH: CPT

## 2018-08-27 PROCEDURE — A7015 AEROSOL MASK USED W NEBULIZE: HCPCS

## 2018-08-27 RX ADMIN — FENTANYL CITRATE PRN MCG: 50 INJECTION INTRAMUSCULAR; INTRAVENOUS at 12:35

## 2018-08-27 RX ADMIN — FENTANYL CITRATE PRN MCG: 50 INJECTION INTRAMUSCULAR; INTRAVENOUS at 12:41

## 2018-08-27 RX ADMIN — FENTANYL CITRATE PRN MCG: 50 INJECTION INTRAMUSCULAR; INTRAVENOUS at 12:23

## 2018-08-27 RX ADMIN — FENTANYL CITRATE PRN MCG: 50 INJECTION INTRAMUSCULAR; INTRAVENOUS at 12:28

## 2018-08-27 NOTE — OP
DATE OF SURGERY:  08/27/2018



PREOPERATIVE DIAGNOSIS:  Needs venous access.



POSTOPERATIVE DIAGNOSIS:  Needs venous access.



PROCEDURE:  Placement of a left subclavian PowerPort.



SURGEON:  Mynor Edwards MD



ANESTHESIA:  General LMA.



ESTIMATED BLOOD LOSS:  10.



INTRAVENOUS FLUIDS:  300.



INDICATIONS:  The patient is a 22-year-old with immune deficiency disease,

brought for placement of a port on the left side having had a previous

right-sided port removed a week ago.



DESCRIPTION OF PROCEDURE:  The patient brought to the operating suite and the

left chest was prepped and draped in usual sterile fashion having excluded the

right chest with the drapes.  With the patient in Trendelenburg, a 0.25% plain

Marcaine was infiltrated a fingerbreadth below the clavicle at the juncture

medial two-thirds and lateral third.  A small incision was made and a Cook

needle used to gain access into the subclavian vein.  The flexible guidewire was

advanced through the needle into the superior vena cava under fluoroscopic

guidance and the needle was removed.



Table returned to level.  The pocket incision was infiltrated with 0.25%

Marcaine with epinephrine, incised and a pocket developed with blunt dissection

down to the chest wall including all the subcutaneous tissue to buttress the

port.  Catheter laid on the chest and fluoroscopy used to measure an appropriate

length.



The catheter was then tunneled from the insertion site to the pocket site where

it was attached to the reservoir.  The reservoir was seated into the pocket and

with the patient back in Trendelenburg and under fluoroscopic observation, the

dilator and sheath were passed over the wire.  The wire was removed.  The

dilator was removed.  The catheter was threaded through the sheath and the

sheath was then removed.  The patient was taken out of Trendelenburg.  There was

good flow into and out of the catheter at completion of placement.



Skin incisions closed with subcuticular 4-0 Monocryl after closing the pocket

incision with 3-0 Vicryl.  A sterile dressing applied after flushing and

aspirating catheter without difficulty.  Postop chest x-ray showed the tip of

the catheter to reside in superior vena cava without evidence of a pneumothorax.

 The patient awakened from her anesthetic and taken to the recovery room in

satisfactory condition.

 



______________________________

MYNOR EDWARDS MD



DR:  BG/clare  JOB#:  [id number] / [id number]

DD:  08/27/2018 11:59  DT:  08/27/2018 12:29



ALTAGRACIA Mann MD

## 2018-08-27 NOTE — PDOC1
History and Physical


Date of Admission


Date of Admission


DATE: 8/27/18 


TIME: 10:18





Identification/Chief Complaint


Chief Complaint


needs venous access





Source


Source:  Caregiver, Chart review, Patient





History of Present Illness


History of Present Illness


Amanda is a 23 yo young lady with immune deficiency disorder. I removed a right 

sided port a week ago that had eroded through the skin. She returns for 

placement of a new port on the left side.





Past Medical History


Pulmonary:  Asthma


GI:  GERD


Endocrine:  Other (adrenal insufficiency, Hashimoto's thyroiditis)





Past Surgical History


Past Surgical History:  Tonsillectomy (Nissen fundopication, port placements, 

removals)





Family History


Family History:  No Significant





Social History


Smoke:  No


ALCOHOL:  none


Drugs:  None





Current Medications


Current Medications





Current Medications


Ondansetron HCl (Zofran) 4 mg PRN Q6HRS  PRN IV NAUSEA/VOMITING;  Start 8/27/18 

at 07:00;  Stop 8/28/18 at 06:59


Fentanyl Citrate (Fentanyl 2ml Vial) 25 mcg PRN Q5MIN  PRN IV MILD PAIN;  Start 

8/27/18 at 07:00;  Stop 8/28/18 at 06:59


Fentanyl Citrate (Fentanyl 2ml Vial) 50 mcg PRN Q5MIN  PRN IV MODERATE TO 

SEVERE PAIN;  Start 8/27/18 at 07:00;  Stop 8/28/18 at 06:59


Morphine Sulfate (Morphine Sulfate) 1 mg PRN Q10MIN  PRN IV SEVERE PAIN;  Start 

8/27/18 at 07:00;  Stop 8/28/18 at 06:59


Ringer's Solution 1,000 ml @  30 mls/hr Q24H IV  Last administered on 8/27/18at 

09:23;  Start 8/27/18 at 07:00;  Stop 8/27/18 at 18:59


Lidocaine HCl (Xylocaine-Mpf 1% 2ml Vial) 2 ml PRN 1X  PRN ID PRIOR TO IV START

;  Start 8/27/18 at 07:00;  Stop 8/28/18 at 06:59


Hydromorphone HCl (Dilaudid) 0.5 mg PRN Q10MIN  PRN IV SEV PAIN, Second choice;

  Start 8/27/18 at 07:00;  Stop 8/28/18 at 06:59


Heparin Sodium (Porcine) 5000 unit/Sodium Chloride 505 ml @  505 mls/hr 1X  

ONCE IRR ;  Start 8/27/18 at 06:00;  Stop 8/27/18 at 06:59;  Status DC


Levofloxacin/ Dextrose 100 ml @  100 mls/hr 1X PREOP  PRN IV PRIOR TO PROCEDURE

;  Start 8/27/18 at 06:00;  Stop 8/27/18 at 18:00


Propofol 0 ml @ As Directed STK-MED ONCE IV ;  Start 8/27/18 at 09:10;  Stop 8/ 27/18 at 09:11;  Status DC


Dexamethasone Sodium Phosphate (Decadron) 20 mg STK-MED ONCE .ROUTE ;  Start 8/ 27/18 at 09:10;  Stop 8/27/18 at 09:11;  Status DC


Lidocaine HCl (Lidocaine Pf 2% Vial) 5 ml STK-MED ONCE .ROUTE ;  Start 8/27/18 

at 09:10;  Stop 8/27/18 at 09:11;  Status DC


Ondansetron HCl (Zofran) 4 mg STK-MED ONCE .ROUTE ;  Start 8/27/18 at 09:10;  

Stop 8/27/18 at 09:11;  Status DC


Fentanyl Citrate (Fentanyl 2ml Vial) 100 mcg STK-MED ONCE .ROUTE ;  Start 8/27/ 18 at 09:11;  Stop 8/27/18 at 09:12;  Status DC


Midazolam HCl (Versed) 2 mg STK-MED ONCE .ROUTE ;  Start 8/27/18 at 09:11;  

Stop 8/27/18 at 09:12;  Status DC


Scopolamine (Transderm-Scop) 1 patch 1X  ONCE TD  Last administered on 8/27/ 18at 09:30;  Start 8/27/18 at 09:30;  Stop 8/27/18 at 09:31;  Status DC


Propofol 20 ml @ As Directed STK-MED ONCE IV ;  Start 8/27/18 at 09:46;  Stop 8/ 27/18 at 09:47;  Status DC





Active Scripts


Active


Reported


[Vitamin B12]    IV EVERY THREE WEEKS


[Ig infusions]     EVERY 3 WEEKS


Linzess (Linaclotide) 290 Mcg Capsule 290 Mcg PO QEVNG


Symbicort 160-4.5 Mcg Inhaler (Budesonide/Formoterol Fumarate) 10.2 Gm 

Hfa.aer.ad 2 Puff IH BID


Albuterol Sulfate Neb Soln (Albuterol Sulfate) 1.25 Mg/3 Ml Vial.neb 1 Vial NEB 

Q4-6HRS PRN


Zofran (Ondansetron Hcl) 4 Mg Tablet 4 Mg PO BID PRN


Phenergan (Promethazine Hcl) 25 Mg/1 Ml Ampul 25 Mg IJ PRN PRN


Reglan (Metoclopramide Hcl) 10 Mg Tablet 1 Tab PO TID PRN


Nexium Capsule (Esomeprazole Magnesium) 40 Mg Capsule.dr 1 Cap PO DAILY


Metoprolol Tartrate 25 Mg Tablet 1 Tab PO BID


Cortef (Hydrocortisone) 5 Mg Tablet 5 Mg PO TID





Allergies


Allergies:  


Coded Allergies:  


     erythromycin base (Verified  Allergy, Intermediate, RASH, DIARRHEA, 8/23/18

)





ROS


Review of System


negative





Physical Exam


General:  Alert, Oriented X3, Cooperative, No acute distress


HEENT:  Atraumatic


Lungs:  Normal air movement, Other (healing right upper chest incision, old 

scar left upper chest from previous port placement)


Heart:  RRR


Abdomen:  Soft


Extremities:  No clubbing





Vitals


Vitals





Vital Signs








  Date Time  Temp Pulse Resp B/P (MAP) Pulse Ox O2 Delivery O2 Flow Rate FiO2


 


8/27/18 09:40      Room Air  


 


8/27/18 09:20 98.2 68 16  98   





 98.2       


 


8/27/18 09:18    105/68    











Labs


Labs





Laboratory Tests








Test


 8/27/18


09:10


 


Urine Pregnancy Test Negative (NEG) 








Laboratory Tests








Test


 8/27/18


09:10


 


Urine Pregnancy Test Negative (NEG) 











VTE Prophylaxis Ordered


VTE Prophylaxis Devices:  Yes


VTE Pharmacological Prophylaxi:  No





Assessment/Plan


Assessment/Plan


needs venous access





R/B/A discussed with Amanda and her mom


she will proceed.











DANISH EDWARDS MD Aug 27, 2018 10:23

## 2018-08-27 NOTE — RAD
Portable chest, 8/27/2018:

 

HISTORY: Preop evaluation for Port-A-Cath insertion

 

The heart size is normal. The lungs are clear. There is no evidence of 

pleural fluid.

 

IMPRESSION: No acute cardiopulmonary abnormality is detected.

 

Electronically signed by: Rick Moritz, MD (8/27/2018 9:27 AM) [facility]

## 2018-08-27 NOTE — RAD
CHEST AP ONLY dated 8/27/2018 11:20 AM.

 

Comparison: 8/27/2018

 

Clinical Indication: post op port a cath placement.

 

Findings:

 

Single upright portable exam performed. Heart and mediastinal contours are

stable. Interval placement of left-sided port with tip projected to the 

level of the mid SVC. Lungs are hyperinflated but otherwise clear. No 

consolidation or pleural effusion. No pneumothorax

 

Impression:

 

Left-sided port with no evidence of pneumothorax 

 

Electronically signed by: Robbi Gleason MD (8/27/2018 11:30 AM) 

[facility]

## 2018-08-27 NOTE — PDOC
BRIEF OPERATIVE NOTE


Date:  Aug 27, 2018


Pre-Op Diagnosis


needs venous access


Post-Op Diagnosis


same


Procedure Performed


placement left subclavian power port


Surgeon


Lasha


Anesthesia Type:  General


Blood Loss


10cc


IV Fluid


300cc


Specimens Obtained


none


Findings


post procedure CXR shows catheter tip in SVC without evidence of a pneumothorax


Complications


none


Operative Note


Wk # [id number]











DANISH EDWARDS MD Aug 27, 2018 11:59

## 2018-08-27 NOTE — DISCH
DISCHARGE INSTRUCTIONS


Condition on Discharge


Condition on Discharge:  Stable





Activity After Discharge


Activity Instructions for Disc:  Activity as tolerated, Avoid exertion


Lifting Instructions after Dis:  No heavy lifting, No pulling or pushing


Driving Instructions after Dis:  Do not drive today





Diet after Discharge


Diet after Discharge:  Regular





Wound Incision Care


Wound/Incision Care:  Ice to area for comfort


Other wound/incision instructi:  may shower Wednesday





Follow-Up


Follow Up With:  Lasha next week











DANISH EDWARDS MD Aug 27, 2018 11:53

## 2018-09-10 VITALS — DIASTOLIC BLOOD PRESSURE: 61 MMHG | SYSTOLIC BLOOD PRESSURE: 101 MMHG

## 2018-10-01 ENCOUNTER — HOSPITAL ENCOUNTER (OUTPATIENT)
Dept: HOSPITAL 63 - CT | Age: 23
Discharge: HOME | End: 2018-10-01
Attending: INTERNAL MEDICINE
Payer: OTHER GOVERNMENT

## 2018-10-01 DIAGNOSIS — M79.662: Primary | ICD-10-CM

## 2018-10-01 PROCEDURE — 73700 CT LOWER EXTREMITY W/O DYE: CPT

## 2018-10-01 NOTE — RAD
CT of the left lower leg without contrast, 10/1/2018:

 

HISTORY: Pain

 

Noncontrast scans were obtained as requested. No fracture or destructive 

bony lesion is seen. No abnormal fluid collection or mass is evident.

 

IMPRESSION: No significant abnormality is detected.

 

 

PQ[employer] Compliance Statement:

 

One or more of the following individualized dose reduction techniques were

utilized for this examination:

1. Automated exposure control

2. Adjustment of the mA and/or kV according to patient size

3. Use of iterative reconstruction technique

 

Electronically signed by: Rick Moritz, MD (10/1/2018 4:56 PM) [facility]

## 2019-06-04 ENCOUNTER — HOSPITAL ENCOUNTER (EMERGENCY)
Dept: HOSPITAL 63 - ER | Age: 24
Discharge: HOME | End: 2019-06-04
Payer: OTHER GOVERNMENT

## 2019-06-04 VITALS — DIASTOLIC BLOOD PRESSURE: 67 MMHG | SYSTOLIC BLOOD PRESSURE: 104 MMHG

## 2019-06-04 VITALS — WEIGHT: 105 LBS | BODY MASS INDEX: 16.48 KG/M2 | HEIGHT: 67 IN

## 2019-06-04 DIAGNOSIS — Z88.1: ICD-10-CM

## 2019-06-04 DIAGNOSIS — K59.00: Primary | ICD-10-CM

## 2019-06-04 DIAGNOSIS — Z91.041: ICD-10-CM

## 2019-06-04 DIAGNOSIS — K21.9: ICD-10-CM

## 2019-06-04 DIAGNOSIS — J45.909: ICD-10-CM

## 2019-06-04 LAB
ALBUMIN SERPL-MCNC: 3.9 G/DL (ref 3.4–5)
ALBUMIN/GLOB SERPL: 1 {RATIO} (ref 1–1.7)
ALP SERPL-CCNC: 56 U/L (ref 46–116)
ALT SERPL-CCNC: 18 U/L (ref 14–59)
ANION GAP SERPL CALC-SCNC: 8 MMOL/L (ref 6–14)
APTT PPP: YELLOW S
AST SERPL-CCNC: 14 U/L (ref 15–37)
BACTERIA #/AREA URNS HPF: (no result) /HPF
BASOPHILS # BLD AUTO: 0.1 X10^3/UL (ref 0–0.2)
BASOPHILS NFR BLD: 1 % (ref 0–3)
BILIRUB SERPL-MCNC: 0.5 MG/DL (ref 0.2–1)
BILIRUB UR QL STRIP: (no result)
BUN/CREAT SERPL: 17 (ref 6–20)
CA-I SERPL ISE-MCNC: 10 MG/DL (ref 7–20)
CALCIUM SERPL-MCNC: 9.1 MG/DL (ref 8.5–10.1)
CHLORIDE SERPL-SCNC: 103 MMOL/L (ref 98–107)
CO2 SERPL-SCNC: 26 MMOL/L (ref 21–32)
CREAT SERPL-MCNC: 0.6 MG/DL (ref 0.6–1)
EOSINOPHIL NFR BLD: 0.2 X10^3/UL (ref 0–0.7)
EOSINOPHIL NFR BLD: 4 % (ref 0–3)
ERYTHROCYTE [DISTWIDTH] IN BLOOD BY AUTOMATED COUNT: 12.9 % (ref 11.5–14.5)
FIBRINOGEN PPP-MCNC: (no result) MG/DL
GFR SERPLBLD BASED ON 1.73 SQ M-ARVRAT: 123.9 ML/MIN
GLOBULIN SER-MCNC: 4.1 G/DL (ref 2.2–3.8)
GLUCOSE SERPL-MCNC: 80 MG/DL (ref 70–99)
GLUCOSE UR STRIP-MCNC: (no result) MG/DL
HCT VFR BLD CALC: 38.9 % (ref 36–47)
HGB BLD-MCNC: 13.4 G/DL (ref 12–15.5)
LIPASE: 126 U/L (ref 73–393)
LYMPHOCYTES # BLD: 1.8 X10^3/UL (ref 1–4.8)
LYMPHOCYTES NFR BLD AUTO: 33 % (ref 24–48)
MAGNESIUM SERPL-MCNC: 2 MG/DL (ref 1.8–2.4)
MCH RBC QN AUTO: 30 PG (ref 25–35)
MCHC RBC AUTO-ENTMCNC: 34 G/DL (ref 31–37)
MCV RBC AUTO: 87 FL (ref 79–100)
MONO #: 0.5 X10^3/UL (ref 0–1.1)
MONOCYTES NFR BLD: 8 % (ref 0–9)
NEUT #: 3 X10^3UL (ref 1.8–7.7)
NEUTROPHILS NFR BLD AUTO: 54 % (ref 31–73)
NITRITE UR QL STRIP: (no result)
PLATELET # BLD AUTO: 235 X10^3/UL (ref 140–400)
POTASSIUM SERPL-SCNC: 3.8 MMOL/L (ref 3.5–5.1)
PROT SERPL-MCNC: 8 G/DL (ref 6.4–8.2)
RBC # BLD AUTO: 4.47 X10^6/UL (ref 3.5–5.4)
RBC #/AREA URNS HPF: (no result) /HPF (ref 0–2)
SODIUM SERPL-SCNC: 137 MMOL/L (ref 136–145)
SP GR UR STRIP: 1.01
SQUAMOUS #/AREA URNS LPF: (no result) /LPF
UROBILINOGEN UR-MCNC: 0.2 MG/DL
WBC # BLD AUTO: 5.5 X10^3/UL (ref 4–11)
WBC #/AREA URNS HPF: (no result) /HPF (ref 0–4)

## 2019-06-04 PROCEDURE — 85025 COMPLETE CBC W/AUTO DIFF WBC: CPT

## 2019-06-04 PROCEDURE — 83690 ASSAY OF LIPASE: CPT

## 2019-06-04 PROCEDURE — 36415 COLL VENOUS BLD VENIPUNCTURE: CPT

## 2019-06-04 PROCEDURE — 83735 ASSAY OF MAGNESIUM: CPT

## 2019-06-04 PROCEDURE — 83605 ASSAY OF LACTIC ACID: CPT

## 2019-06-04 PROCEDURE — 81001 URINALYSIS AUTO W/SCOPE: CPT

## 2019-06-04 PROCEDURE — 96375 TX/PRO/DX INJ NEW DRUG ADDON: CPT

## 2019-06-04 PROCEDURE — 87186 SC STD MICRODIL/AGAR DIL: CPT

## 2019-06-04 PROCEDURE — 96374 THER/PROPH/DIAG INJ IV PUSH: CPT

## 2019-06-04 PROCEDURE — 80053 COMPREHEN METABOLIC PANEL: CPT

## 2019-06-04 PROCEDURE — 74177 CT ABD & PELVIS W/CONTRAST: CPT

## 2019-06-04 PROCEDURE — 99285 EMERGENCY DEPT VISIT HI MDM: CPT

## 2019-06-04 PROCEDURE — 87086 URINE CULTURE/COLONY COUNT: CPT

## 2019-06-04 NOTE — RAD
EXAM: Abdomen and pelvis CT with intravenous contrast.

 

HISTORY: Pain and nausea.

 

TECHNIQUE: Computed tomographic images of the abdomen and pelvis were 

obtained following the administration of 75 cc Isovue 370 intravenous 

contrast. Multiplanar reformatting was performed. 

*One or more of the following individualized dose reduction techniques 

were utilized for this examination:  

1. Automated exposure control.  

2. Adjustment of the mA and/or kV according to patient size.  

3. Use of iterative reconstruction technique.

 

COMPARISON: 3/1/2018.

 

FINDINGS: Evaluation of the lower thorax demonstrates no infiltrate or 

pleural effusion. There is a tiny hiatal hernia or patulous distal 

esophagus. No focal hepatic lesion is seen. The gallbladder, pancreas, 

spleen and adrenal glands are unremarkable. There is a gastrostomy tube 

within the stomach. There is wall thickening involving the gastric antrum 

which may be due to relative under distention.

 

There is a prominent renal collecting system, without clear obstructive 

uropathy. The urinary bladder is unremarkable. There is a 4.9 cm cyst 

within the posterior cul-de-sac, likely ovarian or paraovarian in 

etiology. There are multiple bilateral ovarian follicles. There is 

moderate colonic stool. The appendix is not seen. There is no evidence of 

bowel obstruction. There is a stimulator lead within the left presacral 

space.

 

IMPRESSION: 

1. Moderate colonic stool. There is no evidence of obstruction or colitis.

The appendix is not seen.

2. Gastric antral wall thickening. This may be due to relative under 

distention or gastritis. There is a gastrostomy tube in expected position.

3. 4.9 cm cystic lesion within the right posterior cul-de-sac, likely 

ovarian in etiology.

 

Electronically signed by: Farrah Warren MD (6/4/2019 5:16 PM) [facility]

## 2019-06-04 NOTE — PHYS DOC
Past History


Past Medical History:  Asthma, GERD, Other


Additional Past Medical Histor:  history of TBI, history of autoimmune disorder.


Past Surgical History:  Tonsillectomy


Additional Past Surgical Histo:  PEG tube


Smoking:  Non-smoker


Alcohol Use:  None


Drug Use:  None





Adult General


Chief Complaint


Chief Complaint:  ABDOMINAL PAIN





HPI


HPI


23-year-old female presents with 2 day history of upper abdominal pain primarily

to epigastrium with associated nausea. Denies any constipation, diarrhea, or 

vomiting. Denies fever or chills. Patient does have a history of frequent 

urinary tract/kidney infections. Patient requires straight catheterizations. 

Denies pregnancy.





Review of Systems


Review of Systems


Constitutional: Denies fever or chills 


Eyes: Denies redness or eye pain 


HENT: Denies nasal congestion or sore throat


Respiratory: Denies cough or shortness of breath 


Cardiovascular: Denies chest pain or palpitations


GI: Reports abdominal pain and nausea


/GYN: Denies dysuria or hematuria or pregnancy


Musculoskeletal: Denies back pain or joint pain


Integument: Denies rash or skin lesions 


Neurologic: Denies headache, focal weakness or sensory changes





Complete systems were reviewed and found to be within normal limits, except as 

documented in this note.





Current Medications


Current Medications





Current Medications








 Medications


  (Trade)  Dose


 Ordered  Sig/Floyd  Start Time


 Stop Time Status Last Admin


Dose Admin


 


 Famotidine


  (Pepcid Vial)  20 mg  1X  ONCE  6/4/19 15:30


 6/4/19 15:31 DC 6/4/19 15:45


20 MG


 


 Iohexol


  (Omnipaque 240


 Mg/ml)  50 ml  STK-MED ONCE  6/4/19 15:26


 6/4/19 15:27 DC  





 


 Iohexol


  (Omnipaque 300


 Mg/ml)  75 ml  1X  ONCE  6/4/19 15:45


 6/4/19 15:48 DC  





 


 Ketorolac


 Tromethamine


  (Toradol 15mg


 Vial)  15 mg  1X  ONCE  6/4/19 15:30


 6/4/19 15:31 DC 6/4/19 15:45


15 MG


 


 Ondansetron HCl


  (Zofran)  4 mg  1X  ONCE  6/4/19 15:30


 6/4/19 15:31 DC 6/4/19 15:45


4 MG


 


 Sodium Chloride  1,000 ml @ 


 1,000 mls/hr  1X  ONCE  6/4/19 15:30


 6/4/19 16:30 DC 6/4/19 15:45


1,000 MLS/HR











Allergies


Allergies





Allergies








Coded Allergies Type Severity Reaction Last Updated Verified


 


  erythromycin base Allergy Intermediate  8/18/18 Yes


 


  I S O L A T I O N *CONTACT* Allergy Unknown  8/18/18 Yes











Physical Exam


Physical Exam


Constitutional: Well developed, well nourished, no acute distress, non-toxic 

appearance


HENT: Normocephalic, atraumatic, oropharynx moist


Eyes: Conjunctiva normal, no discharge


Neck: Normal range of motion, no tenderness, supple


Cardiovascular: Heart rate normal, regular rhythm


Lungs & Thorax:  Bilateral breath sounds clear to auscultation, no 

wheezing/rales/rhonchi


Abdomen: Soft, epigastric tenderness on palpation, PEG tube to left upper 

quadrant


Skin: Warm, dry, no erythema, no rash


Back: No tenderness, mild left CVA tenderness


Extremities: No tenderness, ROM intact, no edema


Neurologic: Alert and oriented X 3, speech normal, patient with limited movement

of lower extremities at baseline


Psychologic: Affect flat, judgement normal





Current Patient Data


Vital Signs





                                   Vital Signs








  Date Time  Temp Pulse Resp B/P (MAP) Pulse Ox O2 Delivery O2 Flow Rate FiO2


 


6/4/19 15:24 98.8 79 16  97 Room Air  








Lab Results





                                Laboratory Tests








Test


 6/4/19


15:30


 


White Blood Count


 5.5 x10^3/uL


(4.0-11.0)


 


Red Blood Count


 4.47 x10^6/uL


(3.50-5.40)


 


Hemoglobin


 13.4 g/dL


(12.0-15.5)


 


Hematocrit


 38.9 %


(36.0-47.0)


 


Mean Corpuscular Volume


 87 fL ()





 


Mean Corpuscular Hemoglobin 30 pg (25-35)  


 


Mean Corpuscular Hemoglobin


Concent 34 g/dL


(31-37)


 


Red Cell Distribution Width


 12.9 %


(11.5-14.5)


 


Platelet Count


 235 x10^3/uL


(140-400)


 


Neutrophils (%) (Auto) 54 % (31-73)  


 


Lymphocytes (%) (Auto) 33 % (24-48)  


 


Monocytes (%) (Auto) 8 % (0-9)  


 


Eosinophils (%) (Auto) 4 % (0-3)  H


 


Basophils (%) (Auto) 1 % (0-3)  


 


Neutrophils # (Auto)


 3.0 x10^3uL


(1.8-7.7)


 


Lymphocytes # (Auto)


 1.8 x10^3/uL


(1.0-4.8)


 


Monocytes # (Auto)


 0.5 x10^3/uL


(0.0-1.1)


 


Eosinophils # (Auto)


 0.2 x10^3/uL


(0.0-0.7)


 


Basophils # (Auto)


 0.1 x10^3/uL


(0.0-0.2)


 


Sodium Level


 137 mmol/L


(136-145)


 


Potassium Level


 3.8 mmol/L


(3.5-5.1)


 


Chloride Level


 103 mmol/L


()


 


Carbon Dioxide Level


 26 mmol/L


(21-32)


 


Anion Gap 8 (6-14)  


 


Blood Urea Nitrogen


 10 mg/dL


(7-20)


 


Creatinine


 0.6 mg/dL


(0.6-1.0)


 


Estimated GFR


(Cockcroft-Gault) 123.9  





 


BUN/Creatinine Ratio 17 (6-20)  


 


Glucose Level


 80 mg/dL


(70-99)


 


Lactic Acid Level


 0.8 mmol/L


(0.4-2.0)


 


Calcium Level


 9.1 mg/dL


(8.5-10.1)


 


Magnesium Level


 2.0 mg/dL


(1.8-2.4)


 


Total Bilirubin


 0.5 mg/dL


(0.2-1.0)


 


Aspartate Amino Transferase


(AST) 14 U/L (15-37)


L


 


Alanine Aminotransferase (ALT)


 18 U/L (14-59)





 


Alkaline Phosphatase


 56 U/L


()


 


Total Protein


 8.0 g/dL


(6.4-8.2)


 


Albumin


 3.9 g/dL


(3.4-5.0)


 


Albumin/Globulin Ratio 1.0 (1.0-1.7)  


 


Lipase


 126 U/L


()











EKG


EKG


[]





Radiology/Procedures


Radiology/Procedures


PROCEDURE: CT ABD PELV W/ORAL&IV CONTRAST





 


EXAM: Abdomen and pelvis CT with intravenous contrast.


 


HISTORY: Pain and nausea.


 


TECHNIQUE: Computed tomographic images of the abdomen and pelvis were 


obtained following the administration of 75 cc Isovue 370 intravenous 


contrast. Multiplanar reformatting was performed. 


*One or more of the following individualized dose reduction techniques 


were utilized for this examination:  


1. Automated exposure control.  


2. Adjustment of the mA and/or kV according to patient size.  


3. Use of iterative reconstruction technique.


 


COMPARISON: 3/1/2018.


 


FINDINGS: Evaluation of the lower thorax demonstrates no infiltrate or 


pleural effusion. There is a tiny hiatal hernia or patulous distal 


esophagus. No focal hepatic lesion is seen. The gallbladder, pancreas, 


spleen and adrenal glands are unremarkable. There is a gastrostomy tube 


within the stomach. There is wall thickening involving the gastric antrum 


which may be due to relative under distention.


 


There is a prominent renal collecting system, without clear obstructive 


uropathy. The urinary bladder is unremarkable. There is a 4.9 cm cyst 


within the posterior cul-de-sac, likely ovarian or paraovarian in 


etiology. There are multiple bilateral ovarian follicles. There is 


moderate colonic stool. The appendix is not seen. There is no evidence of 


bowel obstruction. There is a stimulator lead within the left presacral 


space.


 


IMPRESSION: 


1. Moderate colonic stool. There is no evidence of obstruction or colitis.


The appendix is not seen.


2. Gastric antral wall thickening. This may be due to relative under 


distention or gastritis. There is a gastrostomy tube in expected position.


3. 4.9 cm cystic lesion within the right posterior cul-de-sac, likely 


ovarian in etiology.





Course & Med Decision Making


Course & Med Decision Making


Pertinent Labs and Imaging studies reviewed. (See chart for details)





Patient presents with 2 day history of epigastric abdominal pain with associated

 nausea. Afebrile. Symptomatic treatment provided. IV fluid hydration given. 

Labs obtained and posted to chart. CT abdomen/pelvis with findings of possible 

gastritis and moderate constipation.





Patient stable for discharge with outpatient follow-up with PCP/GI. GI referral 

provided. Discussed findings and plan with patient and family, who acknowledge 

understanding and agreement.





Dragon Disclaimer


Dragon Disclaimer


This electronic medical record was generated, in whole or in part, using a voice

 recognition dictation system.





Departure


Departure:


Impression:  


   Primary Impression:  


   Epigastric abdominal pain


   Additional Impression:  


   Constipation


Disposition:  01 HOME, SELF-CARE


Condition:  STABLE


Referrals:  


LIZ HICKMAN MD (PCP)








JAYA HERNÁNDEZ MD


Patient Instructions:  Abdominal Pain (Nonspecific), Gastritis, Adult, 

Easy-to-Read


Scripts


Sucralfate (CARAFATE) 1 Gm/10 Ml Oral.susp


10 ML PO QID for gastritis, #1200 ML


   Prov: RICH SWIFT DO         6/4/19 


Magnesium Citrate (MAGNESIUM CITRATE) 296 Ml Solution


296 ML PO ONCE PRN for CONSTIPATION, #296 ML


   Prov: RICH SWIFT DO         6/4/19 


Sennosides/Docusate Sodium (Colace 2-in-1 Tablet) 1 Each Tablet


1 EACH PO QHS PRN for CONSTIPATION, #20 TAB


   Prov: RICH SWIFT DO         6/4/19 


Ondansetron (ONDANSETRON ODT) 4 Mg Tab.rapdis


1 TAB PO PRN Q6-8HRS for NAUSEA, #16 TAB


   Prov: RICH SWIFT DO         6/4/19 


Famotidine (PEPCID) 20 Mg Tablet


1 TAB PO BID for Gastritis, #30 TAB


   Prov: RICH SWIFT DO         6/4/19





Problem Qualifiers








   Additional Impression:  


   Constipation


   Constipation type:  unspecified constipation type  Qualified Codes:  K59.00 -

    Constipation, unspecified








RICH SWIFT DO              Jun 4, 2019 16:41

## 2019-09-02 ENCOUNTER — HOSPITAL ENCOUNTER (OUTPATIENT)
Dept: HOSPITAL 63 - LAB | Age: 24
Discharge: HOME | End: 2019-09-02
Payer: OTHER GOVERNMENT

## 2019-09-02 VITALS — DIASTOLIC BLOOD PRESSURE: 65 MMHG | SYSTOLIC BLOOD PRESSURE: 121 MMHG

## 2019-09-02 VITALS — SYSTOLIC BLOOD PRESSURE: 120 MMHG | DIASTOLIC BLOOD PRESSURE: 68 MMHG

## 2019-09-02 VITALS — DIASTOLIC BLOOD PRESSURE: 62 MMHG | SYSTOLIC BLOOD PRESSURE: 118 MMHG

## 2019-09-02 VITALS — SYSTOLIC BLOOD PRESSURE: 107 MMHG | DIASTOLIC BLOOD PRESSURE: 62 MMHG

## 2019-09-02 DIAGNOSIS — K21.9: ICD-10-CM

## 2019-09-02 DIAGNOSIS — D83.9: Primary | ICD-10-CM

## 2019-09-02 DIAGNOSIS — J45.909: ICD-10-CM

## 2019-09-02 LAB
ALBUMIN SERPL-MCNC: 3.8 G/DL (ref 3.4–5)
ALBUMIN/GLOB SERPL: 1.1 {RATIO} (ref 1–1.7)
ALP SERPL-CCNC: 61 U/L (ref 46–116)
ALT SERPL-CCNC: 13 U/L (ref 14–59)
ANION GAP SERPL CALC-SCNC: 8 MMOL/L (ref 6–14)
AST SERPL-CCNC: 15 U/L (ref 15–37)
BASOPHILS # BLD AUTO: 0.1 X10^3/UL (ref 0–0.2)
BASOPHILS NFR BLD: 3 % (ref 0–3)
BILIRUB SERPL-MCNC: 0.4 MG/DL (ref 0.2–1)
BUN/CREAT SERPL: 10 (ref 6–20)
CA-I SERPL ISE-MCNC: 6 MG/DL (ref 7–20)
CALCIUM SERPL-MCNC: 8.9 MG/DL (ref 8.5–10.1)
CHLORIDE SERPL-SCNC: 105 MMOL/L (ref 98–107)
CO2 SERPL-SCNC: 27 MMOL/L (ref 21–32)
CREAT SERPL-MCNC: 0.6 MG/DL (ref 0.6–1)
EOSINOPHIL NFR BLD: 0.1 X10^3/UL (ref 0–0.7)
EOSINOPHIL NFR BLD: 2 % (ref 0–3)
ERYTHROCYTE [DISTWIDTH] IN BLOOD BY AUTOMATED COUNT: 12.2 % (ref 11.5–14.5)
GFR SERPLBLD BASED ON 1.73 SQ M-ARVRAT: 123.9 ML/MIN
GLOBULIN SER-MCNC: 3.4 G/DL (ref 2.2–3.8)
GLUCOSE SERPL-MCNC: 78 MG/DL (ref 70–99)
HCT VFR BLD CALC: 38.2 % (ref 36–47)
HGB BLD-MCNC: 12.6 G/DL (ref 12–15.5)
LYMPHOCYTES # BLD: 1.7 X10^3/UL (ref 1–4.8)
LYMPHOCYTES NFR BLD AUTO: 42 % (ref 24–48)
MCH RBC QN AUTO: 29 PG (ref 25–35)
MCHC RBC AUTO-ENTMCNC: 33 G/DL (ref 31–37)
MCV RBC AUTO: 89 FL (ref 79–100)
MONO #: 0.3 X10^3/UL (ref 0–1.1)
MONOCYTES NFR BLD: 8 % (ref 0–9)
NEUT #: 1.9 X10^3UL (ref 1.8–7.7)
NEUTROPHILS NFR BLD AUTO: 46 % (ref 31–73)
PLATELET # BLD AUTO: 267 X10^3/UL (ref 140–400)
POTASSIUM SERPL-SCNC: 3.6 MMOL/L (ref 3.5–5.1)
PROT SERPL-MCNC: 7.2 G/DL (ref 6.4–8.2)
RBC # BLD AUTO: 4.28 X10^6/UL (ref 3.5–5.4)
SODIUM SERPL-SCNC: 140 MMOL/L (ref 136–145)
WBC # BLD AUTO: 4.1 X10^3/UL (ref 4–11)

## 2019-09-02 PROCEDURE — 96366 THER/PROPH/DIAG IV INF ADDON: CPT

## 2019-09-02 PROCEDURE — 96365 THER/PROPH/DIAG IV INF INIT: CPT

## 2019-09-02 PROCEDURE — 82787 IGG 1 2 3 OR 4 EACH: CPT

## 2019-09-02 PROCEDURE — 80053 COMPREHEN METABOLIC PANEL: CPT

## 2019-09-02 PROCEDURE — 85025 COMPLETE CBC W/AUTO DIFF WBC: CPT

## 2019-09-02 PROCEDURE — 36415 COLL VENOUS BLD VENIPUNCTURE: CPT

## 2019-09-02 PROCEDURE — 96375 TX/PRO/DX INJ NEW DRUG ADDON: CPT

## 2019-09-04 LAB
IGG SERPL-MCNC: 1327 MG/DL (ref 700–1600)
IGG1 SER-MCNC: 797 MG/DL (ref 248–810)
IGG2 SER-MCNC: 476 MG/DL (ref 130–555)
IGG3 SER-MCNC: 28 MG/DL (ref 15–102)
IGG4 SER-MCNC: 30 MG/DL (ref 2–96)

## 2020-10-05 ENCOUNTER — HOSPITAL ENCOUNTER (OUTPATIENT)
Dept: HOSPITAL 63 - OPINF | Age: 25
End: 2020-10-05
Attending: INTERNAL MEDICINE
Payer: OTHER GOVERNMENT

## 2020-10-05 VITALS — SYSTOLIC BLOOD PRESSURE: 98 MMHG | DIASTOLIC BLOOD PRESSURE: 58 MMHG

## 2020-10-05 VITALS — SYSTOLIC BLOOD PRESSURE: 98 MMHG | DIASTOLIC BLOOD PRESSURE: 63 MMHG

## 2020-10-05 VITALS — SYSTOLIC BLOOD PRESSURE: 98 MMHG | DIASTOLIC BLOOD PRESSURE: 61 MMHG

## 2020-10-05 VITALS — SYSTOLIC BLOOD PRESSURE: 100 MMHG | DIASTOLIC BLOOD PRESSURE: 59 MMHG

## 2020-10-05 VITALS — SYSTOLIC BLOOD PRESSURE: 99 MMHG | DIASTOLIC BLOOD PRESSURE: 59 MMHG

## 2020-10-05 VITALS — SYSTOLIC BLOOD PRESSURE: 98 MMHG | DIASTOLIC BLOOD PRESSURE: 60 MMHG

## 2020-10-05 DIAGNOSIS — J45.909: ICD-10-CM

## 2020-10-05 DIAGNOSIS — K21.9: ICD-10-CM

## 2020-10-05 DIAGNOSIS — D83.9: Primary | ICD-10-CM

## 2020-10-05 LAB
ALBUMIN SERPL-MCNC: 3.6 G/DL (ref 3.4–5)
ALBUMIN/GLOB SERPL: 0.9 {RATIO} (ref 1–1.7)
ALP SERPL-CCNC: 54 U/L (ref 46–116)
ALT SERPL-CCNC: 19 U/L (ref 14–59)
ANION GAP SERPL CALC-SCNC: 11 MMOL/L (ref 6–14)
AST SERPL-CCNC: 15 U/L (ref 15–37)
BASOPHILS # BLD AUTO: 0.1 X10^3/UL (ref 0–0.2)
BASOPHILS NFR BLD: 1 % (ref 0–3)
BILIRUB SERPL-MCNC: 0.1 MG/DL (ref 0.2–1)
BUN/CREAT SERPL: 20 (ref 6–20)
CA-I SERPL ISE-MCNC: 12 MG/DL (ref 7–20)
CALCIUM SERPL-MCNC: 8.8 MG/DL (ref 8.5–10.1)
CHLORIDE SERPL-SCNC: 104 MMOL/L (ref 98–107)
CO2 SERPL-SCNC: 23 MMOL/L (ref 21–32)
CREAT SERPL-MCNC: 0.6 MG/DL (ref 0.6–1)
EOSINOPHIL NFR BLD: 0.1 X10^3/UL (ref 0–0.7)
EOSINOPHIL NFR BLD: 1 % (ref 0–3)
ERYTHROCYTE [DISTWIDTH] IN BLOOD BY AUTOMATED COUNT: 13.1 % (ref 11.5–14.5)
GFR SERPLBLD BASED ON 1.73 SQ M-ARVRAT: 122.8 ML/MIN
GLOBULIN SER-MCNC: 3.8 G/DL (ref 2.2–3.8)
GLUCOSE SERPL-MCNC: 85 MG/DL (ref 70–99)
HCT VFR BLD CALC: 37.1 % (ref 36–47)
HGB BLD-MCNC: 12.1 G/DL (ref 12–15.5)
LYMPHOCYTES # BLD: 2.1 X10^3/UL (ref 1–4.8)
LYMPHOCYTES NFR BLD AUTO: 44 % (ref 24–48)
MCH RBC QN AUTO: 29 PG (ref 25–35)
MCHC RBC AUTO-ENTMCNC: 33 G/DL (ref 31–37)
MCV RBC AUTO: 89 FL (ref 79–100)
MONO #: 0.4 X10^3/UL (ref 0–1.1)
MONOCYTES NFR BLD: 7 % (ref 0–9)
NEUT #: 2.2 X10^3UL (ref 1.8–7.7)
NEUTROPHILS NFR BLD AUTO: 46 % (ref 31–73)
PLATELET # BLD AUTO: 219 X10^3/UL (ref 140–400)
POTASSIUM SERPL-SCNC: 4 MMOL/L (ref 3.5–5.1)
PROT SERPL-MCNC: 7.4 G/DL (ref 6.4–8.2)
RBC # BLD AUTO: 4.17 X10^6/UL (ref 3.5–5.4)
SODIUM SERPL-SCNC: 138 MMOL/L (ref 136–145)
WBC # BLD AUTO: 4.7 X10^3/UL (ref 4–11)

## 2020-10-05 PROCEDURE — 96374 THER/PROPH/DIAG INJ IV PUSH: CPT

## 2020-10-05 PROCEDURE — 36591 DRAW BLOOD OFF VENOUS DEVICE: CPT

## 2020-10-05 PROCEDURE — 36415 COLL VENOUS BLD VENIPUNCTURE: CPT

## 2020-10-05 PROCEDURE — 96375 TX/PRO/DX INJ NEW DRUG ADDON: CPT

## 2020-10-05 PROCEDURE — 80053 COMPREHEN METABOLIC PANEL: CPT

## 2020-10-05 PROCEDURE — 96367 TX/PROPH/DG ADDL SEQ IV INF: CPT

## 2020-10-05 PROCEDURE — 96365 THER/PROPH/DIAG IV INF INIT: CPT

## 2020-10-05 PROCEDURE — 85025 COMPLETE CBC W/AUTO DIFF WBC: CPT

## 2020-10-05 PROCEDURE — 96366 THER/PROPH/DIAG IV INF ADDON: CPT

## 2020-10-05 NOTE — NUR
NURSING NOTE OUTPATIENT



PT WHEELED TO ROOM 132 FOR OUTPATIENT INFUSION. VITALS OBTAINED. PORT ACCESSED WITH 20G 
1INCH MUNSON NEEDLE. LABS COLLECTED. PT GIVEN IV SOLUMEDROL AND ORAL MEDICATIONS PRIOR TO 
INFUSION. 



INFUSION STARTED AND PT MONITORED CLOSELY TO INCREASE DOSAGE Q15 MIN. INFUSION INCREASE 
SLOWLY. 



INFUSION COMPLETE. PT TOLERATED WELL.



PT WHEELED OFF UNIT. NO COMPLICATIONS.



ОЛЬГА LAWSON.

## 2020-10-26 ENCOUNTER — HOSPITAL ENCOUNTER (OUTPATIENT)
Dept: HOSPITAL 63 - OPINF | Age: 25
Discharge: HOME | End: 2020-10-26
Attending: INTERNAL MEDICINE
Payer: OTHER GOVERNMENT

## 2020-10-26 VITALS — DIASTOLIC BLOOD PRESSURE: 67 MMHG | SYSTOLIC BLOOD PRESSURE: 98 MMHG

## 2020-10-26 VITALS — DIASTOLIC BLOOD PRESSURE: 64 MMHG | SYSTOLIC BLOOD PRESSURE: 110 MMHG

## 2020-10-26 VITALS — DIASTOLIC BLOOD PRESSURE: 68 MMHG | SYSTOLIC BLOOD PRESSURE: 110 MMHG

## 2020-10-26 VITALS — DIASTOLIC BLOOD PRESSURE: 67 MMHG | SYSTOLIC BLOOD PRESSURE: 110 MMHG

## 2020-10-26 VITALS — SYSTOLIC BLOOD PRESSURE: 99 MMHG | DIASTOLIC BLOOD PRESSURE: 63 MMHG

## 2020-10-26 VITALS — DIASTOLIC BLOOD PRESSURE: 61 MMHG | SYSTOLIC BLOOD PRESSURE: 106 MMHG

## 2020-10-26 DIAGNOSIS — G61.81: Primary | ICD-10-CM

## 2020-10-26 DIAGNOSIS — J45.909: ICD-10-CM

## 2020-10-26 DIAGNOSIS — K21.9: ICD-10-CM

## 2020-10-26 LAB
ALBUMIN SERPL-MCNC: 3.5 G/DL (ref 3.4–5)
ALBUMIN/GLOB SERPL: 1 {RATIO} (ref 1–1.7)
ALP SERPL-CCNC: 51 U/L (ref 46–116)
ALT SERPL-CCNC: 20 U/L (ref 14–59)
ANION GAP SERPL CALC-SCNC: 8 MMOL/L (ref 6–14)
AST SERPL-CCNC: 15 U/L (ref 15–37)
BASOPHILS # BLD AUTO: 0.1 X10^3/UL (ref 0–0.2)
BASOPHILS NFR BLD: 2 % (ref 0–3)
BILIRUB SERPL-MCNC: 0.2 MG/DL (ref 0.2–1)
BUN/CREAT SERPL: 20 (ref 6–20)
CA-I SERPL ISE-MCNC: 10 MG/DL (ref 7–20)
CALCIUM SERPL-MCNC: 8.9 MG/DL (ref 8.5–10.1)
CHLORIDE SERPL-SCNC: 105 MMOL/L (ref 98–107)
CO2 SERPL-SCNC: 26 MMOL/L (ref 21–32)
CREAT SERPL-MCNC: 0.5 MG/DL (ref 0.6–1)
EOSINOPHIL NFR BLD: 0.1 X10^3/UL (ref 0–0.7)
EOSINOPHIL NFR BLD: 4 % (ref 0–3)
ERYTHROCYTE [DISTWIDTH] IN BLOOD BY AUTOMATED COUNT: 13.2 % (ref 11.5–14.5)
GFR SERPLBLD BASED ON 1.73 SQ M-ARVRAT: 151.6 ML/MIN
GLOBULIN SER-MCNC: 3.6 G/DL (ref 2.2–3.8)
GLUCOSE SERPL-MCNC: 82 MG/DL (ref 70–99)
HCT VFR BLD CALC: 37.1 % (ref 36–47)
HGB BLD-MCNC: 12.4 G/DL (ref 12–15.5)
LYMPHOCYTES # BLD: 1.5 X10^3/UL (ref 1–4.8)
LYMPHOCYTES NFR BLD AUTO: 43 % (ref 24–48)
MCH RBC QN AUTO: 29 PG (ref 25–35)
MCHC RBC AUTO-ENTMCNC: 33 G/DL (ref 31–37)
MCV RBC AUTO: 88 FL (ref 79–100)
MONO #: 0.3 X10^3/UL (ref 0–1.1)
MONOCYTES NFR BLD: 8 % (ref 0–9)
NEUT #: 1.5 X10^3UL (ref 1.8–7.7)
NEUTROPHILS NFR BLD AUTO: 43 % (ref 31–73)
PLATELET # BLD AUTO: 185 X10^3/UL (ref 140–400)
POTASSIUM SERPL-SCNC: 3.9 MMOL/L (ref 3.5–5.1)
PROT SERPL-MCNC: 7.1 G/DL (ref 6.4–8.2)
RBC # BLD AUTO: 4.21 X10^6/UL (ref 3.5–5.4)
SODIUM SERPL-SCNC: 139 MMOL/L (ref 136–145)
WBC # BLD AUTO: 3.4 X10^3/UL (ref 4–11)

## 2020-10-26 PROCEDURE — 85025 COMPLETE CBC W/AUTO DIFF WBC: CPT

## 2020-10-26 PROCEDURE — 96366 THER/PROPH/DIAG IV INF ADDON: CPT

## 2020-10-26 PROCEDURE — 36415 COLL VENOUS BLD VENIPUNCTURE: CPT

## 2020-10-26 PROCEDURE — 96365 THER/PROPH/DIAG IV INF INIT: CPT

## 2020-10-26 PROCEDURE — 96375 TX/PRO/DX INJ NEW DRUG ADDON: CPT

## 2020-10-26 PROCEDURE — 80053 COMPREHEN METABOLIC PANEL: CPT

## 2020-10-26 NOTE — NUR
NURSING NOTE 



OUTPT INFUSION COMPLETE. PT PORT FLUSHED WITH 20 CC NS AND CAPPED. PT LEFT ACCESSED TO DO 
HER NIGHT TREATMENT POST INFUSION AT HOME. 



NO COMPLICATIONS.



ОЛЬГА LAWSON.

## 2020-10-26 NOTE — NUR
NURSING NOTE 



PT WHEELED TO ROOM 105 FOR OUTPT INFUSION. PORT ACCESSED, LABS OBTAINED, VITALS TAKEN, PT 
GIVEN PRE-MEDICATIONS AS STANDING ORDERS. 



INFUSION STARTED. PT MONITORED CLOSELY. 



ОЛЬГА LAWSON.

## 2021-04-15 ENCOUNTER — HOSPITAL ENCOUNTER (OUTPATIENT)
Dept: HOSPITAL 63 - OPINF | Age: 26
End: 2021-04-15
Attending: INTERNAL MEDICINE
Payer: OTHER GOVERNMENT

## 2021-04-15 VITALS — SYSTOLIC BLOOD PRESSURE: 136 MMHG | DIASTOLIC BLOOD PRESSURE: 74 MMHG

## 2021-04-15 DIAGNOSIS — D83.9: Primary | ICD-10-CM

## 2021-04-15 DIAGNOSIS — J45.909: ICD-10-CM

## 2021-04-15 DIAGNOSIS — K21.9: ICD-10-CM

## 2021-04-15 LAB
ALBUMIN SERPL-MCNC: 3.7 G/DL (ref 3.4–5)
ALBUMIN/GLOB SERPL: 1 {RATIO} (ref 1–1.7)
ALP SERPL-CCNC: 46 U/L (ref 46–116)
ALT SERPL-CCNC: 32 U/L (ref 14–59)
ANION GAP SERPL CALC-SCNC: 5 MMOL/L (ref 6–14)
AST SERPL-CCNC: 18 U/L (ref 15–37)
BASOPHILS # BLD AUTO: 0.1 X10^3/UL (ref 0–0.2)
BASOPHILS NFR BLD: 2 % (ref 0–3)
BILIRUB SERPL-MCNC: 0.3 MG/DL (ref 0.2–1)
BUN/CREAT SERPL: 18 (ref 6–20)
CA-I SERPL ISE-MCNC: 11 MG/DL (ref 7–20)
CALCIUM SERPL-MCNC: 8.5 MG/DL (ref 8.5–10.1)
CHLORIDE SERPL-SCNC: 105 MMOL/L (ref 98–107)
CO2 SERPL-SCNC: 29 MMOL/L (ref 21–32)
CREAT SERPL-MCNC: 0.6 MG/DL (ref 0.6–1)
EOSINOPHIL NFR BLD: 0.1 X10^3/UL (ref 0–0.7)
EOSINOPHIL NFR BLD: 1 % (ref 0–3)
ERYTHROCYTE [DISTWIDTH] IN BLOOD BY AUTOMATED COUNT: 14.3 % (ref 11.5–14.5)
GFR SERPLBLD BASED ON 1.73 SQ M-ARVRAT: 121.8 ML/MIN
GLOBULIN SER-MCNC: 3.7 G/DL (ref 2.2–3.8)
GLUCOSE SERPL-MCNC: 87 MG/DL (ref 70–99)
HCT VFR BLD CALC: 37.7 % (ref 36–47)
HGB BLD-MCNC: 13 G/DL (ref 12–15.5)
LYMPHOCYTES # BLD: 1.6 X10^3/UL (ref 1–4.8)
LYMPHOCYTES NFR BLD AUTO: 41 % (ref 24–48)
MCH RBC QN AUTO: 31 PG (ref 25–35)
MCHC RBC AUTO-ENTMCNC: 34 G/DL (ref 31–37)
MCV RBC AUTO: 91 FL (ref 79–100)
MONO #: 0.3 X10^3/UL (ref 0–1.1)
MONOCYTES NFR BLD: 7 % (ref 0–9)
NEUT #: 1.9 X10^3UL (ref 1.8–7.7)
NEUTROPHILS NFR BLD AUTO: 49 % (ref 31–73)
PLATELET # BLD AUTO: 173 X10^3/UL (ref 140–400)
POTASSIUM SERPL-SCNC: 4.5 MMOL/L (ref 3.5–5.1)
PROT SERPL-MCNC: 7.4 G/DL (ref 6.4–8.2)
RBC # BLD AUTO: 4.15 X10^6/UL (ref 3.5–5.4)
SODIUM SERPL-SCNC: 139 MMOL/L (ref 136–145)
WBC # BLD AUTO: 3.9 X10^3/UL (ref 4–11)

## 2021-04-15 PROCEDURE — 85025 COMPLETE CBC W/AUTO DIFF WBC: CPT

## 2021-04-15 PROCEDURE — 36593 DECLOT VASCULAR DEVICE: CPT

## 2021-04-15 PROCEDURE — 96366 THER/PROPH/DIAG IV INF ADDON: CPT

## 2021-04-15 PROCEDURE — 96365 THER/PROPH/DIAG IV INF INIT: CPT

## 2021-04-15 PROCEDURE — 96375 TX/PRO/DX INJ NEW DRUG ADDON: CPT

## 2021-04-15 PROCEDURE — 80053 COMPREHEN METABOLIC PANEL: CPT

## 2021-04-15 PROCEDURE — 36415 COLL VENOUS BLD VENIPUNCTURE: CPT

## 2021-04-15 RX ADMIN — IMMUNE GLOBULIN (HUMAN) ONE MLS/HR: 10 INJECTION INTRAVENOUS; SUBCUTANEOUS at 15:04

## 2021-04-15 RX ADMIN — ACETAMINOPHEN ONE MG: 325 TABLET, FILM COATED ORAL at 14:00

## 2021-04-15 RX ADMIN — ALTEPLASE ONE MG: 2.2 INJECTION, POWDER, LYOPHILIZED, FOR SOLUTION INTRAVENOUS at 18:48

## 2021-04-15 RX ADMIN — IMMUNE GLOBULIN (HUMAN) ONE MLS/HR: 10 INJECTION INTRAVENOUS; SUBCUTANEOUS at 18:18

## 2021-04-15 RX ADMIN — Medication ONE MLS/HR: at 17:11

## 2021-04-15 RX ADMIN — METHYLPREDNISOLONE SODIUM SUCCINATE ONE MG: 125 INJECTION, POWDER, FOR SOLUTION INTRAMUSCULAR; INTRAVENOUS at 14:57

## 2021-05-31 ENCOUNTER — HOSPITAL ENCOUNTER (EMERGENCY)
Dept: HOSPITAL 63 - ER | Age: 26
Discharge: HOME | End: 2021-05-31
Payer: OTHER GOVERNMENT

## 2021-05-31 VITALS — WEIGHT: 111.33 LBS | HEIGHT: 67 IN | BODY MASS INDEX: 17.47 KG/M2

## 2021-05-31 VITALS — DIASTOLIC BLOOD PRESSURE: 56 MMHG | SYSTOLIC BLOOD PRESSURE: 106 MMHG

## 2021-05-31 DIAGNOSIS — E06.3: ICD-10-CM

## 2021-05-31 DIAGNOSIS — Z99.3: ICD-10-CM

## 2021-05-31 DIAGNOSIS — N31.9: ICD-10-CM

## 2021-05-31 DIAGNOSIS — Z86.2: ICD-10-CM

## 2021-05-31 DIAGNOSIS — Z87.442: ICD-10-CM

## 2021-05-31 DIAGNOSIS — Z87.820: ICD-10-CM

## 2021-05-31 DIAGNOSIS — M54.5: ICD-10-CM

## 2021-05-31 DIAGNOSIS — M53.3: Primary | ICD-10-CM

## 2021-05-31 DIAGNOSIS — D84.9: ICD-10-CM

## 2021-05-31 DIAGNOSIS — Z88.1: ICD-10-CM

## 2021-05-31 DIAGNOSIS — K21.9: ICD-10-CM

## 2021-05-31 DIAGNOSIS — J45.909: ICD-10-CM

## 2021-05-31 DIAGNOSIS — Z87.440: ICD-10-CM

## 2021-05-31 DIAGNOSIS — Z88.8: ICD-10-CM

## 2021-05-31 LAB
ALBUMIN SERPL-MCNC: 3.4 G/DL (ref 3.4–5)
ALP SERPL-CCNC: 97 U/L (ref 46–116)
ALT SERPL-CCNC: 72 U/L (ref 14–59)
ANION GAP SERPL CALC-SCNC: 9 MMOL/L (ref 6–14)
APTT PPP: YELLOW S
AST SERPL-CCNC: 42 U/L (ref 15–37)
BACTERIA #/AREA URNS HPF: (no result) /HPF
BASOPHILS # BLD AUTO: 0.1 X10^3/UL (ref 0–0.2)
BASOPHILS NFR BLD: 1 % (ref 0–3)
BILIRUB DIRECT SERPL-MCNC: 0.1 MG/DL (ref 0–0.2)
BILIRUB SERPL-MCNC: 0.4 MG/DL (ref 0.2–1)
BILIRUB UR QL STRIP: (no result)
CA-I SERPL ISE-MCNC: 10 MG/DL (ref 7–20)
CALCIUM SERPL-MCNC: 9.2 MG/DL (ref 8.5–10.1)
CHLORIDE SERPL-SCNC: 103 MMOL/L (ref 98–107)
CO2 SERPL-SCNC: 25 MMOL/L (ref 21–32)
CREAT SERPL-MCNC: 0.7 MG/DL (ref 0.6–1)
CRP SERPL-MCNC: 24.3 MG/L (ref 0–3.3)
EOSINOPHIL NFR BLD: 0 % (ref 0–3)
EOSINOPHIL NFR BLD: 0 X10^3/UL (ref 0–0.7)
ERYTHROCYTE [DISTWIDTH] IN BLOOD BY AUTOMATED COUNT: 12.9 % (ref 11.5–14.5)
FIBRINOGEN PPP-MCNC: CLEAR MG/DL
GFR SERPLBLD BASED ON 1.73 SQ M-ARVRAT: 102 ML/MIN
GLUCOSE SERPL-MCNC: 100 MG/DL (ref 70–99)
GLUCOSE UR STRIP-MCNC: (no result) MG/DL
HCT VFR BLD CALC: 36.4 % (ref 36–47)
HGB BLD-MCNC: 12.6 G/DL (ref 12–15.5)
LIPASE: 165 U/L (ref 73–393)
LYMPHOCYTES # BLD: 1.7 X10^3/UL (ref 1–4.8)
LYMPHOCYTES NFR BLD AUTO: 21 % (ref 24–48)
MAGNESIUM SERPL-MCNC: 2.2 MG/DL (ref 1.8–2.4)
MCH RBC QN AUTO: 32 PG (ref 25–35)
MCHC RBC AUTO-ENTMCNC: 35 G/DL (ref 31–37)
MCV RBC AUTO: 92 FL (ref 79–100)
MONO #: 0.5 X10^3/UL (ref 0–1.1)
MONOCYTES NFR BLD: 6 % (ref 0–9)
NEUT #: 5.6 X10^3UL (ref 1.8–7.7)
NEUTROPHILS NFR BLD AUTO: 72 % (ref 31–73)
NITRITE UR QL STRIP: (no result)
PLATELET # BLD AUTO: 238 X10^3/UL (ref 140–400)
POTASSIUM SERPL-SCNC: 3.9 MMOL/L (ref 3.5–5.1)
PROT SERPL-MCNC: 8.6 G/DL (ref 6.4–8.2)
RBC # BLD AUTO: 3.97 X10^6/UL (ref 3.5–5.4)
RBC #/AREA URNS HPF: 0 /HPF (ref 0–2)
SODIUM SERPL-SCNC: 137 MMOL/L (ref 136–145)
SP GR UR STRIP: 1.02
SQUAMOUS #/AREA URNS LPF: (no result) /LPF
UROBILINOGEN UR-MCNC: 4 MG/DL
WBC # BLD AUTO: 7.8 X10^3/UL (ref 4–11)
WBC #/AREA URNS HPF: 0 /HPF (ref 0–4)

## 2021-05-31 PROCEDURE — 96374 THER/PROPH/DIAG INJ IV PUSH: CPT

## 2021-05-31 PROCEDURE — 93005 ELECTROCARDIOGRAM TRACING: CPT

## 2021-05-31 PROCEDURE — 84443 ASSAY THYROID STIM HORMONE: CPT

## 2021-05-31 PROCEDURE — 80048 BASIC METABOLIC PNL TOTAL CA: CPT

## 2021-05-31 PROCEDURE — 83605 ASSAY OF LACTIC ACID: CPT

## 2021-05-31 PROCEDURE — 82550 ASSAY OF CK (CPK): CPT

## 2021-05-31 PROCEDURE — 81001 URINALYSIS AUTO W/SCOPE: CPT

## 2021-05-31 PROCEDURE — 36415 COLL VENOUS BLD VENIPUNCTURE: CPT

## 2021-05-31 PROCEDURE — 83690 ASSAY OF LIPASE: CPT

## 2021-05-31 PROCEDURE — 84484 ASSAY OF TROPONIN QUANT: CPT

## 2021-05-31 PROCEDURE — 87086 URINE CULTURE/COLONY COUNT: CPT

## 2021-05-31 PROCEDURE — 96361 HYDRATE IV INFUSION ADD-ON: CPT

## 2021-05-31 PROCEDURE — 87205 SMEAR GRAM STAIN: CPT

## 2021-05-31 PROCEDURE — 83735 ASSAY OF MAGNESIUM: CPT

## 2021-05-31 PROCEDURE — 87040 BLOOD CULTURE FOR BACTERIA: CPT

## 2021-05-31 PROCEDURE — 86140 C-REACTIVE PROTEIN: CPT

## 2021-05-31 PROCEDURE — 99285 EMERGENCY DEPT VISIT HI MDM: CPT

## 2021-05-31 PROCEDURE — 74022 RADEX COMPL AQT ABD SERIES: CPT

## 2021-05-31 PROCEDURE — 80076 HEPATIC FUNCTION PANEL: CPT

## 2021-05-31 PROCEDURE — 84702 CHORIONIC GONADOTROPIN TEST: CPT

## 2021-05-31 PROCEDURE — 85025 COMPLETE CBC W/AUTO DIFF WBC: CPT

## 2021-05-31 PROCEDURE — 51701 INSERT BLADDER CATHETER: CPT

## 2021-05-31 RX ADMIN — SODIUM CHLORIDE, SODIUM LACTATE, POTASSIUM CHLORIDE, AND CALCIUM CHLORIDE SCH MLS/HR: .6; .31; .03; .02 INJECTION, SOLUTION INTRAVENOUS at 21:06

## 2021-05-31 RX ADMIN — KETOROLAC TROMETHAMINE ONE MG: 30 INJECTION, SOLUTION INTRAMUSCULAR at 22:47

## 2021-05-31 NOTE — PHYS DOC
Past History


Past Medical History:  Asthma, GERD, Other


Additional Past Medical Histor:  history of TBI, history of autoimmune disorder.


Past Surgical History:  Tonsillectomy


Additional Past Surgical Histo:  PEG tube


Smoking:  Non-smoker


Alcohol Use:  None


Drug Use:  None





General Adult


HPI:


HPI:


".. She just finished her second course of Zithromax for pneumonia .. and she 

just got her gammaglobulin on Thursday... But she seems to be not doing as well 

and complaining of some back pain.... "The tramadol is not working"...." She is 

locating her pain down in her lower back SI joints..."  " I would like her to 

get a IV steroid injection"... ( Mother)





Patient is a 25 year old female  dependent who presents with above hx 

and complaints low back SI joint pain.  Patient has recently been treated with 

two recent courses of azithromax  for clinical diagnosis of pneumonia.  No 

history of chest x-ray.  No documented fevers recently at home.  Patient has an 

extensive medical history.  Past diagnosis of immunodeficiency syndrome since 

age 6.  Is followed by infectious disease Dr. Bowman. Primary MARGO Soler. Pt. 

Does receive gammaglobulin immunotherapy monthly.  Has a history of traumatic b

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 

dysfunction.  Patient is wheelchair-bound.  Does have a history of neurogenic 

bladder requiring frequent catheterization.  Has a history-Hashimoto 

thyroiditis, adrenal insufficiency wishes supplement with cortisone 

preparations, diagnosis of Humphreys's disease, iron deficiency anemia, B12 

deficiencies, multiple episodes of sepsis, multiple episodes of urinary tract 

infections, GERD, DVTs, kidney stones, bronchial asthma, esophageal strictures 

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  

Patient has previous admissions at [facility] for sepsis.  Patient

has past history of surgery Niesen fundoplication x2, gastrostomy tubes, nerve 

stimulator, Port-A-Cath placement, tonsillectomy, adenoidectomy, EGD'x and 

esophageal stricture dilations.





No recent travel outside cancer area.  No specific ill contacts at home or 

otherwise.  No change in her med routine other than 2 courses of Zithromax for 

suspected pneumonia.  Has been compliant with other meds including meds for her 

Humphreys's.





Review of Systems:


Review of Systems:


Constitutional:  Denies fever or chills 


Eyes:  Denies change in visual acuity 


HENT:  Denies nasal congestion or sore throat 


Respiratory:  Denies cough or shortness of breath 


Cardiovascular:  Denies chest pain or edema 


GI:  Denies abdominal pain, nausea, vomiting, bloody stools or diarrhea 


: Denies dysuria 


Musculoskeletal: Complaints of low back pain which is located at the SI joints .


Integument:  Denies rash 


Neurologic:  Denies headache, focal weakness or sensory changes 


Endocrine:  Denies polyuria or polydipsia 


Lymphatic:  Denies swollen glands 


Psychiatric:  Denies depression or anxiety





Family History:


Family History:


No one else in the home are currently ill.  She has 2 brothers that are healthy.

 Parents are healthy.





Current Medications:


Current Meds:


See nursing for home meds





Allergies:


Allergies:





Allergies








Coded Allergies Type Severity Reaction Last Updated Verified


 


  erythromycin base Allergy Intermediate  18 Yes


 


  I S O L A T I O N *CONTACT* Allergy Unknown  18 Yes











Physical Exam:


PE:





Constitutional:  no acute distress, non-toxic appearance. []


HENT: Normocephalic, atraumatic, bilateral external ears normal, oropharynx 

moist, no oral exudates, nose normal.  Scar


Eyes: PERRLA, EOMI, conjunctiva normal, no discharge. [] 


Neck: Normal range of motion, no tenderness, supple, no stridor. [] 


Cardiovascular:Heart rate regular rhythm, no murmur [].  Monitor shows a sinus 

rhythm 70s


Lungs & Thorax:  Bilateral breath sounds equal apex on auscultation [] patient 

has some basilar crackles with deep clear with deep breaths and positioning


Abdomen: Bowel sounds decreased , mild distention, soft, no tenderness, no 

masses, no pulsatile masses.  PEG


Skin: Warm, dry, no erythema, no rash.  Good turgor


Back: Reports some SI tenderness, no CVA tenderness.  No obvious GQ 5


Extremities: No tenderness, no cyanosis, no clubbing, unable to stand or use 

lower limbs, no edema. [] 


Neurologic: Alert and oriented but very slow to respond to simple questions, 

moves upper extremities on request- limited ROM,moves Rt. upper limb without 

significant difficulty. Lower limbs little volitional movement, decreased lower 

sensory function, no focal deficits noted  From baseline per her mother


Psychologic: Affect flat, judgement unable to do terminate at this time, slow to

 answer simple questions, mood normal. []





EKG:


EKG:


My interpretation of EKG shows sinus rhythm at 75 bpm no acute morphology.  []





Radiology/Procedures:


Radiology/Procedures:


[][facility] [address], [place] [address] [phone]


                                        


                                 IMAGING REPORT





                                     Signed





PATIENT: DENNISE PATE    ACCOUNT: [id number]     MRN#: [id number]


: 1995           LOCATION: ER              AGE: 25


SEX: F                    EXAM DT: 21         ACCESSION#: [id number]


STATUS: REG ER            ORD. PHYSICIAN: JASMIN HUNT MD


REASON: pain


PROCEDURE: ACUTE ABDOMEN SERIES








ACUTE ABDOMEN SERIES





Indication: Abdominal pain





Date of service:2021 .Comparison: CT abdomen and pelvis from 2019





Procedure: PA chest and upright and supine abdomen views are obtained.





Findings: 





Chest:  Cardiac size and pulmonary vessels are normal. Pneumonia, pneumothorax 

or pleural effusion are not present. There is a left-sided Port-A-Cath in place.

 





Abdomen: No evidence of free air is present. Gas pattern is normal . There is 

scattered stool throughout the colon. Neurostimulator device battery pack is 

seen in the right flank





Impression: 





Normal Chest . 


Normal abdomen without obstruction, ileus or free air  .





If indicated, CT scan of the abdomen would be useful for further evaluation.





Electronically signed by: Ye Bruleson MD (2021 9:10 PM) [facility]














DICTATED AND SIGNED BY:     YE BURLESON MD


DATE:     218





CC: JASMIN HUNT MD; SANDIE SOLER DO, MPH ~MTH0 0





Heart Score:


C/O Chest Pain:  N/A


HEART Score for Chest Pain:  








HEART Score for Chest Pain Response (Comments) Value


 


History Slighlty/Non-Suspicious 0


 


ECG Normal 0


 


Age < 45 0


 


Risk Factors                            1 or 2 Risk Factors 1


 


Troponin < Normal Limit 0


 


Total  1








Risk Factors:


Risk Factors:  DM, Current or recent (<one month) smoker, HTN, HLP, family 

history of CAD, obesity.


Risk Scores:


Score 0 - 3:  2.5% MACE over next 6 weeks - Discharge Home


Score 4 - 6:  20.3% MACE over next 6 weeks - Admit for Clinical Observation


Score 7 - 10:  72.7% MACE over next 6 weeks - Early Invasive Strategies





Course & Med Decision Making:


Course & Med Decision Making


Pertinent Labs and Imaging studies reviewed. (See chart for details)





Patient had normal CBC and electrolytes.  Minimal elevation AST 42 ALT 72, 

lactic acid normal.  CRP slightly elevated 24.3.  Urine showed no nitrates or 

leukocytosis.  Chest x-ray was clear.  Normal blood pressures, not tachycardic, 

afebrile.  Chest x-ray is clear.  Blood cultures pending.  


With patient's recent gammaglobulin infusion and compliance with other meds 

would continue to monitor closely.   At this time do not feel loading with 

steroids is beneficial for her SI discomfort in her lower back.  Will treat with

 Toradol.  Patient has close follow-up with her primary care.  Have them review 

ED record.  Consider lidocaine patches for localized discomfort.  If persistent 

pain at SI joint consider localized steroid injections at the SI joints sites.. 

 Findings of sepsis or Humphreys crisis not currently present.  Spinal tap at this

 time to rule out sepsis or meningitis does not appear to be specifically 

indicated.  Will hold loading of antibiotics since she has completed at least 2 

courses of Zithromax for suspected pneumonia.  Have patient return at any time 

if any concerns.  Continuity of care stressed for following patient's symptoms. 

 Keep close follow-up with primary care and her infectious disease Dr. Bowman.





Impression





1.  History of immunodeficiency


2.  History of traumatic brain injury


3.  History of upper and lower neuropathy and motor dysfunction wheelchair-bound


4.  History of Hashimoto's thyroiditis


5.  History of neurogenic bladder


6.  History of iron deficiency anemia- 


7.  History of GERD


8.  Multiple medical issues and sequela


9.  Primary complaint tonight is SI joint pain











[]





Dragon Disclaimer:


Dragon Disclaimer:


This electronic medical record was generated, in whole or in part, using a voice

 recognition dictation system.





Departure


Departure:


Referrals:  


SANDIE SOLER DO, MPH (PCP)





Dragon Disclaimer


This chart was dictated in whole or in part using Voice Recognition software in 

a busy, high-work load, and often noisy Emergency Department environment.  It 

may contain unintended and wholly unrecognized errors or omissions.











JASMIN HUNT MD           May 31, 2021 19:55

## 2021-05-31 NOTE — RAD
ACUTE ABDOMEN SERIES



Indication: Abdominal pain



Date of service:5/31/2021 .Comparison: CT abdomen and pelvis from 6/4/2019



Procedure: PA chest and upright and supine abdomen views are obtained.



Findings: 



Chest:  Cardiac size and pulmonary vessels are normal. Pneumonia, pneumothorax or pleural effusion ar
e not present. There is a left-sided Port-A-Cath in place. 



Abdomen: No evidence of free air is present. Gas pattern is normal . There is scattered stool through
out the colon. Neurostimulator device battery pack is seen in the right flank



Impression: 



Normal Chest . 

Normal abdomen without obstruction, ileus or free air  .



If indicated, CT scan of the abdomen would be useful for further evaluation.



Electronically signed by: Janie Burleson MD (5/31/2021 9:10 PM) [facility]

## 2021-05-31 NOTE — EKG
[facility] [address], [place] [address]

Test Date:    2021               Test Time:    21:06:14

Pat Name:     DENNISE PATE              Department:   

Patient ID:   [id number]           Room:          

Gender:       F                        Technician:   

:          1995               Requested By: JASMIN HUNT

Order Number: [id number]            Reading MD:   Kemar Holt

                                 Measurements

Intervals                              Axis          

Rate:         75                       P:            64

MA:           176                      QRS:          86

QRSD:         68                       T:            47

QT:           380                                    

QTc:          427                                    

                           Interpretive Statements

SINUS RHYTHM

NORMAL ECG

Electronically Signed On 2021 14:55:58 CDT by Kemar Holt

## 2021-06-14 ENCOUNTER — HOSPITAL ENCOUNTER (INPATIENT)
Dept: HOSPITAL 63 - ER | Age: 26
LOS: 2 days | Discharge: ANOTHER HOSPITAL | DRG: 314 | End: 2021-06-16
Attending: HOSPITALIST | Admitting: HOSPITALIST
Payer: OTHER GOVERNMENT

## 2021-06-14 VITALS — DIASTOLIC BLOOD PRESSURE: 83 MMHG | SYSTOLIC BLOOD PRESSURE: 130 MMHG

## 2021-06-14 VITALS — SYSTOLIC BLOOD PRESSURE: 112 MMHG | DIASTOLIC BLOOD PRESSURE: 73 MMHG

## 2021-06-14 VITALS — WEIGHT: 119.93 LBS | BODY MASS INDEX: 18.6 KG/M2 | HEIGHT: 67.5 IN

## 2021-06-14 VITALS — SYSTOLIC BLOOD PRESSURE: 100 MMHG | DIASTOLIC BLOOD PRESSURE: 66 MMHG

## 2021-06-14 DIAGNOSIS — N31.9: ICD-10-CM

## 2021-06-14 DIAGNOSIS — J45.909: ICD-10-CM

## 2021-06-14 DIAGNOSIS — K21.9: ICD-10-CM

## 2021-06-14 DIAGNOSIS — D50.9: ICD-10-CM

## 2021-06-14 DIAGNOSIS — E27.1: ICD-10-CM

## 2021-06-14 DIAGNOSIS — B95.2: ICD-10-CM

## 2021-06-14 DIAGNOSIS — Z87.820: ICD-10-CM

## 2021-06-14 DIAGNOSIS — N39.498: ICD-10-CM

## 2021-06-14 DIAGNOSIS — N39.0: ICD-10-CM

## 2021-06-14 DIAGNOSIS — I33.0: ICD-10-CM
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
## 2021-06-14 DIAGNOSIS — E06.3: ICD-10-CM

## 2021-06-14 DIAGNOSIS — R78.81: ICD-10-CM

## 2021-06-14 DIAGNOSIS — G82.20: ICD-10-CM

## 2021-06-14 DIAGNOSIS — B95.62: ICD-10-CM
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
## 2021-06-14 DIAGNOSIS — Z88.8: ICD-10-CM

## 2021-06-14 DIAGNOSIS — R53.81: ICD-10-CM

## 2021-06-14 DIAGNOSIS — D84.9: ICD-10-CM

## 2021-06-14 DIAGNOSIS — Z99.3: ICD-10-CM

## 2021-06-14 DIAGNOSIS — M35.00: ICD-10-CM

## 2021-06-14 DIAGNOSIS — T80.218A: Primary | ICD-10-CM

## 2021-06-14 DIAGNOSIS — E27.40: ICD-10-CM

## 2021-06-14 LAB
ALBUMIN SERPL-MCNC: 3.4 G/DL (ref 3.4–5)
ALBUMIN/GLOB SERPL: 0.7 {RATIO} (ref 1–1.7)
ALP SERPL-CCNC: 82 U/L (ref 46–116)
ALT SERPL-CCNC: 45 U/L (ref 14–59)
ANION GAP SERPL CALC-SCNC: 12 MMOL/L (ref 6–14)
AST SERPL-CCNC: 18 U/L (ref 15–37)
BASOPHILS # BLD AUTO: 0 X10^3/UL (ref 0–0.2)
BASOPHILS NFR BLD: 1 % (ref 0–3)
BILIRUB SERPL-MCNC: 0.5 MG/DL (ref 0.2–1)
BUN/CREAT SERPL: 10 (ref 6–20)
CA-I SERPL ISE-MCNC: 7 MG/DL (ref 7–20)
CALCIUM SERPL-MCNC: 8.7 MG/DL (ref 8.5–10.1)
CHLORIDE SERPL-SCNC: 104 MMOL/L (ref 98–107)
CO2 SERPL-SCNC: 24 MMOL/L (ref 21–32)
CREAT SERPL-MCNC: 0.7 MG/DL (ref 0.6–1)
EOSINOPHIL NFR BLD: 0 % (ref 0–3)
EOSINOPHIL NFR BLD: 0 X10^3/UL (ref 0–0.7)
ERYTHROCYTE [DISTWIDTH] IN BLOOD BY AUTOMATED COUNT: 12.7 % (ref 11.5–14.5)
GFR SERPLBLD BASED ON 1.73 SQ M-ARVRAT: 102 ML/MIN
GLOBULIN SER-MCNC: 4.6 G/DL (ref 2.2–3.8)
GLUCOSE SERPL-MCNC: 97 MG/DL (ref 70–99)
HCT VFR BLD CALC: 36.7 % (ref 36–47)
HGB BLD-MCNC: 12.8 G/DL (ref 12–15.5)
LYMPHOCYTES # BLD: 1.1 X10^3/UL (ref 1–4.8)
LYMPHOCYTES NFR BLD AUTO: 15 % (ref 24–48)
MCH RBC QN AUTO: 32 PG (ref 25–35)
MCHC RBC AUTO-ENTMCNC: 35 G/DL (ref 31–37)
MCV RBC AUTO: 91 FL (ref 79–100)
MONO #: 0.6 X10^3/UL (ref 0–1.1)
MONOCYTES NFR BLD: 9 % (ref 0–9)
NEUT #: 5.4 X10^3UL (ref 1.8–7.7)
NEUTROPHILS NFR BLD AUTO: 75 % (ref 31–73)
PLATELET # BLD AUTO: 208 X10^3/UL (ref 140–400)
POTASSIUM SERPL-SCNC: 3.5 MMOL/L (ref 3.5–5.1)
PROT SERPL-MCNC: 8 G/DL (ref 6.4–8.2)
RBC # BLD AUTO: 4.04 X10^6/UL (ref 3.5–5.4)
SODIUM SERPL-SCNC: 140 MMOL/L (ref 136–145)
WBC # BLD AUTO: 7.2 X10^3/UL (ref 4–11)

## 2021-06-14 PROCEDURE — 80053 COMPREHEN METABOLIC PANEL: CPT

## 2021-06-14 PROCEDURE — 85025 COMPLETE CBC W/AUTO DIFF WBC: CPT

## 2021-06-14 PROCEDURE — 80202 ASSAY OF VANCOMYCIN: CPT

## 2021-06-14 PROCEDURE — 87040 BLOOD CULTURE FOR BACTERIA: CPT

## 2021-06-14 PROCEDURE — 87186 SC STD MICRODIL/AGAR DIL: CPT

## 2021-06-14 PROCEDURE — 87205 SMEAR GRAM STAIN: CPT

## 2021-06-14 PROCEDURE — 36415 COLL VENOUS BLD VENIPUNCTURE: CPT

## 2021-06-14 PROCEDURE — 87077 CULTURE AEROBIC IDENTIFY: CPT

## 2021-06-14 PROCEDURE — 83605 ASSAY OF LACTIC ACID: CPT

## 2021-06-14 RX ADMIN — METOPROLOL TARTRATE SCH MG: 25 TABLET, FILM COATED ORAL at 21:35

## 2021-06-14 RX ADMIN — VANCOMYCIN HYDROCHLORIDE PRN EACH: 1 INJECTION, POWDER, LYOPHILIZED, FOR SOLUTION INTRAVENOUS at 15:56

## 2021-06-14 RX ADMIN — HYDROXYCHLOROQUINE SULFATE SCH MG: 200 TABLET, FILM COATED ORAL at 20:17

## 2021-06-14 RX ADMIN — ONDANSETRON PRN MG: 4 TABLET, ORALLY DISINTEGRATING ORAL at 20:17

## 2021-06-14 RX ADMIN — HYDROCORTISONE SCH MG: 10 TABLET ORAL at 20:16

## 2021-06-14 RX ADMIN — MORPHINE SULFATE PRN MG: 4 INJECTION, SOLUTION INTRAMUSCULAR; INTRAVENOUS at 16:11

## 2021-06-14 RX ADMIN — SODIUM CHLORIDE SCH MLS/HR: 0.9 INJECTION, SOLUTION INTRAVENOUS at 17:25

## 2021-06-14 RX ADMIN — ACETAMINOPHEN PRN MG: 500 TABLET ORAL at 17:25

## 2021-06-14 RX ADMIN — VANCOMYCIN HYDROCHLORIDE SCH MLS/HR: 750 INJECTION, POWDER, LYOPHILIZED, FOR SOLUTION INTRAVENOUS at 21:34

## 2021-06-14 RX ADMIN — MORPHINE SULFATE PRN MG: 4 INJECTION, SOLUTION INTRAMUSCULAR; INTRAVENOUS at 20:17

## 2021-06-14 NOTE — HP
ADMIT DATE: 06/14/2021

ATTENDING PHYSICIAN:  Dr. Mejia.



CHIEF COMPLAINT:  Positive blood culture.



HISTORY OF PRESENT ILLNESS:  The patient is a 25-year-old female with a very 

interesting past medical history with autoimmune issues as well as a traumatic 

brain injury at age 14.  She is a paraplegic.  She is cared for very well by her

mom at home.  She has multiple endocrine issues including hypothyroidism, 

adrenal insufficiency, Sjogren syndrome.  The patient supposedly was in the ER. 

On 05/31, she had blood cultures drawn, one of them came back positive for 

methicillin Staphylococcus aureus.  They were never notified.  She has not been 

on any treatment and therefore with a positive cultures, this was sent to Dr. Bowman, Infectious Disease Specialist _____.  Dr. Bowman then called the family to

bring her in for admission and initiation of intravenous vancomycin.  When I saw

her, she did not appear toxic.  She is at her baseline, very minimal contact.  

She has a blank stare.  Her mom gave most of the history.  She has not had 

fevers per se.  The highest temperature recorded was 100.0 degrees Fahrenheit.  

No rigors or chills.  The patient was therefore admitted with a diagnosis of 

bacteremia and early systemic inflammatory response syndrome.  She is 

tachycardic when I saw her at rest with a rate of 120.  She does not appear 

hemodynamic compromise.  Her skin is warm and dry.



PAST MEDICAL HISTORY:  Very interesting.  She has a previous history of 

traumatic brain injury at age 14, falling off of a trampoline of sorts. 

Resultantly, she has a significant TBI issues, decreased mentation.  She is a 

paraplegic.  She has a neurogenic bladder.  She also has chronic headaches; 

hypothyroidism, on replacement.  She has had Hashimoto's thyroiditis.  She also 

has Sjogren syndrome.  She also has adrenal insufficiency.  She is currently 

taking Synthroid replacement hormones, Florinef and Cortef.  She also takes 

Plaquenil for her Sjogren's syndrome.



SOCIAL HISTORY:  She is a nonsmoker, nondrinker.



ALLERGIES:  Include _____ and ERYTHROMYCIN BASE.  She has in the past red man 

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



CURRENT MEDICATIONS:  Include Ventolin inhaler, Augmentin _____, budesonide, 

Nexium, _____ for pain, Levaquin, Linzess, magnesium citrate, Medrol, 

metoclopramide, metoprolol, ondansetron, promethazine, senna, Carafate, 

tramadol, Synthroid and Cortef 5 mg 3 times a day.  She does see an 

endocrinologist at [facility].



FAMILY HISTORY:  She lives with her mom.  She has two brothers or siblings, they

are healthy.



REVIEW OF SYSTEMS:  All other systems reviewed and turned to be negative.



PHYSICAL EXAMINATION:

GENERAL:  When I saw her, this is an alert, young female who is minimally 

talkative.

VITAL SIGNS:  Initial vital signs showed a temperature 99.0 degrees Fahrenheit, 

BP was 122/78, pulse 110 and regular, oxygen saturation 98% on room air.

HEENT:  Head is without trauma.  Pupils are reactive.  Sclerae is nonicteric.  

Oropharynx is clear.

NECK:  Supple.

LUNGS:  Clear to auscultation.

CARDIOVASCULAR:  Regular heart tones.  No gallops.

ABDOMEN:  Soft.  No guarding.  Previous scars are well healed.

EXTREMITIES:  Showed no cyanosis.

NEUROLOGIC:  She is paraplegic in the lower extremities.  She is nonambulatory.



LABORATORY STUDIES:  Hemoglobin is 12.8 grams, white count 7200.  Chemistries 

are pending at this time.  Microbiology, specimens dated 05/31/2021 grew out 

gram-positive cocci.  It is identified as MRSA along with Enterococcus faecalis.

 The sensitivities have been reviewed.  She is sensitive to vancomycin, Zyvox 

and Cubicin.



ASSESSMENT:

1.  This 25-year-old female has a positive blood culture 2 weeks ago.  She has 

subclinical symptoms.  She is admitted then for further treatment and 

evaluation.

2.  History of traumatic brain injury with associated paraplegia.

3.  Urinary incontinence with neurogenic bladder.

4.  History of Hashimoto thyroiditis, on thyroid replacement.

5.  History of adrenal insufficiency, on supplemental Florinef as well as 

Cortef.

6.  Generalized debilitation.



PLAN:

1.  Admit to the inpatient unit.

2.  Intravenous vancomycin per pharmacy.

3.  Continue home meds.

4.  We shall pretreat her with Benadryl prior to vancomycin.

5.  We should try to contact Dr. Bowman for recommendations as the duration of 

treatment.







TYLER/KHANH

DR: CAMRON/clare   DD: 06/14/2021 14:36

DT: 06/14/2021 15:52   TID: [id number]

CC:     MADISON MACKAY MD

## 2021-06-14 NOTE — PHYS DOC
Past History


Past Medical History:  Asthma, GERD, Other


Additional Past Medical Histor:  TBI, history of autoimmune d/o,


Past Surgical History:  Cholecystectomy, Tonsillectomy


Additional Past Surgical Histo:  PEG tube, fundoplicationx3,neurostimulator,port


Smoking:  Non-smoker


Alcohol Use:  None


Drug Use:  None





Adult General


Chief Complaint


Chief Complaint:  ABNORMAL LABS





[facility]


Patient is a 25-year-old female who presents to the emergency room returning 

after being instructed to come back by infectious disease.  Patient was seen 

here at the end of May and at that time was diagnosed with lower back pain with 

unknown cause.  She had blood cultures drawn at that time which later came back 

as positive.  She was called back today for positive blood cultures that are 

growing MRSA.  Patient has been having diffuse body pain.  She has been having 

low-grade fevers.  Mom states she often does not have fevers.  She states that 

she otherwise has been doing fine.





Review of Systems


Review of Systems


Complete ROS is negative unless otherwise documented in HPI





Current Medications


Current Medications





Current Medications








 Medications


  (Trade)  Dose


 Ordered  Sig/Folyd  Start Time


 Stop Time Status Last Admin


Dose Admin


 


 Diphenhydramine


 HCl


  (Benadryl)  50 mg  STK-MED ONCE  6/14/21 14:11


 6/14/21 14:11 DC  





 


 Sodium Chloride  1,000 ml @ 


 1,000 mls/hr  1X  ONCE  6/14/21 13:15


 6/14/21 14:14   





 


 Vancomycin HCl


 1.25 gm/Sodium


 Chloride  500 ml @ 


 250 mls/hr  1X  ONCE  6/14/21 13:15


 6/14/21 15:14   














Allergies


Allergies





Allergies








Coded Allergies Type Severity Reaction Last Updated Verified


 


  erythromycin base Allergy Intermediate  6/14/21 Yes


 


  I S O L A T I O N *CONTACT* Allergy Unknown  5/31/21 Yes











Physical Exam


Physical Exam


General: Awake, alert, NAD. Well Nourished, well hydrated. Cooperative


HEENT: Atraumatic, EOMI, PERRL, airway patent, moist oral mucosa


Neck: Supple, trachea midline


Respiratory: CTA bilaterally, normal effort, no wheezing/crackles


CV: Tachycardia, no murmur, cap refill <2


GI: Soft, nondistended, nontender, no masses, G-tube


MSK: No obvious deformities


Skin: Warm, dry, intact


Neuro: A&O x3, speech NL, sensory and motor grossly intact, no focal deficits


Psych: Normal affect, normal mood, not suicidal or homicidal





Current Patient Data


Vital Signs





                                   Vital Signs








  Date Time  Temp Pulse Resp B/P (MAP) Pulse Ox O2 Delivery O2 Flow Rate FiO2


 


6/14/21 12:44 99.0 114 20 122/78 (93) 98 Room Air  











EKG


EKG


[]





Radiology/Procedures


Radiology/Procedures


[]





Heart Score


C/O Chest Pain:  N/A


Risk Factors:


Risk Factors:  DM, Current or recent (<one month) smoker, HTN, HLP, family 

history of CAD, obesity.


Risk Scores:


Risk Factors:  DM, Current or recent (<one month) smoker, HTN, HLP, family 

history of CAD, obesity.





Course & Med Decision Making


Course & Med Decision Making


Pertinent Labs and Imaging studies reviewed. (See chart for details)





Patient is 25-year-old female presents to the emergency room with known 

bacteremia.  Sepsis work-up was ordered.  Patient is tachycardic.  She will be 

placed on fluids and antibiotics.  Patient will be put on vancomycin given her 

sensitivities.  She is otherwise stable.  Patient discussed with Dr. Mejia who will

 admit the patient for further care and evaluation.





Dragon Disclaimer


Dragon Disclaimer


This electronic medical record was generated, in whole or in part, using a voice

 recognition dictation system.





Departure


Departure:


Impression:  


   Primary Impression:  


   Immune deficiency disorder


   Additional Impression:  


   Bacteremia due to methicillin resistant Staphylococcus aureus


Disposition:  09 ADMITTED AS INPATIENT


Condition:  STABLE


Referrals:  


SANDIE PLUNKETT DO, MPH (PCP)





Problem Qualifiers











MAGDALENE DEL ROSARIO MD            Jun 14, 2021 14:20

## 2021-06-15 VITALS — DIASTOLIC BLOOD PRESSURE: 55 MMHG | SYSTOLIC BLOOD PRESSURE: 93 MMHG

## 2021-06-15 VITALS — DIASTOLIC BLOOD PRESSURE: 55 MMHG | SYSTOLIC BLOOD PRESSURE: 91 MMHG

## 2021-06-15 VITALS — DIASTOLIC BLOOD PRESSURE: 59 MMHG | SYSTOLIC BLOOD PRESSURE: 95 MMHG

## 2021-06-15 LAB — VANC TR: 9.1 MCG/ML (ref 10–20)

## 2021-06-15 RX ADMIN — MORPHINE SULFATE PRN MG: 4 INJECTION, SOLUTION INTRAMUSCULAR; INTRAVENOUS at 05:42

## 2021-06-15 RX ADMIN — HYDROCORTISONE SCH MG: 10 TABLET ORAL at 14:33

## 2021-06-15 RX ADMIN — ACETAMINOPHEN PRN MG: 500 TABLET ORAL at 14:34

## 2021-06-15 RX ADMIN — HYDROXYCHLOROQUINE SULFATE SCH MG: 200 TABLET, FILM COATED ORAL at 07:48

## 2021-06-15 RX ADMIN — MORPHINE SULFATE PRN MG: 4 INJECTION, SOLUTION INTRAMUSCULAR; INTRAVENOUS at 23:49

## 2021-06-15 RX ADMIN — MORPHINE SULFATE PRN MG: 4 INJECTION, SOLUTION INTRAMUSCULAR; INTRAVENOUS at 00:10

## 2021-06-15 RX ADMIN — FLUDROCORTISONE ACETATE SCH MG: 0.1 TABLET ORAL at 07:48

## 2021-06-15 RX ADMIN — Medication SCH CAP: at 21:01

## 2021-06-15 RX ADMIN — SODIUM CHLORIDE SCH MLS/HR: 0.9 INJECTION, SOLUTION INTRAVENOUS at 05:43

## 2021-06-15 RX ADMIN — MORPHINE SULFATE PRN MG: 4 INJECTION, SOLUTION INTRAMUSCULAR; INTRAVENOUS at 09:50

## 2021-06-15 RX ADMIN — PANTOPRAZOLE SODIUM SCH MG: 40 TABLET, DELAYED RELEASE ORAL at 07:46

## 2021-06-15 RX ADMIN — SODIUM CHLORIDE SCH MLS/HR: 0.9 INJECTION, SOLUTION INTRAVENOUS at 19:46

## 2021-06-15 RX ADMIN — METOPROLOL TARTRATE SCH MG: 25 TABLET, FILM COATED ORAL at 07:47

## 2021-06-15 RX ADMIN — MORPHINE SULFATE PRN MG: 4 INJECTION, SOLUTION INTRAMUSCULAR; INTRAVENOUS at 14:40

## 2021-06-15 RX ADMIN — HYDROCORTISONE SODIUM SUCCINATE SCH MG: 100 INJECTION, POWDER, FOR SOLUTION INTRAMUSCULAR; INTRAVENOUS at 09:26

## 2021-06-15 RX ADMIN — HYDROCORTISONE SCH MG: 10 TABLET ORAL at 07:48

## 2021-06-15 RX ADMIN — FLUDROCORTISONE ACETATE SCH MG: 0.1 TABLET ORAL at 12:18

## 2021-06-15 RX ADMIN — METOPROLOL TARTRATE SCH MG: 25 TABLET, FILM COATED ORAL at 21:00

## 2021-06-15 RX ADMIN — ACETAMINOPHEN PRN MG: 500 TABLET ORAL at 07:46

## 2021-06-15 RX ADMIN — VANCOMYCIN HYDROCHLORIDE SCH MLS/HR: 750 INJECTION, POWDER, LYOPHILIZED, FOR SOLUTION INTRAVENOUS at 05:41

## 2021-06-15 RX ADMIN — LEVOTHYROXINE SODIUM SCH MCG: 50 TABLET ORAL at 05:42

## 2021-06-15 RX ADMIN — VANCOMYCIN HYDROCHLORIDE SCH MLS/HR: 1 INJECTION, POWDER, LYOPHILIZED, FOR SOLUTION INTRAVENOUS at 21:55

## 2021-06-15 RX ADMIN — VANCOMYCIN HYDROCHLORIDE SCH MLS/HR: 1 INJECTION, POWDER, LYOPHILIZED, FOR SOLUTION INTRAVENOUS at 14:33

## 2021-06-15 RX ADMIN — VANCOMYCIN HYDROCHLORIDE PRN EACH: 1 INJECTION, POWDER, LYOPHILIZED, FOR SOLUTION INTRAVENOUS at 14:36

## 2021-06-15 RX ADMIN — HYDROXYCHLOROQUINE SULFATE SCH MG: 200 TABLET, FILM COATED ORAL at 21:00

## 2021-06-15 RX ADMIN — MORPHINE SULFATE PRN MG: 4 INJECTION, SOLUTION INTRAMUSCULAR; INTRAVENOUS at 19:42

## 2021-06-15 RX ADMIN — HYDROCORTISONE SCH MG: 10 TABLET ORAL at 21:01

## 2021-06-15 RX ADMIN — ACETAMINOPHEN PRN MG: 500 TABLET ORAL at 00:06

## 2021-06-15 RX ADMIN — ONDANSETRON PRN MG: 4 TABLET, ORALLY DISINTEGRATING ORAL at 19:42

## 2021-06-15 RX ADMIN — ONDANSETRON PRN MG: 4 TABLET, ORALLY DISINTEGRATING ORAL at 05:42

## 2021-06-15 RX ADMIN — ACETAMINOPHEN PRN MG: 500 TABLET ORAL at 21:01

## 2021-06-15 NOTE — PN
DATE: 06/15/2021

ATTENDING PHYSICIAN:  Dr. Mejia.



SUBJECTIVE:  The patient is a little more alert.  She is still weak and tired.  

Her blood pressure was marginal at 98 mmHg.  She did get some fluids.  

Tachycardia has improved.



OBJECTIVE:

VITAL SIGNS:  Blood pressure this morning is 95/59, pulse is down to 84 and 

regular, temperature 99.0 degrees Fahrenheit, oxygen saturation 98% on room air.

HEENT:  Head is without trauma.  Pupils are reactive.  Sclerae is nonicteric.  

The oropharynx is clear.

NECK:  Supple.

LUNGS:  Clear.

CARDIOVASCULAR:  Showed distant heart tones.  No gallops.  Peripheral pulses are

palpable and full.

ABDOMEN:  Soft, scaphoid, nontender.  Hypoactive bowel sounds.

EXTREMITIES:  Show no cyanosis.  Chronic catheter is in place.  Urine is 

somewhat concentrated.

SKIN:  Warm and dry.



ASSESSMENT:

1.  A 25-year-old female with Staphylococcus aureus bacteremia.

2.  Traumatic brain injury with associated paraplegia.

3.  Urinary incontinence with neurogenic bladder.

4.  History of Hashimoto's thyroiditis, on replacement.

5.  History of adrenal insufficiency.  She may be having symptoms related to 

addisonian crisis, requiring higher doses of maintenance corticosteroids.



PLAN:

1.  Continue antibiotics, vancomycin as per pharmacy.

2.  Stress dose Solu-Cortef 50 mg IV daily ordered.

3.  Continue gentle IV hydration.

4.  We will proceed with Benadryl prior to administration of vancomycin due to 

previous history of red man syndrome.

5.  Dr. Mackay knows the case.  He will take over the care tomorrow.  At that 

time, he can consult with Dr. Bowman at [place] as to duration of therapy.  

The family wants to go home and do outpatient IV vancomycin.  She does have a 

port already.







CAMRON/CHAYO

DR: CAMRON/clare   DD: 06/15/2021 09:26

DT: 06/15/2021 20:05   TID: [id number]

CC:     MADISON MACKAY MD

## 2021-06-16 VITALS — DIASTOLIC BLOOD PRESSURE: 77 MMHG | SYSTOLIC BLOOD PRESSURE: 119 MMHG

## 2021-06-16 VITALS — SYSTOLIC BLOOD PRESSURE: 94 MMHG | DIASTOLIC BLOOD PRESSURE: 64 MMHG

## 2021-06-16 LAB
ALBUMIN SERPL-MCNC: 2.2 G/DL (ref 3.4–5)
ALBUMIN/GLOB SERPL: 0.6 {RATIO} (ref 1–1.7)
ALP SERPL-CCNC: 63 U/L (ref 46–116)
ALT SERPL-CCNC: 75 U/L (ref 14–59)
ANION GAP SERPL CALC-SCNC: 8 MMOL/L (ref 6–14)
AST SERPL-CCNC: 29 U/L (ref 15–37)
BASOPHILS # BLD AUTO: 0 X10^3/UL (ref 0–0.2)
BASOPHILS NFR BLD: 0 % (ref 0–3)
BILIRUB SERPL-MCNC: 0.3 MG/DL (ref 0.2–1)
BUN/CREAT SERPL: 12 (ref 6–20)
CA-I SERPL ISE-MCNC: 6 MG/DL (ref 7–20)
CALCIUM SERPL-MCNC: 7.9 MG/DL (ref 8.5–10.1)
CHLORIDE SERPL-SCNC: 111 MMOL/L (ref 98–107)
CO2 SERPL-SCNC: 25 MMOL/L (ref 21–32)
CREAT SERPL-MCNC: 0.5 MG/DL (ref 0.6–1)
EOSINOPHIL NFR BLD: 0 X10^3/UL (ref 0–0.7)
EOSINOPHIL NFR BLD: 1 % (ref 0–3)
ERYTHROCYTE [DISTWIDTH] IN BLOOD BY AUTOMATED COUNT: 12.4 % (ref 11.5–14.5)
GFR SERPLBLD BASED ON 1.73 SQ M-ARVRAT: 150.3 ML/MIN
GLOBULIN SER-MCNC: 3.4 G/DL (ref 2.2–3.8)
GLUCOSE SERPL-MCNC: 73 MG/DL (ref 70–99)
HCT VFR BLD CALC: 28.9 % (ref 36–47)
HGB BLD-MCNC: 9.9 G/DL (ref 12–15.5)
LYMPHOCYTES # BLD: 1.4 X10^3/UL (ref 1–4.8)
LYMPHOCYTES NFR BLD AUTO: 25 % (ref 24–48)
MCH RBC QN AUTO: 32 PG (ref 25–35)
MCHC RBC AUTO-ENTMCNC: 34 G/DL (ref 31–37)
MCV RBC AUTO: 92 FL (ref 79–100)
MONO #: 0.6 X10^3/UL (ref 0–1.1)
MONOCYTES NFR BLD: 11 % (ref 0–9)
NEUT #: 3.6 X10^3UL (ref 1.8–7.7)
NEUTROPHILS NFR BLD AUTO: 63 % (ref 31–73)
PLATELET # BLD AUTO: 147 X10^3/UL (ref 140–400)
POTASSIUM SERPL-SCNC: 3.3 MMOL/L (ref 3.5–5.1)
PROT SERPL-MCNC: 5.6 G/DL (ref 6.4–8.2)
RBC # BLD AUTO: 3.14 X10^6/UL (ref 3.5–5.4)
SODIUM SERPL-SCNC: 144 MMOL/L (ref 136–145)
WBC # BLD AUTO: 5.7 X10^3/UL (ref 4–11)

## 2021-06-16 RX ADMIN — SODIUM CHLORIDE SCH MLS/HR: 0.9 INJECTION, SOLUTION INTRAVENOUS at 09:08

## 2021-06-16 RX ADMIN — MORPHINE SULFATE PRN MG: 4 INJECTION, SOLUTION INTRAMUSCULAR; INTRAVENOUS at 05:40

## 2021-06-16 RX ADMIN — MORPHINE SULFATE PRN MG: 4 INJECTION, SOLUTION INTRAMUSCULAR; INTRAVENOUS at 14:28

## 2021-06-16 RX ADMIN — VANCOMYCIN HYDROCHLORIDE SCH MLS/HR: 1 INJECTION, POWDER, LYOPHILIZED, FOR SOLUTION INTRAVENOUS at 05:40

## 2021-06-16 RX ADMIN — VANCOMYCIN HYDROCHLORIDE SCH MLS/HR: 1 INJECTION, POWDER, LYOPHILIZED, FOR SOLUTION INTRAVENOUS at 13:38

## 2021-06-16 RX ADMIN — ONDANSETRON PRN MG: 4 TABLET, ORALLY DISINTEGRATING ORAL at 14:28

## 2021-06-16 RX ADMIN — FLUDROCORTISONE ACETATE SCH MG: 0.1 TABLET ORAL at 12:21

## 2021-06-16 RX ADMIN — LEVOTHYROXINE SODIUM SCH MCG: 50 TABLET ORAL at 05:40

## 2021-06-16 RX ADMIN — MORPHINE SULFATE PRN MG: 4 INJECTION, SOLUTION INTRAMUSCULAR; INTRAVENOUS at 09:49

## 2021-06-16 RX ADMIN — Medication SCH CAP: at 08:50

## 2021-06-16 RX ADMIN — HYDROCORTISONE SODIUM SUCCINATE SCH MG: 100 INJECTION, POWDER, FOR SOLUTION INTRAMUSCULAR; INTRAVENOUS at 08:50

## 2021-06-16 RX ADMIN — ONDANSETRON PRN MG: 4 TABLET, ORALLY DISINTEGRATING ORAL at 05:40

## 2021-06-16 RX ADMIN — METOPROLOL TARTRATE SCH MG: 25 TABLET, FILM COATED ORAL at 09:00

## 2021-06-16 RX ADMIN — HYDROCORTISONE SCH MG: 10 TABLET ORAL at 13:38

## 2021-06-16 RX ADMIN — HYDROXYCHLOROQUINE SULFATE SCH MG: 200 TABLET, FILM COATED ORAL at 08:51

## 2021-06-16 RX ADMIN — HYDROCORTISONE SCH MG: 10 TABLET ORAL at 08:52

## 2021-06-16 RX ADMIN — PANTOPRAZOLE SODIUM SCH MG: 40 TABLET, DELAYED RELEASE ORAL at 08:51

## 2021-06-16 RX ADMIN — ACETAMINOPHEN PRN MG: 500 TABLET ORAL at 15:26

## 2021-06-16 NOTE — DS
DATE OF DISCHARGE: 06/16/2021

HOSPITAL COURSE:  The patient is a 25-year-old  female patient who 

apparently was treated at urgent care on 05/17/2021 for a community-acquired 

pneumonia.  At that time, she was given a course of Zithromax and a week later, 

she also continued to have a fever and was treated with another course of 

Zithromax.  She apparently presented on 05/31 to the Emergency Room of [facility].  At that time, she apparently had just finished her second 

course of Zithromax for pneumonia and she just got her gammaglobulin, but she 

seems to be not doing as well as she continued to complaining of her back pain 

and the tramadol is not working.  She is repeating her pain is down in her lower

back at the sacroiliac joint and her mother wanted her to have a steroid 

injection.  She apparently is known to have immune deficiency syndrome since the

age of 6 and is followed by infectious disease consultant, Dr. Bowman and primary

care Dr. Saleh.  She does receive gammaglobulin immunotherapy monthly.  

She has a history of traumatic brain injury, history of left upper and bilateral

lower extremity neuropathy with motor dysfunction.  She is usually wheelchair 

bound, does have a history of neurogenic bladder requiring frequent 

catheterization, has a history of Hashimoto's thyroiditis, adrenal insufficiency

and iron deficiency anemia, B12 deficiency anemia, multiple episodes of sepsis, 

multiple episodes of urinary tract infection.  She was basically investigated 

and has had urine and blood culture and the patient was basically discharged 

home.  Apparently, her blood and urine culture were drawn and they held the 

antibiotic as she has completed at least 2 courses of Zithromax for suspected 

pneumonia and was asked to return at any time if there is any concern and 

apparently on _____ 05/14, they were called by the infectious disease specialist

that her blood and urine culture were positive and in fact her blood culture has

grown methicillin-resistant Staphylococcus aureus and her urine culture has 

grown Enterobacter aerogenes.  She was started on IV vancomycin; however, she 

has not received any treatment for Enterobacter aerogenes and I just finished 

talking with the Dr. Bowman and the plan was to start her on meropenem and will 

transfer the patient to [facility] to remove the Port-A-Cath and 

to arrange for a transthoracic echocardiogram and to consult the surgical team 

and the Cardiology for evaluation and treatment.



PHYSICAL EXAMINATION:

GENERAL:  When I saw her today, she looked well and was clearly in no apparent 

respiratory distress.  She was pale.  Not jaundiced, cyanosed.  No 

lymphadenopathy, no thyromegaly, no jugular venous distention.  No limb edema.

VITAL SIGNS:  Her heart rate was 78, blood pressure is 94/64, temperature was 

98.5, respiratory rate was 16 and oxygen saturation was 99% on room air.

HEAD, EYES, EARS, NOSE AND THROAT:  Normocephalic, atraumatic.

NECK:  Supple.

HEART:  Normal first and second heart sounds.  No gallop or murmur.

CHEST:  Clear to auscultation, no crepitation or rhonchi.

ABDOMEN:  Distended, soft, nontender.

NEUROLOGIC:  She has paraplegia.  She is mostly wheelchair bound.  She has a 

neurogenic bladder requiring indwelling Carr catheter.



LABORATORY DATA:  Her lab work showed a white cell count 5700, hemoglobin 10, 

hematocrit 29, MCV 92 and platelet count of 147,000.  Her chemistry showed a 

serum sodium 144, potassium 3.3, chloride 111, bicarbonate 25, anion gap 8, BUN 

6, creatinine 0.5.  Estimated GFR was 150 mL per minute.  Her glucose was 73, 

calcium was 7.9.  Total bilirubin, AST, ALT, alkaline phosphatase were normal.  

Total protein 5.6, albumin was 2.2.  Her blood culture has grown gram-positive 

cocci in cluster suggestive of Staph in 1/4 bottles, 2 sets were drawn.



DISCHARGE MEDICATIONS:  She will be discharged or transferred to [facility] on lactobacillus rhamnosus 1 capsule twice a day, vancomycin 1.25

grams IV q. 8 hourly, fludrocortisone 100 mcg once a day, hydrocortisone sodium 

succinate 50 mg IV daily, Protonix 40 mg daily, levothyroxine sodium 50 mcg once

a day, diphenhydramine 25 mg IV q. 8 hourly, metoprolol tartrate 25 mg p.o. 

b.i.d., hydroxychloroquine 200 mg twice a day, hydrocortisone 5 mg 3 times a 

day, promethazine 25 mg every 6 hours, ondansetron 4 mg IV every 8 hours, normal

saline at 75 mL per hour, acetaminophen 1000 mg every 6 hours, morphine sulfate 

4 mg IV every 4 hours and meropenem 1 gram IV q. 8 hourly.



FINAL DISCHARGE DIAGNOSES:  Methicillin-resistant Staphylococcus aureus and 

Enterococcus faecalis bacteremia with suspicion for infective endocarditis that 

might be related to infection of her Port-A-Cath, Enterobacter aerogenes urinary

tract infection, traumatic brain injury, paraplegia, immune deficiency syndrome,

hypothyroidism, [place]'s disease.







AMM/ASHTYN/KHANH

DR: AMM/nts   DD: 06/16/2021 13:41

DT: 06/16/2021 16:02   TID: [id number]

## 2021-07-20 ENCOUNTER — HOSPITAL ENCOUNTER (OUTPATIENT)
Dept: HOSPITAL 61 - SURG | Age: 26
Discharge: HOME | End: 2021-07-20
Attending: SURGERY
Payer: OTHER GOVERNMENT

## 2021-07-20 VITALS — BODY MASS INDEX: 16.76 KG/M2 | HEIGHT: 67.5 IN | WEIGHT: 108.03 LBS

## 2021-07-20 VITALS — SYSTOLIC BLOOD PRESSURE: 115 MMHG | DIASTOLIC BLOOD PRESSURE: 61 MMHG

## 2021-07-20 VITALS — SYSTOLIC BLOOD PRESSURE: 111 MMHG | DIASTOLIC BLOOD PRESSURE: 60 MMHG

## 2021-07-20 DIAGNOSIS — Z79.899: ICD-10-CM

## 2021-07-20 DIAGNOSIS — D84.9: ICD-10-CM

## 2021-07-20 DIAGNOSIS — Z88.1: ICD-10-CM

## 2021-07-20 DIAGNOSIS — Z20.822: ICD-10-CM

## 2021-07-20 DIAGNOSIS — Z87.440: ICD-10-CM

## 2021-07-20 DIAGNOSIS — J45.909: ICD-10-CM

## 2021-07-20 DIAGNOSIS — K21.9: ICD-10-CM

## 2021-07-20 DIAGNOSIS — Z98.890: ICD-10-CM

## 2021-07-20 DIAGNOSIS — Z45.2: Primary | ICD-10-CM

## 2021-07-20 PROCEDURE — A4657 SYRINGE W/WO NEEDLE: HCPCS

## 2021-07-20 PROCEDURE — C1788 PORT, INDWELLING, IMP: HCPCS

## 2021-07-20 PROCEDURE — 76937 US GUIDE VASCULAR ACCESS: CPT

## 2021-07-20 PROCEDURE — A4930 STERILE, GLOVES PER PAIR: HCPCS

## 2021-07-20 PROCEDURE — 77001 FLUOROGUIDE FOR VEIN DEVICE: CPT

## 2021-07-20 PROCEDURE — 76000 FLUOROSCOPY <1 HR PHYS/QHP: CPT

## 2021-07-20 PROCEDURE — A4364 ADHESIVE, LIQUID OR EQUAL: HCPCS

## 2021-07-20 PROCEDURE — 87426 SARSCOV CORONAVIRUS AG IA: CPT

## 2021-07-20 PROCEDURE — 36561 INSERT TUNNELED CV CATH: CPT

## 2021-07-20 PROCEDURE — 84702 CHORIONIC GONADOTROPIN TEST: CPT

## 2021-07-20 PROCEDURE — A6258 TRANSPARENT FILM >16<=48 IN: HCPCS

## 2021-07-20 PROCEDURE — A6254 ABSORPT DRG <=16 SQ IN W/BDR: HCPCS

## 2021-07-20 PROCEDURE — 71045 X-RAY EXAM CHEST 1 VIEW: CPT

## 2021-07-20 PROCEDURE — A4452 WATERPROOF TAPE: HCPCS

## 2021-07-20 PROCEDURE — 36415 COLL VENOUS BLD VENIPUNCTURE: CPT

## 2021-07-20 RX ADMIN — FENTANYL CITRATE PRN MCG: 50 INJECTION INTRAMUSCULAR; INTRAVENOUS at 08:49

## 2021-07-20 RX ADMIN — FENTANYL CITRATE PRN MCG: 50 INJECTION INTRAMUSCULAR; INTRAVENOUS at 08:59

## 2021-07-20 NOTE — PDOC4
Operative Note


Operative Note


Operative Note:





Preoperative Diagnosis: Immune deficiency





Postoperative Diagnosis: Same





Procedure: Placement of Power Port-A-Cath using SonoSite guidance





Surgeon: Bebeto





Assistant: Remi LARSON





Anesthesia: Gen.





EBL:  10 mL





Specimen: None





Drains: None





Complications: None





Indication: The patient is a 25-year-old female with a diagnosis of immune 

deficiency requiring chronic central venous access.  A request was made for josefina

cement of a Port-A-Cath to allow for ongoing treatment. The details and risks of

the procedure were discussed. The risks include bleeding, infection, vessel 

injury, pneumothorax, pain, anesthetic risk, port, catheter or tubing 

malfunction or dysfunction, potential need for additional surgery or procedure. 

The patient understands and would like to proceed.





Description:  The patient was placed supine on the operating table and general 

anesthesia was performed. The bilateral neck and chest were prepped with 

ChloraPrep and draped in a standard surgical manner.  With SonoSite ultrasound 

guidance the right internal jugular vein was readily identified and appeared 

patent. Entry was made into the vein with the skinny introducer needle under 

ultrasound guidance. The skinny guidewire passed readily into the central venous

system. A small incision was made at the skin exit site.  The skinny sheath was 

then placed over the guidewire. The larger guidewire was then placed within the 

sheath into the central venous system. Intraoperative fluoroscopy confirmed good

position of the guidewire in the central venous system. The dilator and sheath 

were then placed over the guidewire. The catheter portion was then inserted into

the central venous system and visualized using fluoroscopy. A separate right 

upper chest skin incision was made with a scalpel. A subcutaneous pocket was 

developed with cautery of sufficient size to accommodate the port. The catheter 

was then tunneled subcutaneously to the level of the newly formed pocket. Using 

fluoroscopy the catheter was positioned with the tip in the distal superior vena

cava.  The catheter was then cut and assembled to the port. The port was then 

secured to the chest wall with two 2-0 Prolene sutures.  Using the Larkin needle 

the port readily aspirated and flushed without difficulty. Fluoroscopy confirmed

good positioning of the catheter with no twists or kinks.  The subcutaneous 

tissue was approximated with 3-0 Vicryl. The skin was then closed with 4-0 

Monocryl. A sterile OpSite dressing was then applied. The patient tolerated the 

procedure well and was sent to the recovery room in stable condition. At the end

of the case all counts were correct.











JAYA JAIME MD             Jul 20, 2021 08:37

## 2021-07-20 NOTE — DISCH
DISCHARGE INSTRUCTIONS


Condition on Discharge


Condition on Discharge:  Stable





Activity After Discharge


Activity Instructions for Disc:  Activity as tolerated





Diet after Discharge


Diet after Discharge:  Regular





Wound Incision Care


Wound/Incision Care:  Other, see below (keep dressing clean and dry)





Follow-Up


Follow up with:  Primary JAYA BENDER MD             Jul 20, 2021 08:41

## 2021-07-20 NOTE — RAD
XR CHEST 1V



History: Port-A-Cath placement



Comparison: 6/23/2021



Technique: Portable AP radiograph of the chest.



Findings:

Right anterior chest wall Mediport with catheter tip terminating at the lower SVC. Left upper extremi
ty PICC tip terminates at the cavoatrial junction. Lungs are adequately and symmetrically inflated. N
o airspace consolidation, pleural effusion or pneumothorax. Cardiomediastinal silhouette and pulmonar
y vasculature are within normal limits. Osseous structures and soft tissues are unremarkable.



Impression: 

1.  Right chest Mediport with tip projecting at the lower SVC. No pneumothorax.



Electronically signed by: Dany Can MD (7/20/2021 2:51 PM) [facility]

## 2021-09-22 ENCOUNTER — HOSPITAL ENCOUNTER (OUTPATIENT)
Dept: HOSPITAL 61 - SURG | Age: 26
Discharge: HOME | End: 2021-09-22
Attending: SURGERY
Payer: OTHER GOVERNMENT

## 2021-09-22 VITALS — BODY MASS INDEX: 16.62 KG/M2 | WEIGHT: 107.14 LBS | HEIGHT: 67.5 IN

## 2021-09-22 VITALS — DIASTOLIC BLOOD PRESSURE: 70 MMHG | SYSTOLIC BLOOD PRESSURE: 109 MMHG

## 2021-09-22 VITALS
SYSTOLIC BLOOD PRESSURE: 105 MMHG | DIASTOLIC BLOOD PRESSURE: 61 MMHG | SYSTOLIC BLOOD PRESSURE: 105 MMHG | DIASTOLIC BLOOD PRESSURE: 61 MMHG

## 2021-09-22 DIAGNOSIS — Z88.8: ICD-10-CM

## 2021-09-22 DIAGNOSIS — Z88.1: ICD-10-CM

## 2021-09-22 DIAGNOSIS — K21.9: ICD-10-CM

## 2021-09-22 DIAGNOSIS — Z20.822: ICD-10-CM

## 2021-09-22 DIAGNOSIS — Z90.49: ICD-10-CM

## 2021-09-22 DIAGNOSIS — Z98.890: ICD-10-CM

## 2021-09-22 DIAGNOSIS — D84.9: ICD-10-CM

## 2021-09-22 DIAGNOSIS — Z45.2: Primary | ICD-10-CM

## 2021-09-22 DIAGNOSIS — Z79.899: ICD-10-CM

## 2021-09-22 DIAGNOSIS — J45.909: ICD-10-CM

## 2021-09-22 PROCEDURE — 36582 REPLACE TUNNELED CV CATH: CPT

## 2021-09-22 PROCEDURE — 88300 SURGICAL PATH GROSS: CPT

## 2021-09-22 PROCEDURE — A4364 ADHESIVE, LIQUID OR EQUAL: HCPCS

## 2021-09-22 PROCEDURE — A6258 TRANSPARENT FILM >16<=48 IN: HCPCS

## 2021-09-22 PROCEDURE — C1788 PORT, INDWELLING, IMP: HCPCS

## 2021-09-22 PROCEDURE — A4452 WATERPROOF TAPE: HCPCS

## 2021-09-22 PROCEDURE — 76000 FLUOROSCOPY <1 HR PHYS/QHP: CPT

## 2021-09-22 PROCEDURE — A4930 STERILE, GLOVES PER PAIR: HCPCS

## 2021-09-22 PROCEDURE — A4657 SYRINGE W/WO NEEDLE: HCPCS

## 2021-09-22 PROCEDURE — A6254 ABSORPT DRG <=16 SQ IN W/BDR: HCPCS

## 2021-09-22 PROCEDURE — 87426 SARSCOV CORONAVIRUS AG IA: CPT

## 2021-09-22 PROCEDURE — 77001 FLUOROGUIDE FOR VEIN DEVICE: CPT

## 2021-09-22 PROCEDURE — 81025 URINE PREGNANCY TEST: CPT

## 2021-09-22 PROCEDURE — 71045 X-RAY EXAM CHEST 1 VIEW: CPT

## 2021-09-22 PROCEDURE — 76937 US GUIDE VASCULAR ACCESS: CPT

## 2021-09-22 RX ADMIN — FENTANYL CITRATE PRN MCG: 50 INJECTION INTRAMUSCULAR; INTRAVENOUS at 16:03

## 2021-09-22 RX ADMIN — FENTANYL CITRATE PRN MCG: 50 INJECTION INTRAMUSCULAR; INTRAVENOUS at 16:09

## 2021-09-22 NOTE — RAD
XR CHEST 1V



Clinical History: Reason: port a cath placement / Spl. Instructions:  / History: 



Technique:  AP view of the chest was obtained at 9/22/2021 4:42 PM.



Comparison: July 20, 2021.



Findings:



The cardiomediastinal silhouette is normal. The pulmonary vasculature is normal. The lungs and pleura
l margins are clear. The right-sided Port-A-Cath has been removed. There is a left jugular Port-A-Cat
h which has its tip directed downward in the low SVC. A portion of the Port-A-Cath above the thoracic
 inlet is not included.



Impression:  



The Port-A-Cath has its tip directed downward in the low SVC. There is no pneumothorax.



Electronically signed by: Zeke Martell III, MD (9/22/2021 5:02 PM) [facility]

## 2021-09-22 NOTE — PDOC4
Operative Note


Operative Note


Operative Note:





Preoperative Diagnosis: Immune deficiency disorder, skin erosion of portacath





Postoperative Diagnosis: Same





Procedure: Removal and replacement of Port-A-Cath using SonoSite guidance





Surgeon: Bebeto





Assistant: Remi LARSON





Anesthesia: Gen.





EBL:  10 mL





Specimen: Port-A-Cath to pathology





Drains: None





Complications: None





Indication: The patient is a 25-year-old female with immune deficiency disorder 

who is dependent on a Port-A-Cath for venous access to provide for necessary 

treatments.  Her existing port has shown signs of skin erosion.  The plan is to 

proceed with removal and replacement of the port.  Given her propensity for 

these erosions we have obtained a slimmer low-profile Port-A-Cath.  The details 

and risks of the procedure were discussed. The risks include bleeding, 

infection, vessel injury, pneumothorax, pain, anesthetic risk, port, catheter or

tubing malfunction or dysfunction, potential need for additional surgery or 

procedure.  The patient understands and would like to proceed.





Description:  The patient was placed supine on the operating table and general 

anesthesia was performed. The bilateral neck and chest were prepped with 

ChloraPrep and draped in a standard surgical manner.  With SonoSite ultrasound 

guidance the left internal jugular vein was readily identified and appeared 

patent. Entry was made into the vein with the skinny introducer needle under 

ultrasound guidance. The skinny guidewire passed readily into the central venous

system. A small incision was made at the skin exit site.  The skinny sheath was 

then placed over the guidewire. The larger guidewire was then placed within the 

sheath into the central venous system. Intraoperative fluoroscopy confirmed good

position of the guidewire in the central venous system. The dilator and sheath 

were then placed over the guidewire. The catheter portion was then inserted into

the central venous system and visualized using fluoroscopy. A separate left 

upper chest skin incision was made with a scalpel. A subcutaneous pocket was 

developed with cautery of sufficient size to accommodate the port. The catheter 

was then tunneled subcutaneously to the level of the newly formed pocket. Using 

fluoroscopy the catheter was positioned with the tip in the distal superior vena

cava.  The catheter was then cut and assembled to the slim port. The port was 

then secured to the chest wall with two 2-0 Prolene sutures.  The catheter was 

of a smaller diameter than her prior port and seemed to more readily kink in the

subcutaneous tissues, particularly in the neck.   We were able to adjust the 

catheter in a curved path allowing it to stay open.  Using the Larkin needle the 

port readily aspirated and flushed without difficulty. Fluoroscopy confirmed 

good positioning of the catheter with no twists or kinks.  The subcutaneous 

tissue was approximated with 3-0 Vicryl. The skin was then closed with 4-0 

Monocryl. A sterile OpSite dressing was then applied. We then directed our 

attention to the right chest.  An incision was made at the prior port scar.  The

capsule of the port was opened.  The attaching sutures were cut and the port and

catheter were readily withdrawn without difficulty.  The catheter tract was 

oversewn with 3-0 Vicryl.  Hemostasis was achieved with cautery.  The incision 

was closed with 4-0 Monocryl and the erosion site was left open and dressed.  

The patient tolerated the procedure well and was sent to the recovery room in 

stable condition. At the end of the case all counts were correct.











JAYA JAIME MD             Sep 22, 2021 16:57

## 2021-09-24 NOTE — PATHOLOGY
[facility] Accession Number: [id number]

.                                                                01

Material submitted:                                        .

body - PORT-A-CATHETER

.                                                                01

Clinical history:                                          .

IMMUNE DISORDER

SKIN EROSION OVERLYING PORT

REMOVAL AND REPLACEMENT PORT-A-CATH

.                                                                02

**********************************************************************

Diagnosis:

Port-A-Cath (Gross only).

.

(JPM:shoaib; 09/24/2021)

S  09/24/2021  1701 Local

**********************************************************************

.                                                                02

Electronically signed:                                     .

Nelson Cha MD, Pathologist

NPI- [id number]

.                                                                01

Gross description:                                         .

The specimen is received fresh, labeled "Amanda Vazquez, Port-A-Catheter".

Received is a purple metal triangular port measuring 2.7 x 2.4 x 1.3 cm

with attached white tubing measuring 18.3 cm in length by 0.3 cm in

diameter.  The back of the port is inscribed with "BARD", "CT", and

"[id number]".  Gross photographs are taken.  Sections are not submitted.

([facility]; 9/24/2021)

QA/[facility]  09/24/2021  0911 Local

.                                                                02

Pathologist provided ICD-10:

D89.9

.                                                                02

CPT                                                        .

982015

Specimen Comment: A courtesy copy of this report has been sent to [phone], [phone]

Specimen Comment: [id number]

Specimen Comment: Report sent to  / DR PLUNKETT

***Performed at:  01

   [facility]

   [address], [place]  [id number]

   MD Samuel Evangelista MD Phone:  [phone]

***Performed at:  02

   [facility]

   [address], [place]  [id number]

   MD Nelson Cha MD Phone:  [phone]

## 2021-11-09 ENCOUNTER — HOSPITAL ENCOUNTER (OUTPATIENT)
Dept: HOSPITAL 61 - RAD | Age: 26
End: 2021-11-09
Attending: SURGERY
Payer: OTHER GOVERNMENT

## 2021-11-09 DIAGNOSIS — D84.9: ICD-10-CM

## 2021-11-09 DIAGNOSIS — Z95.828: Primary | ICD-10-CM

## 2021-11-09 PROCEDURE — 71046 X-RAY EXAM CHEST 2 VIEWS: CPT

## 2021-11-09 NOTE — RAD
AP and Lateral Views of the Chest  11/9/2021 4:13 PM



Indication: Reason: IMMUNE DISORDER DEFICIENCY. PORT-A-CATH INPLACE.  



Comparison: Chest radiograph September 22, 2021



Findings: There is a left internal jugular port with tip projecting over the expected region of the c
avoatrial junction. No pneumothorax is seen. No pleural effusion or focal infiltrate is seen. Heart s
ize is normal. No acute osseous changes are identified.



IMPRESSION: 

1.No evidence of acute cardiopulmonary process 



2.Left internal jugular port with tip projecting over the expected region of the cavoatrial junction



Electronically signed by: Keith Benjamin MD (11/9/2021 4:34 PM) [id number]

## 2021-11-12 ENCOUNTER — HOSPITAL ENCOUNTER (OUTPATIENT)
Dept: HOSPITAL 61 - INTRAD | Age: 26
Discharge: HOME | End: 2021-11-12
Attending: SURGERY
Payer: OTHER GOVERNMENT

## 2021-11-12 VITALS
DIASTOLIC BLOOD PRESSURE: 71 MMHG | SYSTOLIC BLOOD PRESSURE: 110 MMHG | DIASTOLIC BLOOD PRESSURE: 71 MMHG | SYSTOLIC BLOOD PRESSURE: 110 MMHG

## 2021-11-12 VITALS — WEIGHT: 111.11 LBS | BODY MASS INDEX: 17.65 KG/M2 | HEIGHT: 66.5 IN

## 2021-11-12 DIAGNOSIS — Z98.890: ICD-10-CM

## 2021-11-12 DIAGNOSIS — Z90.49: ICD-10-CM

## 2021-11-12 DIAGNOSIS — Z88.1: ICD-10-CM

## 2021-11-12 DIAGNOSIS — Z87.440: ICD-10-CM

## 2021-11-12 DIAGNOSIS — K21.9: ICD-10-CM

## 2021-11-12 DIAGNOSIS — Z88.8: ICD-10-CM

## 2021-11-12 DIAGNOSIS — D84.9: ICD-10-CM

## 2021-11-12 DIAGNOSIS — Z95.828: ICD-10-CM

## 2021-11-12 DIAGNOSIS — Z45.2: Primary | ICD-10-CM

## 2021-11-12 DIAGNOSIS — J45.909: ICD-10-CM

## 2021-11-12 DIAGNOSIS — Z79.899: ICD-10-CM

## 2021-11-12 PROCEDURE — 36598 INJ W/FLUOR EVAL CV DEVICE: CPT

## 2021-11-12 NOTE — NUR
Dr. Benjamin spoke with pt's mother regarding the port. no problem accessing it , flushed 
freely and the catheter portion was in the correct place. pt was transferred back to her 
personal w/c and left she the unit with her mother.

## 2021-11-16 NOTE — RAD
Fluoroscopic evaluation of left internal jugular port November 12, 2021



Total fluoroscopy time: 1.1 minutes

Dose area product 3 Gray centimeter squared



Discussion: Fluoroscopic evaluation of the patient's left internal jugular port was performed. The ca
theter is intact under fluoroscopy. Catheter tip is in expected position. Mild looping of the cathete
r in the left neck noted. Contrast was administered through the catheter demonstrating no extravasati
on or leak. Catheter was flushed and aspirated in a normal fashion. Catheter was heparinized.



IMPRESSION: Left internal jugular port aspirates and flushes normally, with catheter tip in expected 
position. No fracture or extravasation is seen.



Electronically signed by: Keith Benjamin MD (11/16/2021 11:52 AM) [id number]

## 2021-12-23 VITALS — SYSTOLIC BLOOD PRESSURE: 102 MMHG | DIASTOLIC BLOOD PRESSURE: 59 MMHG

## 2022-01-03 ENCOUNTER — HOSPITAL ENCOUNTER (OUTPATIENT)
Dept: HOSPITAL 63 - CT | Age: 27
End: 2022-01-03
Attending: OTOLARYNGOLOGY
Payer: OTHER GOVERNMENT

## 2022-01-03 DIAGNOSIS — J34.1: Primary | ICD-10-CM

## 2022-01-03 DIAGNOSIS — J32.9: ICD-10-CM

## 2022-01-03 DIAGNOSIS — J34.3: ICD-10-CM

## 2022-01-03 PROCEDURE — 70486 CT MAXILLOFACIAL W/O DYE: CPT

## 2022-01-03 NOTE — RAD
EXAMINATION: CT sinuses without IV contrast



INDICATION:26 years, Female, chronic sinusitis.



COMPARISON: None



TECHNIQUE: Spiral acquisition of images from the maxilla through the frontal sinuses. Sagittal and co
dionna 2D reformatted series were provided by the technologist. The CT scan was acquired using radiati
on reduction techniques, such automated exposure control, adjustment of the mA and/or kV according to
 the patient's size and use of iterative reconstruction techniques.



FINDINGS:

MAXILLARY SINUSES: Thin incomplete septation in the right maxillary sinus. Small retention cyst in th
e right maxillary antrum. Left maxillary sinus is clear.

MAXILLARY SINUS OSTIA: patent

UNCINATE PROCESSES AND INFUNDIBULA: within normal limits

TURBINATES: Mildly hypertrophic left inferior turbinate.



FRONTAL RECESSES: are clear

FRONTAL SINUSES: are clear



BILATERAL, ANTERIOR, AND POSTERIOR ETHMOID AIR CELLS (including ethmoid bullae, ethmoid roofs, and ba
sal lamellae): normal

SPHENOETHMOID RECESSES: clear

SPHENOID SINUSES: widely patent



CRIBRIFORM PLATE, LATERAL LAMELLA, AND FOVEA ETHMOIDALIS: normal step-like morphology

NASAL SEPTUM: normal in appearance.

VISUALIZED MASTOID AIR CELLS: clear



VISUALIZED DENTITION: normal in appearance



IMPRESSION:

No evidence of acute or chronic paranasal sinusitis.



Electronically signed by: Inderjit Butler MD (1/3/2022 4:42 PM) [facility]

## 2022-03-22 ENCOUNTER — HOSPITAL ENCOUNTER (OUTPATIENT)
Dept: HOSPITAL 61 - US | Age: 27
End: 2022-03-22
Payer: OTHER GOVERNMENT

## 2022-03-22 DIAGNOSIS — R22.2: Primary | ICD-10-CM

## 2022-03-22 DIAGNOSIS — Z93.4: ICD-10-CM

## 2022-03-22 PROCEDURE — 76881 US COMPL JOINT R-T W/IMG: CPT

## 2022-03-22 NOTE — RAD
INDICATION:  Reason: PALPABLE LUMP NEXT TO J TUBE / Spl. Instructions:  / History: 



COMPARISON: CT from June 2019



FINDINGS:

Focused ultrasound images are obtained of the soft tissues adjacent to the patient's jejunostomy tube
.



Adjacent to the jejunostomy tube there is a region of hypoechoic complex material identified with xiang
e shadowing in the area. This measures up to about 16 mm in thickness with some of this hypoechoic ma
terial also extending into the adjacent subcutaneous fat with the overall extent of greater than abou
t 3 cm.



IMPRESSION:



*   Subcutaneous soft tissues adjacent to the jejunostomy tube. This has a nonspecific ultrasound peetr
earance with complex fluid and confluent edema from infectious etiology within the differential with 
another possible cause including blood within the soft tissues adjacent to the jejunostomy tube site.
 Leakage from the jejunostomy tube is also not excluded. Other Causes such as a subcutaneous mass wou
ld be unlikely in a patient of this age.



Electronically signed by: Sameer Way MD (3/22/2022 5:12 PM) [id number]

## 2022-04-21 ENCOUNTER — HOSPITAL ENCOUNTER (EMERGENCY)
Dept: HOSPITAL 63 - ER | Age: 27
Discharge: HOME | End: 2022-04-21
Payer: OTHER GOVERNMENT

## 2022-04-21 VITALS
SYSTOLIC BLOOD PRESSURE: 112 MMHG | DIASTOLIC BLOOD PRESSURE: 61 MMHG | DIASTOLIC BLOOD PRESSURE: 61 MMHG | SYSTOLIC BLOOD PRESSURE: 112 MMHG | SYSTOLIC BLOOD PRESSURE: 112 MMHG | DIASTOLIC BLOOD PRESSURE: 61 MMHG

## 2022-04-21 VITALS — HEIGHT: 67.5 IN | WEIGHT: 119.93 LBS | BODY MASS INDEX: 18.6 KG/M2

## 2022-04-21 DIAGNOSIS — M25.551: Primary | ICD-10-CM

## 2022-04-21 DIAGNOSIS — Z88.8: ICD-10-CM

## 2022-04-21 DIAGNOSIS — J45.909: ICD-10-CM

## 2022-04-21 DIAGNOSIS — Z88.1: ICD-10-CM

## 2022-04-21 DIAGNOSIS — K21.9: ICD-10-CM

## 2022-04-21 PROCEDURE — 99284 EMERGENCY DEPT VISIT MOD MDM: CPT

## 2022-04-21 PROCEDURE — 73502 X-RAY EXAM HIP UNI 2-3 VIEWS: CPT

## 2022-04-21 PROCEDURE — 73700 CT LOWER EXTREMITY W/O DYE: CPT

## 2022-04-21 NOTE — RAD
CT right hip without contrast dated 4/21/2022.



COMPARISON: Right hip pain. Rule out avascular necrosis.



TECHNIQUE:



Contiguous axial imaging the right hip was performed with thin cut coronal and sagittal reconstructio
n. 

One or more of the following individualized dose reduction techniques were utilized for this examinat
ion:  

1. Automated exposure control  

2. Adjustment of the mA and/or kV according to patient size  

3. Use of iterative reconstruction technique



FINDINGS: 



Bony alignment is anatomic. No displaced fracture. No periostitis or bone destruction. No significant
 subchondral sclerotic or cystic changes or evidence of AVN. No apparent joint effusion or loose body
.



Visualized soft tissue structures are unremarkable. No significant free pelvic fluid. No pelvic adeno
bridgett.



IMPRESSION:



No acute bony or soft tissue abnormality.



Electronically signed by: Robbi Gleason MD (4/21/2022 2:53 PM) [email or web address]

## 2022-04-21 NOTE — RAD
EXAM:  XR RIGHT HIP (WITH OR WITHOUT PELVIS) 2 VIEWS 4/21/2022 1:40 PM



CLINICAL INDICATION:  Pain, wheelchair bound



COMPARISON:  None



TECHNIQUE:  AP view of the pelvis. AP and frog-leg lateral view of the right hip



FINDINGS:  There is no acute fracture. Alignment is normal. The hip joint spaces are maintained. The 
sacroiliac joints and pubic symphysis are normal. The sacrum is partially obscured by superimposed danelle
wel. There is a stimulator device projecting over the right iliac wing and sacrum.



IMPRESSION:  No acute osseous abnormality.



Electronically signed by: Lacehlle Mathews MD (4/21/2022 3:00 PM) [id number]

## 2022-04-21 NOTE — PHYS DOC
Past History


Past Medical History:  Asthma, GERD, Other


Additional Past Medical Histor:  TBI, history of autoimmune d/o, NEUROGENIC 

BLADDER


Past Surgical History:  Cholecystectomy, Tonsillectomy


Additional Past Surgical Histo:  PEG tube, fundoplicationx3,neurostimulator,port

J-TUBE


Smoking:  Non-smoker


Alcohol Use:  None


Drug Use:  None





General Adult


EDM:


Chief Complaint:  HIP PAIN





HPI:


HPI:


26-year-old female who is wheelchair-bound presents with right hip pain.  The 

patient was lying on the CT table a few days ago for about an hour and a half.  

She had her legs in awkward position.  Since that time, she has had right hip 

discomfort.  He seems to be getting worse.  She states that it feels like it is 

not stable.  She normally is able to bear weight and pivot from her wheelchair 

to another chair but she is unable to do that at this time due to discomfort.  

The pain is in the posterior aspect and is a sharp cramping sensation mostly 

with movement.  Loading the joint or weightbearing is the most painful.  She 

denies falls or trauma.





Review of Systems:


Review of Systems:


Constitutional:  Denies fever or chills 


Eyes:  Denies change in visual acuity 


HENT:  Denies nasal congestion or sore throat 


Respiratory:  Denies cough or shortness of breath 


Cardiovascular:  Denies chest pain or edema 


GI:  Denies abdominal pain, nausea, vomiting, bloody stools or diarrhea 


: Denies dysuria 


Musculoskeletal: Right hip pain


Integument:  Denies rash 


Neurologic:  Denies headache, focal weakness or sensory changes 


Endocrine:  Denies polyuria or polydipsia 


Lymphatic:  Denies swollen glands 


Psychiatric:  Denies depression or anxiety





Allergies:


Allergies:





Allergies








Coded Allergies Type Severity Reaction Last Updated Verified


 


  erythromycin base Allergy Intermediate  4/21/22 Yes


 


  I S O L A T I O N *CONTACT* Allergy Unknown  5/31/21 Yes











Physical Exam:


PE:





Constitutional: Well developed, well nourished, no acute distress, non-toxic 

appearance. []


HENT: Normocephalic, atraumatic, bilateral external ears normal, oropharynx 

moist, no oral exudates, nose normal. []


Eyes: PERRLA, EOMI, conjunctiva normal, no discharge. [] 


Neck: Normal range of motion, no tenderness, supple, no stridor. [] 


Cardiovascular: Heart rate regular rhythm, no murmur []


Lungs & Thorax:  Bilateral breath sounds clear to auscultation []


Abdomen: Left sided open wound of the abdomen without obvious current drainage. 

 [] 


Skin: Warm, dry, no erythema, no rash. [] 


Back: No tenderness, no CVA tenderness. [] 


Extremities: Tenderness over the middle toe lateral buttocks about one third 

superior from the inferior buttocks border [] 


Neurologic: Alert and oriented X 3, normal motor function, normal sensory 

function, no focal deficits noted. []


Psychologic: Affect normal, judgement normal, mood normal. []





Current Patient Data:


Vital Signs:





                                   Vital Signs








  Date Time  Temp Pulse Resp B/P (MAP) Pulse Ox O2 Delivery O2 Flow Rate FiO2


 


4/21/22 13:26 99.1 96 18 118/51 (73) 99   











EKG:


EKG:


[]





Radiology/Procedures:


Radiology/Procedures:


[]


Impressions:


CT right hip without contrast dated 4/21/2022.





COMPARISON: Right hip pain. Rule out avascular necrosis.





TECHNIQUE:





Contiguous axial imaging the right hip was performed with thin cut coronal and 

sagittal reconstruction. 


One or more of the following individualized dose reduction techniques were 

utilized for this examination:  


1. Automated exposure control  


2. Adjustment of the mA and/or kV according to patient size  


3. Use of iterative reconstruction technique





FINDINGS: 





Bony alignment is anatomic. No displaced fracture. No periostitis or bone 

destruction. No significant subchondral sclerotic or cystic changes or evidence 

of AVN. No apparent joint effusion or loose body.





Visualized soft tissue structures are unremarkable. No significant free pelvic 

fluid. No pelvic adenopathy.





IMPRESSION:





No acute bony or soft tissue abnormality.





Electronically signed by: Rich Gleason MD (4/21/2022 2:53 PM) [facility]














DICTATED AND SIGNED BY:     RICH GLEASON MD


DATE:     04/21/22 1449





CC: KATHY HANLEY DO; SANDIE PLUNKETT DO, MPH ~





Heart Score:


C/O Chest Pain:  N/A


Risk Factors:


Risk Factors:  DM, Current or recent (<one month) smoker, HTN, HLP, family 

history of CAD, obesity.


Risk Scores:


Score 0 - 3:  2.5% MACE over next 6 weeks - Discharge Home


Score 4 - 6:  20.3% MACE over next 6 weeks - Admit for Clinical Observation


Score 7 - 10:  72.7% MACE over next 6 weeks - Early Invasive Strategies





Course & Med Decision Making:


Course & Med Decision Making


Pertinent Labs and Imaging studies reviewed. (See chart for details)


The patient's hip x-ray is negative for acute findings.  When CT of the joint to

 rule out avascular necrosis as well as higher quality imaging of the area.  

There are no acute findings.  See official read for more details.  That will 

believe this should disrupt the patient's scheduled surgery tomorrow.  I have 

advised that they see how things go over the next few days.  If she continues to

 have significant pain in this joint they can follow-up with orthopedics and 

consider MRI.  Possibilities are gluteus muscle tear, muscle strain.  The 

patient is stable for discharge at this time.


[]





Dragon Disclaimer:


Dragon Disclaimer:


This electronic medical record was generated, in whole or in part, using a voice

 recognition dictation system.





Departure


Departure:


Impression:  


   Primary Impression:  


   Right hip pain


Disposition:  01 HOME / SELF CARE / HOMELESS


Condition:  STABLE


Referrals:  


SANDIE PLUNKETT DO, MPH (PCP)


Patient Instructions:  Hip Pain











KATHY HANLEY DO                 Apr 21, 2022 13:37

## 2022-04-22 ENCOUNTER — HOSPITAL ENCOUNTER (OUTPATIENT)
Dept: HOSPITAL 61 - INTRAD | Age: 27
Discharge: HOME | End: 2022-04-22
Payer: OTHER GOVERNMENT

## 2022-04-22 VITALS — DIASTOLIC BLOOD PRESSURE: 67 MMHG | SYSTOLIC BLOOD PRESSURE: 109 MMHG

## 2022-04-22 VITALS — HEIGHT: 66 IN | BODY MASS INDEX: 17.72 KG/M2 | WEIGHT: 110.23 LBS

## 2022-04-22 VITALS — SYSTOLIC BLOOD PRESSURE: 109 MMHG | DIASTOLIC BLOOD PRESSURE: 66 MMHG

## 2022-04-22 VITALS — DIASTOLIC BLOOD PRESSURE: 69 MMHG | SYSTOLIC BLOOD PRESSURE: 107 MMHG

## 2022-04-22 VITALS — DIASTOLIC BLOOD PRESSURE: 68 MMHG | SYSTOLIC BLOOD PRESSURE: 119 MMHG

## 2022-04-22 VITALS — SYSTOLIC BLOOD PRESSURE: 119 MMHG | DIASTOLIC BLOOD PRESSURE: 67 MMHG

## 2022-04-22 DIAGNOSIS — Z98.890: ICD-10-CM

## 2022-04-22 DIAGNOSIS — Z79.899: ICD-10-CM

## 2022-04-22 DIAGNOSIS — Z88.8: ICD-10-CM

## 2022-04-22 DIAGNOSIS — Z88.1: ICD-10-CM

## 2022-04-22 DIAGNOSIS — K21.9: ICD-10-CM

## 2022-04-22 DIAGNOSIS — J45.909: ICD-10-CM

## 2022-04-22 DIAGNOSIS — L02.211: Primary | ICD-10-CM

## 2022-04-22 LAB
BASOPHILS # BLD AUTO: 0 X10^3/UL (ref 0–0.2)
BASOPHILS NFR BLD: 0 % (ref 0–3)
EOSINOPHIL NFR BLD: 0 % (ref 0–3)
EOSINOPHIL NFR BLD: 0 X10^3/UL (ref 0–0.7)
ERYTHROCYTE [DISTWIDTH] IN BLOOD BY AUTOMATED COUNT: 12 % (ref 11.5–14.5)
HCT VFR BLD CALC: 34.7 % (ref 36–47)
HGB BLD-MCNC: 11.9 G/DL (ref 12–15.5)
LYMPHOCYTES # BLD: 0.9 X10^3/UL (ref 1–4.8)
LYMPHOCYTES NFR BLD AUTO: 10 % (ref 24–48)
MCH RBC QN AUTO: 30 PG (ref 25–35)
MCHC RBC AUTO-ENTMCNC: 34 G/DL (ref 31–37)
MCV RBC AUTO: 89 FL (ref 79–100)
MONO #: 0.9 X10^3/UL (ref 0–1.1)
MONOCYTES NFR BLD: 11 % (ref 0–9)
NEUT #: 6.9 X10^3/UL (ref 1.8–7.7)
NEUTROPHILS NFR BLD AUTO: 79 % (ref 31–73)
PLATELET # BLD AUTO: 155 X10^3/UL (ref 140–400)
PROTHROMBIN TIME: 14.3 SEC (ref 11.7–14)
RBC # BLD AUTO: 3.91 X10^6/UL (ref 3.5–5.4)
WBC # BLD AUTO: 8.8 X10^3/UL (ref 4–11)

## 2022-04-22 PROCEDURE — 36415 COLL VENOUS BLD VENIPUNCTURE: CPT

## 2022-04-22 PROCEDURE — 87077 CULTURE AEROBIC IDENTIFY: CPT

## 2022-04-22 PROCEDURE — 10005 FNA BX W/US GDN 1ST LES: CPT

## 2022-04-22 PROCEDURE — 87186 SC STD MICRODIL/AGAR DIL: CPT

## 2022-04-22 PROCEDURE — 85610 PROTHROMBIN TIME: CPT

## 2022-04-22 PROCEDURE — 85025 COMPLETE CBC W/AUTO DIFF WBC: CPT

## 2022-04-22 PROCEDURE — 87040 BLOOD CULTURE FOR BACTERIA: CPT

## 2022-04-22 PROCEDURE — 87075 CULTR BACTERIA EXCEPT BLOOD: CPT

## 2022-04-22 PROCEDURE — 49083 ABD PARACENTESIS W/IMAGING: CPT

## 2022-04-22 NOTE — RAD
Procedure: Ultrasound-guided aspiration of abdominal wall abscess



Clinical Indication: Adult female with complex appearing septated fluid collection adjacent to the PE
G tube site



Sedation: Local anesthesia only



Consent: The procedure was explained in its entirety to the patient or the patients designated repres
entative by a member of the treatment team, including a discussion of the risks, benefits and commonl
y accepted alternatives to the procedure, as well as the expected consequences of not performing the 
procedure.  Discussion of the risks included, but was not limited to, those that are most frequent an
d those that are rare but possibly severe or life-threatening, as well as the possibility of unforese
en complications.



Technique and Findings: Following informed consent, the patient was prepped and draped in usual steri
le fashion. Ultrasound interrogation of the area of interest revealed a multiloculated low volume com
plex fluid collection in the subcutaneous tissues. 1 percent lidocaine was used to achieve local anes
thesia. A small dermatotomy was made. Under ultrasound guidance, an 18-gauge Seldinger needle was adv
anced into the fluid collection and aspiration was performed yielding 2 cc of adair pus, resulting in
 near complete involution of the majority of the abnormality. Multiple attempts to obtain additional 
fluid were unsuccessful due to the small size of residual pockets. The needle was removed and hemosta
sis was achieved with manual compression. Specimen was sent for microbiological analysis.



Complications: No immediate



Impression:

1. Ultrasound-guided aspiration of a complex abdominal wall abscess yielding 2 cc of adair pus result
ing in near complete involution of the abnormality.







PQRS Compliance Statement:



One or more of the following individualized dose reduction techniques were utilized for this examinat
ion:  

1. Automated exposure control  

2. Adjustment of the mA and/or kV according to patient size  

3. Use of iterative reconstruction technique



Electronically signed by: Chet Montgomery MD (4/22/2022 12:12 PM) [id number]

## 2022-04-28 ENCOUNTER — HOSPITAL ENCOUNTER (OUTPATIENT)
Dept: HOSPITAL 63 - LAB | Age: 27
Discharge: HOME | End: 2022-04-28
Attending: INTERNAL MEDICINE
Payer: OTHER GOVERNMENT

## 2022-04-28 DIAGNOSIS — L02.211: Primary | ICD-10-CM

## 2022-04-28 DIAGNOSIS — J45.909: ICD-10-CM

## 2022-04-28 DIAGNOSIS — D83.9: ICD-10-CM

## 2022-04-28 DIAGNOSIS — K21.9: ICD-10-CM

## 2022-04-28 LAB
% BANDS: 4 % (ref 0–9)
% LYMPHS: 34 % (ref 24–48)
% MONOS: 5 % (ref 0–10)
% MYELOS: 2 % (ref 0–0)
% SEGS: 54 % (ref 35–66)
ALBUMIN SERPL-MCNC: 2.9 G/DL (ref 3.4–5)
ALBUMIN/GLOB SERPL: 0.8 {RATIO} (ref 1–1.7)
ALP SERPL-CCNC: 57 U/L (ref 46–116)
ALT SERPL-CCNC: 56 U/L (ref 14–59)
ANION GAP SERPL CALC-SCNC: 2 MMOL/L (ref 6–14)
AST SERPL-CCNC: 46 U/L (ref 15–37)
BASOPHILS # BLD AUTO: 0 X10^3/UL (ref 0–0.2)
BASOPHILS NFR BLD: 1 % (ref 0–3)
BILIRUB SERPL-MCNC: 0.4 MG/DL (ref 0.2–1)
BUN/CREAT SERPL: 14 (ref 6–20)
CA-I SERPL ISE-MCNC: 7 MG/DL (ref 7–20)
CALCIUM SERPL-MCNC: 8.7 MG/DL (ref 8.5–10.1)
CHLORIDE SERPL-SCNC: 103 MMOL/L (ref 98–107)
CO2 SERPL-SCNC: 33 MMOL/L (ref 21–32)
CREAT SERPL-MCNC: 0.5 MG/DL (ref 0.6–1)
EOSINOPHIL NFR BLD AUTO: 1 % (ref 0–5)
EOSINOPHIL NFR BLD: 0.1 X10^3/UL (ref 0–0.7)
EOSINOPHIL NFR BLD: 1 % (ref 0–3)
ERYTHROCYTE [DISTWIDTH] IN BLOOD BY AUTOMATED COUNT: 12.1 % (ref 11.5–14.5)
GFR SERPLBLD BASED ON 1.73 SQ M-ARVRAT: 149.1 ML/MIN
GLOBULIN SER-MCNC: 3.7 G/DL (ref 2.2–3.8)
GLUCOSE SERPL-MCNC: 95 MG/DL (ref 70–99)
HCT VFR BLD CALC: 36.2 % (ref 36–47)
HGB BLD-MCNC: 12.1 G/DL (ref 12–15.5)
LYMPHOCYTES # BLD: 2 X10^3/UL (ref 1–4.8)
LYMPHOCYTES NFR BLD AUTO: 27 % (ref 24–48)
MCH RBC QN AUTO: 30 PG (ref 25–35)
MCHC RBC AUTO-ENTMCNC: 33 G/DL (ref 31–37)
MCV RBC AUTO: 90 FL (ref 79–100)
MONO #: 0.4 X10^3/UL (ref 0–1.1)
MONOCYTES NFR BLD: 6 % (ref 0–9)
NEUT #: 4.8 X10^3UL (ref 1.8–7.7)
NEUTROPHILS NFR BLD AUTO: 66 % (ref 31–73)
PLATELET # BLD AUTO: 292 X10^3/UL (ref 140–400)
PLATELET # BLD EST: ADEQUATE 10*3/UL
POTASSIUM SERPL-SCNC: 3.2 MMOL/L (ref 3.5–5.1)
PROT SERPL-MCNC: 6.6 G/DL (ref 6.4–8.2)
RBC # BLD AUTO: 4.01 X10^6/UL (ref 3.5–5.4)
SODIUM SERPL-SCNC: 138 MMOL/L (ref 136–145)
WBC # BLD AUTO: 7.3 X10^3/UL (ref 4–11)

## 2022-04-28 PROCEDURE — 85007 BL SMEAR W/DIFF WBC COUNT: CPT

## 2022-04-28 PROCEDURE — 85025 COMPLETE CBC W/AUTO DIFF WBC: CPT

## 2022-04-28 PROCEDURE — 36415 COLL VENOUS BLD VENIPUNCTURE: CPT

## 2022-04-28 PROCEDURE — 80053 COMPREHEN METABOLIC PANEL: CPT

## 2022-05-02 ENCOUNTER — HOSPITAL ENCOUNTER (OUTPATIENT)
Dept: HOSPITAL 63 - LAB | Age: 27
End: 2022-05-02
Payer: OTHER GOVERNMENT

## 2022-05-02 DIAGNOSIS — D83.9: Primary | ICD-10-CM

## 2022-05-02 LAB
ALBUMIN SERPL-MCNC: 3.4 G/DL (ref 3.4–5)
ALBUMIN/GLOB SERPL: 0.7 {RATIO} (ref 1–1.7)
ALP SERPL-CCNC: 71 U/L (ref 46–116)
ALT SERPL-CCNC: 52 U/L (ref 14–59)
ANION GAP SERPL CALC-SCNC: 7 MMOL/L (ref 6–14)
AST SERPL-CCNC: 28 U/L (ref 15–37)
BASOPHILS # BLD AUTO: 0.1 X10^3/UL (ref 0–0.2)
BASOPHILS NFR BLD: 1 % (ref 0–3)
BILIRUB SERPL-MCNC: 0.3 MG/DL (ref 0.2–1)
BUN/CREAT SERPL: 20 (ref 6–20)
CA-I SERPL ISE-MCNC: 12 MG/DL (ref 7–20)
CALCIUM SERPL-MCNC: 9.5 MG/DL (ref 8.5–10.1)
CHLORIDE SERPL-SCNC: 100 MMOL/L (ref 98–107)
CO2 SERPL-SCNC: 27 MMOL/L (ref 21–32)
CREAT SERPL-MCNC: 0.6 MG/DL (ref 0.6–1)
EOSINOPHIL NFR BLD: 0.1 X10^3/UL (ref 0–0.7)
EOSINOPHIL NFR BLD: 2 % (ref 0–3)
ERYTHROCYTE [DISTWIDTH] IN BLOOD BY AUTOMATED COUNT: 12.8 % (ref 11.5–14.5)
GFR SERPLBLD BASED ON 1.73 SQ M-ARVRAT: 120.8 ML/MIN
GLOBULIN SER-MCNC: 5.1 G/DL (ref 2.2–3.8)
GLUCOSE SERPL-MCNC: 82 MG/DL (ref 70–99)
HCT VFR BLD CALC: 42.1 % (ref 36–47)
HGB BLD-MCNC: 14.1 G/DL (ref 12–15.5)
LYMPHOCYTES # BLD: 2.4 X10^3/UL (ref 1–4.8)
LYMPHOCYTES NFR BLD AUTO: 27 % (ref 24–48)
MCH RBC QN AUTO: 31 PG (ref 25–35)
MCHC RBC AUTO-ENTMCNC: 34 G/DL (ref 31–37)
MCV RBC AUTO: 91 FL (ref 79–100)
MONO #: 0.6 X10^3/UL (ref 0–1.1)
MONOCYTES NFR BLD: 7 % (ref 0–9)
NEUT #: 5.6 X10^3UL (ref 1.8–7.7)
NEUTROPHILS NFR BLD AUTO: 64 % (ref 31–73)
PLATELET # BLD AUTO: 375 X10^3/UL (ref 140–400)
POTASSIUM SERPL-SCNC: 4.1 MMOL/L (ref 3.5–5.1)
PROT SERPL-MCNC: 8.5 G/DL (ref 6.4–8.2)
RBC # BLD AUTO: 4.6 X10^6/UL (ref 3.5–5.4)
SODIUM SERPL-SCNC: 134 MMOL/L (ref 136–145)
WBC # BLD AUTO: 8.7 X10^3/UL (ref 4–11)

## 2022-05-02 PROCEDURE — 80053 COMPREHEN METABOLIC PANEL: CPT

## 2022-05-02 PROCEDURE — 36415 COLL VENOUS BLD VENIPUNCTURE: CPT

## 2022-05-02 PROCEDURE — 85025 COMPLETE CBC W/AUTO DIFF WBC: CPT
